# Patient Record
Sex: FEMALE | Race: WHITE | NOT HISPANIC OR LATINO | Employment: OTHER | URBAN - METROPOLITAN AREA
[De-identification: names, ages, dates, MRNs, and addresses within clinical notes are randomized per-mention and may not be internally consistent; named-entity substitution may affect disease eponyms.]

---

## 2021-06-24 ENCOUNTER — OFFICE VISIT (OUTPATIENT)
Dept: URGENT CARE | Age: 76
End: 2021-06-24
Payer: COMMERCIAL

## 2021-06-24 ENCOUNTER — APPOINTMENT (OUTPATIENT)
Dept: RADIOLOGY | Age: 76
End: 2021-06-24
Payer: COMMERCIAL

## 2021-06-24 VITALS
WEIGHT: 259 LBS | HEIGHT: 59 IN | DIASTOLIC BLOOD PRESSURE: 74 MMHG | RESPIRATION RATE: 20 BRPM | BODY MASS INDEX: 52.21 KG/M2 | HEART RATE: 80 BPM | TEMPERATURE: 98 F | SYSTOLIC BLOOD PRESSURE: 163 MMHG | OXYGEN SATURATION: 100 %

## 2021-06-24 DIAGNOSIS — M19.019 ARTHRITIS OF SHOULDER: ICD-10-CM

## 2021-06-24 DIAGNOSIS — M19.031 ARTHRITIS OF RIGHT WRIST: ICD-10-CM

## 2021-06-24 DIAGNOSIS — S80.02XA CONTUSION OF LEFT KNEE, INITIAL ENCOUNTER: Primary | ICD-10-CM
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
## 2021-06-24 DIAGNOSIS — M19.032 ARTHRITIS OF LEFT WRIST: ICD-10-CM

## 2021-06-24 PROCEDURE — 73110 X-RAY EXAM OF WRIST: CPT

## 2021-06-24 PROCEDURE — 73564 X-RAY EXAM KNEE 4 OR MORE: CPT

## 2021-06-24 PROCEDURE — 99213 OFFICE O/P EST LOW 20 MIN: CPT | Performed by: PHYSICIAN ASSISTANT

## 2021-06-24 PROCEDURE — 73030 X-RAY EXAM OF SHOULDER: CPT

## 2021-06-24 NOTE — PROGRESS NOTES
[facility]        NAME: Olive Pabon is a 77 y o  female  : 1945    MRN: [id number]  DATE: 2021  TIME: 12:00 PM    Assessment and Plan   Contusion of left knee, initial encounter [S80 02XA]  1  Contusion of left knee, initial encounter  XR knee 4+ vw left injury   2  Arthritis of right wrist  XR wrist 3+ vw right   3  Arthritis of left wrist  XR wrist 3+ vw left   4  Arthritis of shoulder  XR shoulder 2+ vw right     XR provider read  No acute findings identified  Physical exam largely unremarkable  Pt has significant arthritis and she states her pain feels like an exacerbation of these sx  Educated pt on signs and sx to follow up or go to ER  Pt states she understands and agrees to plan  Patient Instructions       Rest, Ice, and Elevate limb  Continue to monitor symptoms  If symptoms do not improve in one week, follow up with orthopedics  Call [facility]t [phone] to schedule and appointment  If new or worsening symptoms occur, go immediately to the ER  Chief Complaint     Chief Complaint   Patient presents with    Fall     pt fell yesterday on her bilateral knees, hit her shoulders and hit her head states she does remember falling  pt states she could not brace her fall  pt states she "hurts all over" , even my thumbs hurt from this fall  History of Present Illness       Fall  Incident onset: Yesterday pt was in house and tripped over box  Jerilee Punches forward on her knees, her b/l hands, and fell on to R shoulder  She was unable to get up so called EMS who stood her up  She sent them home after  She landed on hard floor  There was no blood loss  Pain location: Pain is worst to L knee, b/l hands, R shoulder  The pain is moderate  Pertinent negatives include no abdominal pain, fever, loss of consciousness, nausea, numbness or vomiting  She has tried nothing for the symptoms  Pt was ambulatory after the injury    She normally is ambulatory with walker at baseline  Pt has significant PMH diffuse arthritis  Pt states that she had b/l TKR  Pt was told she needs a R shoulder replacement and has constant pain in this shoulder  Pt also complains of chronic pain to b/l wrists and hands worse in the thumbs  Pt states that the pain feels similar in character to her usual but just worsened  Review of Systems   Review of Systems   Constitutional: Negative  Negative for chills, fatigue and fever  HENT: Negative  Eyes: Negative  Respiratory: Negative  Negative for chest tightness, shortness of breath and wheezing  Cardiovascular: Negative  Negative for chest pain and palpitations  Gastrointestinal: Negative for abdominal pain, constipation, diarrhea, nausea and vomiting  Endocrine: Negative  Genitourinary: Negative for dysuria, flank pain, frequency, pelvic pain, vaginal discharge and vaginal pain  Musculoskeletal: Negative for back pain, neck pain and neck stiffness  Skin: Negative  Negative for pallor and rash  Allergic/Immunologic: Negative  Neurological: Negative  Negative for dizziness, tremors, loss of consciousness, syncope, weakness, light-headedness and numbness  Hematological: Negative  Psychiatric/Behavioral: Negative  Current Medications     No current outpatient medications on file  Current Allergies     Allergies as of 06/24/2021    (Not on File)            The following portions of the patient's history were reviewed and updated as appropriate: allergies, current medications, past family history, past medical history, past social history, past surgical history and problem list      History reviewed  No pertinent past medical history  History reviewed  No pertinent surgical history  History reviewed  No pertinent family history  Medications have been verified          Objective   /74   Pulse 80   Temp 98 °F (36 7 °C)   Resp 20   Ht 4' 11" (1 499 m)   Wt 117 kg (259 lb)   SpO2 100%   BMI 52 31 kg/m²        Physical Exam     Physical Exam  Vitals and nursing note reviewed  Constitutional:       General: She is not in acute distress  Appearance: Normal appearance  She is well-developed  She is not ill-appearing or diaphoretic  HENT:      Head: Normocephalic and atraumatic  Cardiovascular:      Rate and Rhythm: Normal rate and regular rhythm  Heart sounds: Normal heart sounds  Pulmonary:      Effort: Pulmonary effort is normal  No respiratory distress  Breath sounds: Normal breath sounds  No wheezing, rhonchi or rales  Musculoskeletal:      Right shoulder: Tenderness (Diffuse to trapezius and lateral shoulder) present  No swelling, deformity, laceration or bony tenderness  Decreased range of motion (Pt unable to lift greater than 90 degrees  States that this is her baseline and her shoulder needs to be replaced)  Normal strength  Right wrist: No swelling, deformity, effusion, lacerations, tenderness or crepitus  Normal range of motion  Normal pulse  Left wrist: No swelling, deformity, effusion, lacerations, tenderness or crepitus  Normal range of motion  Normal pulse  Right hand: Tenderness (Diffuse TTP to palm of hand radial aspect  No specific snuff box tendernes  Pt states that this pain is chronic and not new ) present  No swelling, deformity, lacerations or bony tenderness  Normal range of motion  Normal strength  Normal sensation  Normal capillary refill  Normal pulse  Left hand: Tenderness (Diffuse TTP to palm of hand radial aspect  No specific snuff box tendernes  Pt states that this pain is chronic and not new) present  No swelling, deformity, lacerations or bony tenderness  Normal range of motion  Normal strength  Normal sensation  Normal capillary refill  Normal pulse  Cervical back: Normal range of motion and neck supple  Right knee: No swelling, deformity, effusion, erythema, ecchymosis, lacerations or bony tenderness  Normal range of motion  No tenderness  Normal alignment  Left knee: Ecchymosis (Silver dollar sized bruise to L knee below patella) present  No swelling, deformity, effusion, erythema, lacerations or crepitus  Normal range of motion (FROM  Unable to tolerate special tests)  Tenderness present  Normal alignment  Lymphadenopathy:      Cervical: No cervical adenopathy  Skin:     General: Skin is warm  Capillary Refill: Capillary refill takes less than 2 seconds  Findings: No rash  Neurological:      Mental Status: She is alert

## 2021-06-24 NOTE — PATIENT INSTRUCTIONS
Rest, Ice, and Elevate limb  Continue to monitor symptoms  If symptoms do not improve in one week, follow up with orthopedics  Call [facility] [phone] to schedule and appointment  If new or worsening symptoms occur, go immediately to the ER  Fall Prevention for Older Adults   WHAT YOU NEED TO KNOW:   As you age, your muscles weaken and your risk for falls increases  Your risk also increases if you take medicines that make you sleepy or dizzy  You may also be at risk if you have vision or joint problems, have low blood pressure, or are not active  DISCHARGE INSTRUCTIONS:   Call 911 or have someone else call if:   · You have fallen and are unconscious  · You have fallen and cannot move part of your body  Contact your healthcare provider if:   · You have fallen and have pain or a headache  · You have questions or concerns about your condition or care  Fall prevention tips:   · Stay active  Exercise can help strengthen your muscles and improve your balance  Your healthcare provider may recommend water aerobics, walking, or [place]  He or she may also recommend physical therapy to improve your coordination  Never start an exercise program without asking your healthcare provider first             · Wear shoes that fit well and have soles that   Wear shoes both inside and outside  Use slippers with good   Avoid shoes with high heels  · Use assistive devices as directed  Your healthcare provider may suggest that you use a cane or walker to help you keep your balance  You may need to have grab bars put in your bathroom near the toilet or in the shower  · Stand or sit up slowly  This may help you keep your balance and prevent falls  · Wear a personal alarm  This is a device that allows you to call 911 if you need help  Ask for more information on personal alarms  · Manage your medical conditions  Keep all appointments with your healthcare providers   Visit your eye doctor as directed  Home safety tips:       · Add items to prevent falls in the bathroom  Put nonslip strips on your bath or shower floor to prevent you from slipping  Use a bath mat if you do not have carpet in the bathroom  This will prevent you from falling when you step out of the bath or shower  Use a shower seat so you do not need to stand while you shower  Sit on the toilet or a chair in your bathroom to dry yourself and put on clothing  This will prevent you from losing your balance from drying or dressing yourself while you are standing  · Keep paths clear  Remove books, shoes, and other objects from walkways and stairs  Place cords for telephones and lamps out of the way so that you do not need to walk over them  Tape them down if you cannot move them  Remove small rugs  If you cannot remove a rug, secure it with double-sided tape  This will prevent you from tripping  · Install bright lights in your home  Use night lights to help light paths to the bathroom or kitchen  Always turn on the light before you start walking  · Keep items you use often on shelves within reach  Do not use a step stool to help you reach an item  · Paint or place reflective tape on the edges of your stairs  This will help you see the stairs better  Follow up with your healthcare provider as directed:  Write down your questions so you remember to ask them during your visits  © Copyright [address] Information is for End User's use only and may not be sold, redistributed or otherwise used for commercial purposes  All illustrations and images included in CareNotes® are the copyrighted property of A D A M , Inc  or [facility] Jennifer Martinez   The above information is an  only  It is not intended as medical advice for individual conditions or treatments  Talk to your doctor, nurse or pharmacist before following any medical regimen to see if it is safe and effective for you

## 2021-11-07 ENCOUNTER — OFFICE VISIT (OUTPATIENT)
Dept: URGENT CARE | Age: 76
End: 2021-11-07
Payer: COMMERCIAL

## 2021-11-07 VITALS
TEMPERATURE: 98 F | HEART RATE: 74 BPM | HEIGHT: 59 IN | OXYGEN SATURATION: 96 % | BODY MASS INDEX: 52.21 KG/M2 | WEIGHT: 259 LBS | SYSTOLIC BLOOD PRESSURE: 115 MMHG | RESPIRATION RATE: 18 BRPM | DIASTOLIC BLOOD PRESSURE: 67 MMHG

## 2021-11-07 DIAGNOSIS — N39.0 URINARY TRACT INFECTION WITH HEMATURIA, SITE UNSPECIFIED: Primary | ICD-10-CM

## 2021-11-07 DIAGNOSIS — R31.9 URINARY TRACT INFECTION WITH HEMATURIA, SITE UNSPECIFIED: Primary | ICD-10-CM

## 2021-11-07 LAB
SL AMB  POCT GLUCOSE, UA: NEGATIVE
SL AMB LEUKOCYTE ESTERASE,UA: ABNORMAL
SL AMB POCT BILIRUBIN,UA: ABNORMAL
SL AMB POCT BLOOD,UA: ABNORMAL
SL AMB POCT CLARITY,UA: ABNORMAL
SL AMB POCT COLOR,UA: ABNORMAL
SL AMB POCT KETONES,UA: 5
SL AMB POCT NITRITE,UA: POSITIVE
SL AMB POCT PH,UA: 5
SL AMB POCT SPECIFIC GRAVITY,UA: 1.03
SL AMB POCT URINE PROTEIN: 30
SL AMB POCT UROBILINOGEN: 0.2

## 2021-11-07 PROCEDURE — 81002 URINALYSIS NONAUTO W/O SCOPE: CPT | Performed by: PHYSICIAN ASSISTANT

## 2021-11-07 PROCEDURE — 87186 SC STD MICRODIL/AGAR DIL: CPT | Performed by: PHYSICIAN ASSISTANT

## 2021-11-07 PROCEDURE — S9083 URGENT CARE CENTER GLOBAL: HCPCS | Performed by: PHYSICIAN ASSISTANT

## 2021-11-07 PROCEDURE — 99213 OFFICE O/P EST LOW 20 MIN: CPT | Performed by: PHYSICIAN ASSISTANT

## 2021-11-07 PROCEDURE — 87086 URINE CULTURE/COLONY COUNT: CPT | Performed by: PHYSICIAN ASSISTANT

## 2021-11-07 RX ORDER — LORAZEPAM 1 MG/1
1 TABLET ORAL 3 TIMES DAILY PRN
COMMUNITY
Start: 2021-10-09

## 2021-11-07 RX ORDER — VENLAFAXINE HYDROCHLORIDE 150 MG/1
150 CAPSULE, EXTENDED RELEASE ORAL DAILY
COMMUNITY
Start: 2021-09-21

## 2021-11-07 RX ORDER — CIPROFLOXACIN 500 MG/1
500 TABLET, FILM COATED ORAL EVERY 12 HOURS SCHEDULED
Qty: 14 TABLET | Refills: 0 | Status: SHIPPED | OUTPATIENT
Start: 2021-11-07 | End: 2021-11-14

## 2021-11-07 RX ORDER — BUSPIRONE HYDROCHLORIDE 10 MG/1
10 TABLET ORAL 3 TIMES DAILY
COMMUNITY
Start: 2021-09-21

## 2021-11-07 RX ORDER — ATENOLOL 50 MG/1
50 TABLET ORAL DAILY
COMMUNITY
Start: 2021-08-18

## 2021-11-07 RX ORDER — LISINOPRIL 10 MG/1
TABLET ORAL
COMMUNITY

## 2021-11-07 RX ORDER — VENLAFAXINE 75 MG/1
75 TABLET ORAL
COMMUNITY

## 2021-11-10 ENCOUNTER — TELEPHONE (OUTPATIENT)
Dept: URGENT CARE | Facility: CLINIC | Age: 76
End: 2021-11-10

## 2021-11-10 LAB — BACTERIA UR CULT: ABNORMAL

## 2023-05-28 ENCOUNTER — HOSPITAL ENCOUNTER (EMERGENCY)
Facility: HOSPITAL | Age: 78
Discharge: HOME | End: 2023-05-28
Attending: EMERGENCY MEDICINE

## 2023-05-28 ENCOUNTER — APPOINTMENT (EMERGENCY)
Dept: CT IMAGING | Facility: HOSPITAL | Age: 78
End: 2023-05-28

## 2023-05-28 VITALS
BODY MASS INDEX: 50.49 KG/M2 | HEART RATE: 66 BPM | WEIGHT: 250 LBS | SYSTOLIC BLOOD PRESSURE: 149 MMHG | RESPIRATION RATE: 20 BRPM | TEMPERATURE: 98.5 F | OXYGEN SATURATION: 98 % | DIASTOLIC BLOOD PRESSURE: 70 MMHG

## 2023-05-28 DIAGNOSIS — R11.0 NAUSEA: ICD-10-CM

## 2023-05-28 DIAGNOSIS — K62.5 BRBPR (BRIGHT RED BLOOD PER RECTUM): Primary | ICD-10-CM

## 2023-05-28 DIAGNOSIS — R10.9 ABDOMINAL CRAMPING: ICD-10-CM

## 2023-05-28 DIAGNOSIS — R19.7 DIARRHEA: ICD-10-CM

## 2023-05-28 LAB
ALBUMIN SERPL BCP-MCNC: 3.9 G/DL (ref 3.5–5)
ALP SERPL-CCNC: 111 U/L (ref 34–104)
ALT SERPL W P-5'-P-CCNC: 10 U/L (ref 7–52)
ANION GAP SERPL CALCULATED.3IONS-SCNC: 5 MMOL/L (ref 4–13)
AST SERPL W P-5'-P-CCNC: 15 U/L (ref 13–39)
BASOPHILS # BLD AUTO: 0.03 THOUSANDS/ÂΜL (ref 0–0.1)
BASOPHILS NFR BLD AUTO: 0 % (ref 0–1)
BILIRUB SERPL-MCNC: 0.42 MG/DL (ref 0.2–1)
BUN SERPL-MCNC: 32 MG/DL (ref 5–25)
CALCIUM SERPL-MCNC: 9.2 MG/DL (ref 8.4–10.2)
CHLORIDE SERPL-SCNC: 108 MMOL/L (ref 96–108)
CO2 SERPL-SCNC: 21 MMOL/L (ref 21–32)
CREAT SERPL-MCNC: 1.36 MG/DL (ref 0.6–1.3)
EOSINOPHIL # BLD AUTO: 0.04 THOUSAND/ÂΜL (ref 0–0.61)
EOSINOPHIL NFR BLD AUTO: 0 % (ref 0–6)
ERYTHROCYTE [DISTWIDTH] IN BLOOD BY AUTOMATED COUNT: 13.2 % (ref 11.6–15.1)
GFR SERPL CREATININE-BSD FRML MDRD: 37 ML/MIN/1.73SQ M
GLUCOSE SERPL-MCNC: 101 MG/DL (ref 65–140)
HCT VFR BLD AUTO: 32.2 % (ref 34.8–46.1)
HGB BLD-MCNC: 10.1 G/DL (ref 11.5–15.4)
IMM GRANULOCYTES # BLD AUTO: 0.03 THOUSAND/UL (ref 0–0.2)
IMM GRANULOCYTES NFR BLD AUTO: 0 % (ref 0–2)
LIPASE SERPL-CCNC: 21 U/L (ref 11–82)
LYMPHOCYTES # BLD AUTO: 1.21 THOUSANDS/ÂΜL (ref 0.6–4.47)
LYMPHOCYTES NFR BLD AUTO: 14 % (ref 14–44)
MCH RBC QN AUTO: 31 PG (ref 26.8–34.3)
MCHC RBC AUTO-ENTMCNC: 31.4 G/DL (ref 31.4–37.4)
MCV RBC AUTO: 99 FL (ref 82–98)
MONOCYTES # BLD AUTO: 0.56 THOUSAND/ÂΜL (ref 0.17–1.22)
MONOCYTES NFR BLD AUTO: 6 % (ref 4–12)
NEUTROPHILS # BLD AUTO: 7.07 THOUSANDS/ÂΜL (ref 1.85–7.62)
NEUTS SEG NFR BLD AUTO: 80 % (ref 43–75)
NRBC BLD AUTO-RTO: 0 /100 WBCS
PLATELET # BLD AUTO: 206 THOUSANDS/UL (ref 149–390)
PMV BLD AUTO: 9.6 FL (ref 8.9–12.7)
POTASSIUM SERPL-SCNC: 5 MMOL/L (ref 3.5–5.3)
PROT SERPL-MCNC: 6.4 G/DL (ref 6.4–8.4)
RBC # BLD AUTO: 3.26 MILLION/UL (ref 3.81–5.12)
SODIUM SERPL-SCNC: 134 MMOL/L (ref 135–147)
WBC # BLD AUTO: 8.94 THOUSAND/UL (ref 4.31–10.16)

## 2023-05-28 RX ORDER — ONDANSETRON 4 MG/1
4 TABLET, FILM COATED ORAL EVERY 6 HOURS PRN
Qty: 12 TABLET | Refills: 0 | Status: SHIPPED | OUTPATIENT
Start: 2023-05-28

## 2023-05-28 NOTE — ED PROVIDER NOTES
History  Chief Complaint   Patient presents with   • Rectal Bleeding     Reports with upset stomach and hard bm last night rectal bleed and diarrhea today      81-year-old female history of hypertension and chronic low back pain presenting with rectal bleeding  Patient reports episode of nausea without vomiting yesterday as well as diffuse abdominal cramping and felt like she had to have diarrhea  Patient had a bowel movement and first passed a piece of large hard stool prior to diarrhea  Patient noted bright red blood in the toilet and on the toilet paper  Denies any rectal pain  Denies any melena  Reports intermittent nausea and diarrhea since that time yesterday  Denies any blood thinning medication  Patient concerned given that she had a previous history of colon cancer with a tumor removed from her colon reportedly about 20 years ago  Denies any chest pain shortness of breath  Here today due to persistence of bright red blood though improving  Denies any other complaints  Chart reviewed  No past medical history on file  Family History: non-contributory  Social History            Prior to Admission Medications   Prescriptions Last Dose Informant Patient Reported? Taking?    Cholecalciferol 125 MCG (5000 UT) TABS   Yes No   Sig: Take 5,000 Units by mouth   LORazepam (ATIVAN) 1 mg tablet   Yes No   Sig: Take 1 mg by mouth 3 (three) times a day as needed   atenolol (TENORMIN) 50 mg tablet   Yes No   Sig: Take 50 mg by mouth daily   busPIRone (BUSPAR) 10 mg tablet   Yes No   Sig: Take 10 mg by mouth 3 (three) times a day   lisinopril (ZESTRIL) 10 mg tablet   Yes No   Sig: Take by mouth   venlafaxine (EFFEXOR) 75 mg tablet   Yes No   Sig: Take 75 mg by mouth   venlafaxine (EFFEXOR-XR) 150 mg 24 hr capsule   Yes No   Sig: Take 150 mg by mouth daily      Facility-Administered Medications: None       Past Medical History:   Diagnosis Date   • Anxiety    • Arthritis    • Depression    • History of transfusion    • Hypertension        Past Surgical History:   Procedure Laterality Date   • APPENDECTOMY     •  SECTION     • CHOLECYSTECTOMY     • COLON SURGERY     • DILATION AND CURETTAGE OF UTERUS     • EYE SURGERY     • GASTRIC BYPASS     • HERNIA REPAIR     • HYSTERECTOMY     • JOINT REPLACEMENT         History reviewed  No pertinent family history  I have reviewed and agree with the history as documented  E-Cigarette/Vaping   • E-Cigarette Use Never User      E-Cigarette/Vaping Substances     Social History     Tobacco Use   • Smoking status: Never   • Smokeless tobacco: Never   Vaping Use   • Vaping Use: Never used   Substance Use Topics   • Alcohol use: Never   • Drug use: Never       Review of Systems   Constitutional: Negative for appetite change, chills, diaphoresis, fever and unexpected weight change  HENT: Negative for congestion and rhinorrhea  Eyes: Negative for photophobia and visual disturbance  Respiratory: Negative for cough, chest tightness and shortness of breath  Cardiovascular: Negative for chest pain, palpitations and leg swelling  Gastrointestinal: Positive for abdominal pain, blood in stool and diarrhea  Negative for abdominal distention, constipation, nausea and vomiting  Genitourinary: Negative for dysuria and hematuria  Musculoskeletal: Negative for back pain, joint swelling, neck pain and neck stiffness  Skin: Negative for color change, pallor, rash and wound  Neurological: Negative for dizziness, syncope, weakness, light-headedness and headaches  Psychiatric/Behavioral: Negative for agitation  All other systems reviewed and are negative  Physical Exam  Physical Exam  Vitals and nursing note reviewed  Constitutional:       General: She is not in acute distress  Appearance: Normal appearance  She is well-developed  She is not ill-appearing, toxic-appearing or diaphoretic  HENT:      Head: Normocephalic and atraumatic        Nose: Nose normal  No congestion or rhinorrhea  Mouth/Throat:      Mouth: Mucous membranes are moist       Pharynx: Oropharynx is clear  No oropharyngeal exudate or posterior oropharyngeal erythema  Eyes:      General: No scleral icterus  Right eye: No discharge  Left eye: No discharge  Extraocular Movements: Extraocular movements intact  Conjunctiva/sclera: Conjunctivae normal       Pupils: Pupils are equal, round, and reactive to light  Neck:      Vascular: No JVD  Trachea: No tracheal deviation  Comments: Supple  Normal range of motion  Cardiovascular:      Rate and Rhythm: Normal rate and regular rhythm  Heart sounds: Normal heart sounds  No murmur heard  No friction rub  No gallop  Comments: Normal rate and regular rhythm  Pulmonary:      Effort: Pulmonary effort is normal  No respiratory distress  Breath sounds: Normal breath sounds  No stridor  No wheezing or rales  Comments: Clear to auscultation bilaterally  Chest:      Chest wall: No tenderness  Abdominal:      General: Bowel sounds are normal  There is no distension  Palpations: Abdomen is soft  Tenderness: There is no abdominal tenderness  There is no right CVA tenderness, left CVA tenderness, guarding or rebound  Comments: Soft, nontender, nondistended  Normal bowel sounds throughout   Musculoskeletal:         General: No swelling, tenderness, deformity or signs of injury  Normal range of motion  Cervical back: Normal range of motion and neck supple  No rigidity  No muscular tenderness  Right lower leg: No edema  Left lower leg: No edema  Lymphadenopathy:      Cervical: No cervical adenopathy  Skin:     General: Skin is warm and dry  Coloration: Skin is not pale  Findings: No erythema or rash  Neurological:      General: No focal deficit present  Mental Status: She is alert  Mental status is at baseline  Sensory: No sensory deficit  Motor: No weakness or abnormal muscle tone  Coordination: Coordination normal       Gait: Gait normal       Comments: Alert  Strength and sensation grossly intact  Ambulatory without difficulty at baseline  Psychiatric:         Behavior: Behavior normal          Thought Content:  Thought content normal          Vital Signs  ED Triage Vitals [05/28/23 1158]   Temperature Pulse Respirations Blood Pressure SpO2   98 5 °F (36 9 °C) 68 20 149/68 97 %      Temp Source Heart Rate Source Patient Position - Orthostatic VS BP Location FiO2 (%)   Oral Monitor Sitting Right arm --      Pain Score       No Pain           Vitals:    05/28/23 1158   BP: 149/68   Pulse: 68   Patient Position - Orthostatic VS: Sitting         Visual Acuity      ED Medications  Medications - No data to display    Diagnostic Studies  Results Reviewed     Procedure Component Value Units Date/Time    Comprehensive metabolic panel [[id number]]  (Abnormal) Collected: 05/28/23 1248    Lab Status: Final result Specimen: Blood from Arm, Right Updated: 05/28/23 1314     Sodium 134 mmol/L      Potassium 5 0 mmol/L      Chloride 108 mmol/L      CO2 21 mmol/L      ANION GAP 5 mmol/L      BUN 32 mg/dL      Creatinine 1 36 mg/dL      Glucose 101 mg/dL      Calcium 9 2 mg/dL      AST 15 U/L      ALT 10 U/L      Alkaline Phosphatase 111 U/L      Total Protein 6 4 g/dL      Albumin 3 9 g/dL      Total Bilirubin 0 42 mg/dL      eGFR 37 ml/min/1 73sq m     Narrative:      [place] guidelines for Chronic Kidney Disease (CKD):   •  Stage 1 with normal or high GFR (GFR > 90 mL/min/1 73 square meters)  •  Stage 2 Mild CKD (GFR = 60-89 mL/min/1 73 square meters)  •  Stage 3A Moderate CKD (GFR = 45-59 mL/min/1 73 square meters)  •  Stage 3B Moderate CKD (GFR = 30-44 mL/min/1 73 square meters)  •  Stage 4 Severe CKD (GFR = 15-29 mL/min/1 73 square meters)  •  Stage 5 End Stage CKD (GFR <15 mL/min/1 73 square meters)  Note: GFR calculation is accurate only with a steady state creatinine    Lipase [[id number]]  (Normal) Collected: 05/28/23 1248    Lab Status: Final result Specimen: Blood from Arm, Right Updated: 05/28/23 1314     Lipase 21 u/L     CBC and differential [[id number]]  (Abnormal) Collected: 05/28/23 1248    Lab Status: Final result Specimen: Blood from Arm, Right Updated: 05/28/23 1258     WBC 8 94 Thousand/uL      RBC 3 26 Million/uL      Hemoglobin 10 1 g/dL      Hematocrit 32 2 %      MCV 99 fL      MCH 31 0 pg      MCHC 31 4 g/dL      RDW 13 2 %      MPV 9 6 fL      Platelets 352 Thousands/uL      nRBC 0 /100 WBCs      Neutrophils Relative 80 %      Immat GRANS % 0 %      Lymphocytes Relative 14 %      Monocytes Relative 6 %      Eosinophils Relative 0 %      Basophils Relative 0 %      Neutrophils Absolute 7 07 Thousands/µL      Immature Grans Absolute 0 03 Thousand/uL      Lymphocytes Absolute 1 21 Thousands/µL      Monocytes Absolute 0 56 Thousand/µL      Eosinophils Absolute 0 04 Thousand/µL      Basophils Absolute 0 03 Thousands/µL                  CT abdomen pelvis wo contrast   Final Result by Ovidio Claudio MD (05/28 1314)      Nonobstructive bowel pattern suggestive of nonspecific segmental sigmoid colitis  Decreased intracardiac blood pool density attributed to anemia  Additional chronic findings and negatives as above  Workstation performed: [id number]                    Procedures  Procedures         ED Course                                             Medical Decision Making  42-year-old female history of hypertension and chronic low back pain presenting with rectal bleeding  Benign abdominal exam and painless rectal bleeding  Given recent passage of large hard stool suspect likely trauma related  Patient concerned about previous history of cancer  Last colonoscopy reportedly 6 years ago  Patient with reported contrast allergy    Discussed with patient and will defer contrast at this time  Plan for Noncon CT abdomen pelvis  Basic labs including abdominal labs  Reassess  Labs interpreted by me notable for hemoglobin 10 1 which per chart review is around baseline  Labs otherwise no significant acute process  Imaging no acute process  Patient remains at baseline  Suspect likely hemorrhoid and irritation related  Prescription sent to pharmacy  Discussed results and recommendations  Advised follow up PCP and GI  Medication recommendations  Given instructions and return precautions  Patient/family at bedside acknowledged understanding of all written and verbal instructions and return precautions  Discharged  Amount and/or Complexity of Data Reviewed  Labs: ordered  Radiology: ordered  Risk  Prescription drug management  Disposition  Final diagnoses:   BRBPR (bright red blood per rectum)   Abdominal cramping   Nausea   Diarrhea     Time reflects when diagnosis was documented in both MDM as applicable and the Disposition within this note     Time User Action Codes Description Comment    5/28/2023 12:31 PM Sheria Goody Add [K62 5] BRBPR (bright red blood per rectum)     5/28/2023 12:31 PM Sheria Goody Add [R10 9] Abdominal cramping     5/28/2023 12:31 PM Sheria Goody Add [R11 0] Nausea     5/28/2023 12:31 PM Sheria Goody Add [R19 7] Diarrhea       ED Disposition     ED Disposition   Discharge    Condition   Stable    Date/Time   Sun May 28, 2023  1:16 PM    Comment   Patricia Mariscal discharge to home/self care                 Follow-up Information     Follow up With Specialties Details Why Contact Info Additional [facility], DO Internal Medicine Schedule an appointment as soon as possible for a visit in 1 week  [address] [place] [address] 66 [phone]  [address] [facility] [place] [facility] Schedule an appointment as soon as possible for a visit in 1 week  [facility] [address] [facility]  [address] [facility] [place], [address] [facility], [address] [facility], [place], [place], [address] [place] [address]           Patient's Medications   Discharge Prescriptions    ONDANSETRON (ZOFRAN) 4 MG TABLET    Take 1 tablet (4 mg total) by mouth every 6 (six) hours as needed for nausea or vomiting       Start Date: 5/28/2023 End Date: --       Order Dose: 4 mg       Quantity: 12 tablet    Refills: 0       No discharge procedures on file      PDMP Review     None          ED Provider  Electronically Signed by           García Quinn MD  05/28/23 0998

## 2023-06-09 ENCOUNTER — CONSULT (OUTPATIENT)
Dept: PAIN MEDICINE | Facility: CLINIC | Age: 78
End: 2023-06-09
Payer: MEDICARE

## 2023-06-09 VITALS
BODY MASS INDEX: 47.78 KG/M2 | WEIGHT: 237 LBS | SYSTOLIC BLOOD PRESSURE: 136 MMHG | DIASTOLIC BLOOD PRESSURE: 78 MMHG | HEIGHT: 59 IN | HEART RATE: 64 BPM

## 2023-06-09 DIAGNOSIS — M48.061 SPINAL STENOSIS OF LUMBAR REGION, UNSPECIFIED WHETHER NEUROGENIC CLAUDICATION PRESENT: Primary | ICD-10-CM

## 2023-06-09 DIAGNOSIS — M19.011 PRIMARY OSTEOARTHRITIS OF RIGHT SHOULDER: ICD-10-CM

## 2023-06-09 DIAGNOSIS — M54.16 LUMBAR RADICULOPATHY: ICD-10-CM

## 2023-06-09 DIAGNOSIS — M47.816 LUMBAR SPONDYLOSIS: ICD-10-CM

## 2023-06-09 PROCEDURE — 99204 OFFICE O/P NEW MOD 45 MIN: CPT | Performed by: ANESTHESIOLOGY

## 2023-06-09 RX ORDER — FLUTICASONE PROPIONATE 50 MCG
SPRAY, SUSPENSION (ML) NASAL
COMMUNITY

## 2023-06-09 RX ORDER — DORZOLAMIDE HCL 20 MG/ML
1 SOLUTION/ DROPS OPHTHALMIC
COMMUNITY

## 2023-06-09 RX ORDER — SULFAMETHOXAZOLE AND TRIMETHOPRIM 800; 160 MG/1; MG/1
TABLET ORAL
COMMUNITY

## 2023-06-09 RX ORDER — ACETAMINOPHEN 500 MG
500 TABLET ORAL
COMMUNITY

## 2023-06-09 RX ORDER — TRAMADOL HYDROCHLORIDE 50 MG/1
50 TABLET ORAL EVERY 6 HOURS PRN
COMMUNITY

## 2023-06-09 RX ORDER — HYDROCHLOROTHIAZIDE 12.5 MG/1
12.5 TABLET ORAL DAILY
COMMUNITY
Start: 2023-03-16

## 2023-06-09 RX ORDER — LATANOPROST 50 UG/ML
1 SOLUTION/ DROPS OPHTHALMIC
COMMUNITY

## 2023-06-09 RX ORDER — METHOCARBAMOL 500 MG/1
500 TABLET, FILM COATED ORAL 2 TIMES DAILY PRN
COMMUNITY
Start: 2023-03-16

## 2023-06-09 RX ORDER — DIPHENHYDRAMINE HCL 25 MG
TABLET ORAL
COMMUNITY

## 2023-06-09 RX ORDER — MULTIVIT-MIN/FOLIC/VIT K/LYCOP 400-300MCG
TABLET ORAL
COMMUNITY

## 2023-06-09 NOTE — PROGRESS NOTES
Assessment  1  Spinal stenosis of lumbar region, unspecified whether neurogenic claudication present    2  Lumbar spondylosis    3  Lumbar radiculopathy    4  Primary osteoarthritis of right shoulder        Plan  72-year-old female with a history of fibromyalgia and gastric bypass, referred by Dr Quiana Latham, presenting for initial consultation regarding localized right shoulder pain and lumbosacral back pain that radiates into the anterolateral aspect of the right lower extremity with associated numbness  She has had the symptoms for many years and denies any trauma or inciting event  MRI of the lumbar spine from March 2023 demonstrates multilevel spondylosis with severe central stenosis at L2-3 and L3-4  Severe bilateral foraminal stenosis at L3-4  Moderate to severe bilateral foraminal stenosis at L4-5  Moderate to severe foraminal stenosis at L5-S1  X-ray of the right shoulder demonstrates severe OA of the glenohumeral joint  Demonstrates again OA of the glenohumeral and AC joint  Suggestive tear of glenoid labrum  Patient did receive bilateral L4 TFESI by pain specialist at [address] [place] [address] [address] March 21, 2023 which essentially resolved her left lower extremity symptoms, however right lower extremity symptoms persist   Physical therapy has not been helpful in the past for her shoulder or lumbar complaints  The methocarbamol and Tylenol provide minimal relief  She has numerous allergies  Right shoulder pain secondary to OA  Right lower extremity symptoms radicular in nature stemming from multilevel stenosis  1  I will schedule the patient for right glenohumeral joint injection followed by right L3 and L4 TFESI 2 weeks later  2  Patient will continue with Tylenol and methocarbamol as needed  3  Patient will continue with her home exercise program  4    I will follow-up with the patient in 6 weeks      Complete risks and benefits including bleeding, infection, tissue reaction, nerve injury and allergic reaction were discussed  The approach was demonstrated using models and literature was provided  Verbal and written consent was obtained  My impressions and treatment recommendations were discussed in detail with the patient who verbalized understanding and had no further questions  Discharge instructions were provided  I personally saw and examined the patient and I agree with the above discussed plan of care  No orders of the defined types were placed in this encounter  No orders of the defined types were placed in this encounter  History of Present Illness    Alvin Silverman is a 69 y o  female with a history of fibromyalgia and gastric bypass, referred by Dr Kim Cruz, presenting for initial consultation regarding localized right shoulder pain and lumbosacral back pain that radiates into the anterolateral aspect of the right lower extremity with associated numbness  She has had the symptoms for many years and denies any trauma or inciting event  Denies any bladder or bowel incontinence or saddle anesthesia  MRI of the lumbar spine from March 2023 demonstrates multilevel spondylosis with severe central stenosis at L2-3 and L3-4  Severe bilateral foraminal stenosis at L3-4  Moderate to severe bilateral foraminal stenosis at L4-5  Moderate to severe foraminal stenosis at L5-S1  X-ray of the right shoulder demonstrates severe OA of the glenohumeral joint  Demonstrates again OA of the glenohumeral and AC joint  Suggestive tear of glenoid labrum  Patient did receive bilateral L4 TFESI by pain specialist at [facility] March 21, 2023 which essentially resolved her left lower extremity symptoms, however right lower extremity symptoms persist   Physical therapy has not been helpful in the past for her shoulder or lumbar complaints  The methocarbamol and Tylenol provide minimal relief  She has numerous allergies  The patient rates her pain an 8 out of 10 and the pain is constant    The pain does not follow any particular pattern throughout the day  The pain is described as shooting, numbness, throbbing, dull, aching  The pain is decreased with lying down, sitting, and relaxation  The pain is increased with lying down, standing, bending, walking, exercise, coughing, sneezing, and bowel movements  Other than as stated above, the patient denies any interval changes in medications, medical condition, mental condition, symptoms, or allergies since the last office visit  I have personally reviewed and/or updated the patient's past medical history, past surgical history, family history, social history, current medications, allergies, and vital signs today  Review of Systems   Constitutional: Negative for fever and unexpected weight change  HENT: Negative for trouble swallowing  Eyes: Negative for visual disturbance  Respiratory: Negative for shortness of breath and wheezing  Cardiovascular: Negative for chest pain and palpitations  Gastrointestinal: Positive for nausea  Negative for constipation, diarrhea and vomiting  Endocrine: Negative for cold intolerance, heat intolerance and polydipsia  Genitourinary: Negative for difficulty urinating and frequency  Musculoskeletal: Positive for arthralgias, joint swelling and myalgias  Negative for gait problem  Skin: Positive for rash  Neurological: Positive for numbness and headaches  Negative for dizziness, seizures, syncope and weakness  Hematological: Does not bruise/bleed easily  Psychiatric/Behavioral: Negative for dysphoric mood  Anxiety, depression   All other systems reviewed and are negative  There is no problem list on file for this patient        Past Medical History:   Diagnosis Date   • Anxiety    • Arthritis    • Depression    • History of transfusion    • Hypertension        Past Surgical History:   Procedure Laterality Date   • APPENDECTOMY     •  SECTION     • CHOLECYSTECTOMY     • COLON SURGERY     • DILATION AND CURETTAGE OF UTERUS     • EYE SURGERY     • GASTRIC BYPASS     • HERNIA REPAIR     • HYSTERECTOMY     • JOINT REPLACEMENT         No family history on file  Social History     Occupational History   • Not on file   Tobacco Use   • Smoking status: Never   • Smokeless tobacco: Never   Vaping Use   • Vaping Use: Never used   Substance and Sexual Activity   • Alcohol use: Never   • Drug use: Never   • Sexual activity: Not Currently       Current Outpatient Medications on File Prior to Visit   Medication Sig   • atenolol (TENORMIN) 50 mg tablet Take 50 mg by mouth daily   • busPIRone (BUSPAR) 10 mg tablet Take 10 mg by mouth 3 (three) times a day   • Cholecalciferol 125 MCG (5000 UT) TABS Take 5,000 Units by mouth   • lisinopril (ZESTRIL) 10 mg tablet Take by mouth   • LORazepam (ATIVAN) 1 mg tablet Take 1 mg by mouth 3 (three) times a day as needed   • ondansetron (ZOFRAN) 4 mg tablet Take 1 tablet (4 mg total) by mouth every 6 (six) hours as needed for nausea or vomiting   • venlafaxine (EFFEXOR) 75 mg tablet Take 75 mg by mouth   • venlafaxine (EFFEXOR-XR) 150 mg 24 hr capsule Take 150 mg by mouth daily     No current facility-administered medications on file prior to visit         Allergies   Allergen Reactions   • Alprazolam Other (See Comments) and Hives     ineffective  ineffective     • Azithromycin Hives   • Cephalexin Hives   • Codeine Hyperactivity, Other (See Comments) and Hives     Hyperactivity and agitation  Hyperactivity and agitation     • Diclofenac GI Intolerance   • Diclofenac Sodium Itching   • Duloxetine Other (See Comments) and Hives     Dizziness and confusion  Dizziness and confusion     • Duloxetine Hcl Dizziness and Other (See Comments)   • Erythromycin Hives   • Fentanyl Itching   • Hydrochlorothiazide Other (See Comments)     Other reaction(s): hypotensive   • Hydromorphone Hives   • Ibuprofen Other (See Comments) and GI Intolerance     Stomach pains  Stomach pains     • Iodinated Contrast Media Hives   • Iodine - Food Allergy Itching     CT dye   • Latex Itching   • Medical Tape Itching and Other (See Comments)      Paper tape - skin tears off     • Meloxicam GI Intolerance   • Methadone Other (See Comments) and Hives     agitation  agitation     • Methylprednisolone Other (See Comments)     Raises BP  Raises blood pressure     • Milnacipran Itching   • Mirtazapine Other (See Comments) and Hives     hallucinations  hallucinations     • Molds & Smuts Sneezing   • Morphine Other (See Comments)     Agitation  Agitation if on for more than a few days  Agitation if on for more than a few days     • Naloxone Other (See Comments)   • Naproxen Other (See Comments) and GI Bleeding     Stomach pains  Stomach pain     • Nefazodone Hives   • Nitrofurantoin Hives   • Oxycodone-Acetaminophen Itching and Hives     Agitation  Itchy and agitation     • Pentazocine Other (See Comments)     Other reaction(s): did not help with pain   • Pollen Extract Sneezing   • Prednisone Other (See Comments)     hypertension  Tolerates steroidal inhalers  Tolerates steroidal inhalers     • Pregabalin Other (See Comments)     Pt stated she is unsure why she can't take, just knows she cant take the med  agitation     • Prochlorperazine Other (See Comments)     Extreme agitation  Extreme agitation     • Rofecoxib Other (See Comments)     Other reaction(s): ineffective   • Sertraline Other (See Comments)     agitation  agitation     • Tapentadol GI Intolerance   • Amiloride Rash     Other reaction(s): hypotensive   • Celecoxib Rash     Pt stated she is unsure why she can't take, just knows she cant take the med  • Clonazepam Rash   • Penicillins Hives and Rash       Physical Exam    There were no vitals taken for this visit  Constitutional: normal, well developed, well nourished, alert, in no distress and non-toxic and no overt pain behavior    Eyes: anicteric  HEENT: grossly intact  Neck: supple, symmetric, trachea midline and no masses   Pulmonary:even and unlabored  Cardiovascular:No edema or pitting edema present  Skin:Normal without rashes or lesions and well hydrated  Psychiatric:Mood and affect appropriate  Neurologic:Cranial Nerves II-XII grossly intact  Musculoskeletal:antalgic gait  Anterior aspect of the right shoulder tender to palpation  Limited range of motion of right shoulder with flexion and abduction to about 70 degrees  Upper extremity strength 5 out of 5 in all muscle groups  Sensation intact to light touch in C5-T1 dermatomes on the right  Bilateral lumbar paraspinals tender to palpation worse on the right than left from L3-S1  Bilateral patellar and Achilles reflexes were 1 out of 4 and symmetrical   No clonus is noted bilaterally  Bilateral lower extremity strength 5 out of 5 in all muscular's  Sensation intact to light touch in L3-S1 dermatomes bilaterally  Positive seated straight leg raise on the right  Imaging           MRI lumbar spine wo contrast  Order: [id number]  Impression    Impression:     1   When compared to prior MRI of the lumbar spine on 11/29/2022, no adverse   interval change  2   Advanced multilevel degenerative changes with severe multifocal spinal canal   stenosis seen at the L2/L3 and L3/L4  3   Severe neuroforaminal narrowing bilaterally at L3/L4, moderate/severe   neuroforaminal narrowing on the right at L1/L2, bilateral at L2/L3, on the left   at L4/L5 and L5/S1  4   Increased signal seen within the disc spaces at T10/T11, T12/L1, L1/L2 and   L3/L4, likely degenerative in nature  5   Advanced spondylosis with facet arthropathy seen at multiple levels in the   lumbar spine as described above  6   Moderate/severe levoscoliosis of the lumbar spine  7   No acute fracture  Workstation:[employer] [facility] [employer]  Narrative    History: Fecal incontinence status post spinal injection       Procedure: MRI of the lumbar spine was obtained with the following sequences:   Sagittal T1, sagittal T2, sagittal STIR and axial T2 weighted images  Comparison: Prior MRI of the lumbar spine 11/29/2022  Findings: For the purposes of this dictation, the lumbar vertebrae are labeled   from a caudal to cranial direction, the first vertebra with lumbar morphology is   labeled as L5  Conus and lower thoracic cord: The conus is normal in position and signal   intensity and terminates at the L2 level  Marrow: There is no abnormal suspicious focal marrow replacement on the STIR   images  Elevated STIR signal seen along the endplates of X37/U15 likely   represent type I Modic changes  Hemangioma seen within the L2 vertebral body  Alignment: On coronal planar imaging there is moderate/severe levoscoliosis with   bony apex at the L1/L2 level  There is stepwise retrolisthesis of T12 on L1 L1   on L2 L2 on L3  Grade 1 anterolisthesis of L4 on L5  These findings are   unchanged and likely degenerative  Disc: There is increased signal seen within the disc spaces at T10/T11, T12/L1,   L1/L2 and L3/L4, these findings are nonspecific likely degenerative  T9/T10: Only visualized on sagittal imaging demonstrates posterior disc bulge,   facet arthropathy contributing to mild spinal canal mild right and no left   neuroforaminal narrowing  T10/T11: Bilobed disc bulge, facet arthropathy prominent epidural fat   contributing to mild spinal canal stenosis and moderate left and mild/moderate   right neuroforaminal narrowing  T11/T12: Bilobed disc bulge, facet arthropathy, prominent epidural fat   contributing to moderate spinal canal stenosis and minimal bilateral   neuroforaminal narrowing       T12/L1: Retrolisthesis with uncovering of the circumferential disc, facet   arthropathy ligamentum flavum infolding and prominent epidural fat contributing   to moderate spinal canal stenosis moderate right no significant left   neuroforaminal narrowing     L1-L2: Retrolisthesis with uncovering of the broad-based disc, facet arthropathy   ligamentum flavum infolding and prominent epidural fat contributing to moderate   spinal canal stenosis with redundancy of the cord seen at this level and   moderate/severe right and mild left neuroforaminal narrowing     L2-L3 : Retrolisthesis with uncovering of the broad-based disc, facet   arthropathy, ligamentum flavum infolding and prominent epidural fat contributing   to severe spinal canal stenosis and moderate/severe bilateral neuroforaminal   narrowing     L3-L4: Circumferential disc bulge with superimposed central disc protrusion,   facet arthropathy with ligamentum flavum infolding and prominent epidural fat   contributing to severe bilateral neuroforaminal narrowing  L4-L5: Anterolisthesis with uncovering of the broad-based disc, facet   arthropathy with ligamentum flavum infolding contributing to mild/moderate   spinal canal stenosis and moderate/severe left and moderate right neuroforaminal   narrowing  L5-S1: Circumferential disc bulge, which narrows both lateral recesses, facet   arthropathy with ligamentum flavum infolding contributing to mild spinal canal   mild right and moderate/severe left neuroforaminal narrowing  Exam End: 03/24/23  4:49 PM    Specimen Collected: 03/24/23  4:55 PM Last Resulted: 03/24/23  5:14 PM   Received From: [address] [place] [address]  Result Received: 05/28/23 11:56 AM         XR HIP 2 TO 3 VIEWS WITH PELVIS RIGHT  Order: [id number]  Impression    IMPRESSION: No acute fracture or dislocation  Workstation:[id number]  Narrative    History: Right hip pain  No known trauma  AP view of the pelvis with AP and lateral views of the right hip are performed  There is no evidence of acute fracture or dislocation  There is mild diffuse   enthesopathy    Exam End: 04/04/22 12:50 PM    Specimen Collected: 04/04/22  1:14 PM Last Resulted: 04/04/22  1:15 PM   Received From: [facility]  Result Received: 05/28/23 11:56 AM                XR LUMBAR SPINE COMPLETE 4+ VW  Order: [id number]  Impression    Impression: Severe degenerative changes of the lumbar spine  [id number]  Narrative    This result has an attachment that is not available  History: Years of back pain with a fall one week ago  5 projections of the lumbar spine were obtained  There is minimal scoliotic   curvature with apex to the left centered at L2  This distorts appearance of the   lateral view of the spine  There is no evidence of fracture  There is severe   interspace narrowing at L1/L2 and L2/L3 with anterior osteophytes  There is   moderate interspace narrowing at L5-S1  There is grade 1 anterolisthesis of L4   on L5  There is minimal posterior displacement of L2 on L3  There is severe   degenerative change of the apophyseal joints at L2/L3 and L3/L4 bilaterally  There is no spondylolysis  There is no change from 3/20/17  Exam End: 05/30/18  3:15 PM    Specimen Collected: 06/01/18 10:19 AM Last Resulted: 06/01/18 10:22 AM   Received From: [address] [place] [address]  Result Received: 05/28/23 11:56 AM          XR LUMBAR SPINE COMPLETE 4+ VW  Order: [id number]  Impression    Impression: Severe degenerative changes of the lumbar spine  [id number]  Narrative    This result has an attachment that is not available  History: Years of back pain with a fall one week ago  5 projections of the lumbar spine were obtained  There is minimal scoliotic   curvature with apex to the left centered at L2  This distorts appearance of the   lateral view of the spine  There is no evidence of fracture  There is severe   interspace narrowing at L1/L2 and L2/L3 with anterior osteophytes  There is   moderate interspace narrowing at L5-S1  There is grade 1 anterolisthesis of L4   on L5  There is minimal posterior displacement of L2 on L3   There is severe   degenerative change of the apophyseal joints at L2/L3 and L3/L4 bilaterally  There is no spondylolysis  There is no change from 3/20/17  Exam End: 05/30/18  3:15 PM    Specimen Collected: 06/01/18 10:19 AM Last Resulted: 06/01/18 10:22 AM   Received From: [facility]  Result Received: 05/28/23 11:56 AM          DXA BONE DENSITY SCAN STANDARD 2 SITES  Order: [id number]  Impression    Impression: No evidence of osteopenia or osteoporosis  Workstation:[id number]  Narrative    Clinical history postmenopausal  Hologic DEXA bone densitometry of the lumbar   spine was performed  Average bone density from L1 to L4 measured 1 383 g/ cm2  This corresponds to 132 percent of peak bone mass  Bone densitometry of the left hip was performed  Total bone density of the left   hip measured 1 223 g/ cm2  This corresponds to 130 percent of peak bone  mass  In the left femoral neck,    bone density measures 1 073  g/cm2, corresponding   to 126    percent of peak bone mass   Bone densitometry of the left forearm was performed  Total bone density of the   distal third of the radius and ulna measured 0 769 g per square centimeter  This corresponds 111% of peak bone mass    Exam End: 03/20/17  3:52 PM    Specimen Collected: 03/23/17  3:06 PM Last Resulted: 03/23/17  3:07 PM   Received From: [facility]  Result Received: 05/28/23 11:56 AM     View Encounter

## 2023-07-03 ENCOUNTER — HOSPITAL ENCOUNTER (OUTPATIENT)
Dept: RADIOLOGY | Facility: CLINIC | Age: 78
Discharge: HOME | End: 2023-07-03
Payer: MEDICARE

## 2023-07-03 VITALS
HEART RATE: 62 BPM | OXYGEN SATURATION: 100 % | SYSTOLIC BLOOD PRESSURE: 136 MMHG | TEMPERATURE: 98.2 F | RESPIRATION RATE: 18 BRPM | DIASTOLIC BLOOD PRESSURE: 80 MMHG

## 2023-07-03 DIAGNOSIS — M19.011 PRIMARY OSTEOARTHRITIS OF RIGHT SHOULDER: ICD-10-CM

## 2023-07-03 PROCEDURE — 77002 NEEDLE LOCALIZATION BY XRAY: CPT

## 2023-07-03 PROCEDURE — 20610 DRAIN/INJ JOINT/BURSA W/O US: CPT | Performed by: ANESTHESIOLOGY

## 2023-07-03 PROCEDURE — 77002 NEEDLE LOCALIZATION BY XRAY: CPT | Performed by: ANESTHESIOLOGY

## 2023-07-03 PROCEDURE — A9585 GADOBUTROL INJECTION: HCPCS | Performed by: ANESTHESIOLOGY

## 2023-07-03 RX ORDER — METHYLPREDNISOLONE ACETATE 40 MG/ML
40 INJECTION, SUSPENSION INTRA-ARTICULAR; INTRALESIONAL; INTRAMUSCULAR; PARENTERAL; SOFT TISSUE ONCE
Status: COMPLETED | OUTPATIENT
Start: 2023-07-03 | End: 2023-07-03

## 2023-07-03 RX ORDER — LIDOCAINE HYDROCHLORIDE 10 MG/ML
5 INJECTION, SOLUTION EPIDURAL; INFILTRATION; INTRACAUDAL; PERINEURAL ONCE
Status: COMPLETED | OUTPATIENT
Start: 2023-07-03 | End: 2023-07-03

## 2023-07-03 RX ORDER — BUPIVACAINE HCL/PF 2.5 MG/ML
30 VIAL (ML) INJECTION ONCE
Status: COMPLETED | OUTPATIENT
Start: 2023-07-03 | End: 2023-07-03

## 2023-07-03 RX ADMIN — LIDOCAINE HYDROCHLORIDE 3 ML: 10 INJECTION, SOLUTION EPIDURAL; INFILTRATION; INTRACAUDAL; PERINEURAL at 14:10

## 2023-07-03 RX ADMIN — BUPIVACAINE HYDROCHLORIDE 2 ML: 2.5 INJECTION, SOLUTION EPIDURAL; INFILTRATION; INTRACAUDAL at 14:10

## 2023-07-03 RX ADMIN — METHYLPREDNISOLONE ACETATE 40 MG: 40 INJECTION, SUSPENSION INTRA-ARTICULAR; INTRALESIONAL; INTRAMUSCULAR; SOFT TISSUE at 14:10

## 2023-07-03 RX ADMIN — GADOBUTROL 1 ML: 604.72 INJECTION INTRAVENOUS at 14:10

## 2023-07-03 NOTE — H&P
History of Present Illness: The patient is a 69 y.o. female who presents with complaints of right shoulder pain.     Past Medical History:   Diagnosis Date   • Anxiety    • Arthritis    • Depression    • History of transfusion    • Hypertension        Past Surgical History:   Procedure Laterality Date   • APPENDECTOMY     •  SECTION     • CHOLECYSTECTOMY     • COLON SURGERY     • DILATION AND CURETTAGE OF UTERUS     • EYE SURGERY     • GASTRIC BYPASS     • HERNIA REPAIR     • HYSTERECTOMY     • JOINT REPLACEMENT           Current Outpatient Medications:   •  acetaminophen (TYLENOL) 500 mg tablet, Take 500 mg by mouth, Disp: , Rfl:   •  atenolol (TENORMIN) 50 mg tablet, Take 50 mg by mouth daily, Disp: , Rfl:   •  busPIRone (BUSPAR) 10 mg tablet, Take 10 mg by mouth 3 (three) times a day (Patient not taking: Reported on 2023), Disp: , Rfl:   •  Cholecalciferol 125 MCG (5000 UT) capsule, Take 10,000 Units by mouth, Disp: , Rfl:   •  Cholecalciferol 125 MCG (5000 UT) TABS, Take 5,000 Units by mouth, Disp: , Rfl:   •  Cranberry, Vacc oxycoccus, (Cranberry Extract) 200 MG CAPS, Take by mouth, Disp: , Rfl:   •  diphenhydrAMINE (Benadryl Allergy) 25 mg tablet, Take by mouth, Disp: , Rfl:   •  dorzolamide (TRUSOPT) 2 % ophthalmic solution, 1 drop, Disp: , Rfl:   •  fluticasone (Flonase Allergy Relief) 50 mcg/act nasal spray, into each nostril, Disp: , Rfl:   •  hydrochlorothiazide (HYDRODIURIL) 12.5 mg tablet, Take 12.5 mg by mouth daily, Disp: , Rfl:   •  Hypromellose 0.3 % SOLN, 1 drop 4 (four) times a day as needed, Disp: , Rfl:   •  latanoprost (XALATAN) 0.005 % ophthalmic solution, Apply 1 drop to eye, Disp: , Rfl:   •  lisinopril (ZESTRIL) 10 mg tablet, Take by mouth, Disp: , Rfl:   •  LORazepam (ATIVAN) 1 mg tablet, Take 1 mg by mouth 3 (three) times a day as needed, Disp: , Rfl:   •  methocarbamol (ROBAXIN) 500 mg tablet, Take 500 mg by mouth 2 (two) times a day as needed, Disp: , Rfl:   •  ondansetron (ZOFRAN) 4 mg tablet, Take 1 tablet (4 mg total) by mouth every 6 (six) hours as needed for nausea or vomiting, Disp: 12 tablet, Rfl: 0  •  sulfamethoxazole-trimethoprim (BACTRIM DS) 800-160 mg per tablet, , Disp: , Rfl:   •  traMADol (ULTRAM) 50 mg tablet, Take 50 mg by mouth every 6 (six) hours as needed, Disp: , Rfl:   •  venlafaxine (EFFEXOR) 75 mg tablet, Take 75 mg by mouth, Disp: , Rfl:   •  venlafaxine (EFFEXOR-XR) 150 mg 24 hr capsule, Take 150 mg by mouth daily, Disp: , Rfl:     Allergies   Allergen Reactions   • Alprazolam Other (See Comments) and Hives     ineffective  ineffective     • Azithromycin Hives   • Cephalexin Hives   • Codeine Hyperactivity, Other (See Comments) and Hives     Hyperactivity and agitation  Hyperactivity and agitation     • Diclofenac GI Intolerance   • Diclofenac Sodium Itching   • Duloxetine Other (See Comments) and Hives     Dizziness and confusion  Dizziness and confusion     • Duloxetine Hcl Dizziness and Other (See Comments)   • Erythromycin Hives   • Fentanyl Itching   • Hydrochlorothiazide Other (See Comments)     Other reaction(s): hypotensive   • Hydromorphone Hives   • Ibuprofen Other (See Comments) and GI Intolerance     Stomach pains  Stomach pains     • Iodinated Contrast Media Hives   • Iodine - Food Allergy Itching     CT dye   • Latex Itching   • Medical Tape Itching and Other (See Comments)      Paper tape - skin tears off     • Meloxicam GI Intolerance   • Methadone Other (See Comments) and Hives     agitation  agitation     • Methylprednisolone Other (See Comments)     Raises BP  Raises blood pressure     • Milnacipran Itching   • Mirtazapine Other (See Comments) and Hives     hallucinations  hallucinations     • Molds & Smuts Sneezing   • Morphine Other (See Comments)     Agitation  Agitation if on for more than a few days  Agitation if on for more than a few days     • Naloxone Other (See Comments)   • Naproxen Other (See Comments) and GI Bleeding Stomach pains  Stomach pain     • Nefazodone Hives   • Nitrofurantoin Hives   • Oxycodone-Acetaminophen Itching and Hives     Agitation  Itchy and agitation     • Pentazocine Other (See Comments)     Other reaction(s): did not help with pain   • Pollen Extract Sneezing   • Prednisone Other (See Comments)     hypertension  Tolerates steroidal inhalers  Tolerates steroidal inhalers     • Pregabalin Other (See Comments)     Pt stated she is unsure why she can't take, just knows she cant take the med. agitation     • Prochlorperazine Other (See Comments)     Extreme agitation  Extreme agitation     • Rofecoxib Other (See Comments)     Other reaction(s): ineffective   • Sertraline Other (See Comments)     agitation  agitation     • Tapentadol GI Intolerance   • Amiloride Rash     Other reaction(s): hypotensive   • Celecoxib Rash     Pt stated she is unsure why she can't take, just knows she cant take the med. • Clonazepam Rash   • Penicillins Hives and Rash       Physical Exam:   Vitals:    07/03/23 1419   BP: 136/80   Pulse: 62   Resp: 18   Temp:    SpO2: 100%     General: Awake, Alert, Oriented x 3, Mood and affect appropriate  Respiratory: Respirations even and unlabored  Cardiovascular: Peripheral pulses intact; no edema  Musculoskeletal Exam: Painful range of motion of right shoulder    ASA Score: 2         Assessment:   1.  Primary osteoarthritis of right shoulder        Plan: Rght glenohumeral joint injection

## 2023-07-03 NOTE — DISCHARGE INSTRUCTIONS

## 2023-07-10 ENCOUNTER — TELEPHONE (OUTPATIENT)
Dept: PAIN MEDICINE | Facility: CLINIC | Age: 78
End: 2023-07-10

## 2023-07-17 ENCOUNTER — HOSPITAL ENCOUNTER (OUTPATIENT)
Dept: RADIOLOGY | Facility: CLINIC | Age: 78
Discharge: HOME | End: 2023-07-17
Payer: MEDICARE

## 2023-07-17 VITALS
SYSTOLIC BLOOD PRESSURE: 137 MMHG | DIASTOLIC BLOOD PRESSURE: 61 MMHG | TEMPERATURE: 97.9 F | OXYGEN SATURATION: 98 % | RESPIRATION RATE: 20 BRPM | HEART RATE: 73 BPM

## 2023-07-17 DIAGNOSIS — M54.16 LUMBAR RADICULOPATHY: ICD-10-CM

## 2023-07-17 PROCEDURE — 64484 NJX AA&/STRD TFRM EPI L/S EA: CPT | Performed by: ANESTHESIOLOGY

## 2023-07-17 PROCEDURE — A9585 GADOBUTROL INJECTION: HCPCS | Performed by: ANESTHESIOLOGY

## 2023-07-17 PROCEDURE — 64483 NJX AA&/STRD TFRM EPI L/S 1: CPT | Performed by: ANESTHESIOLOGY

## 2023-07-17 RX ORDER — PAPAVERINE HCL 150 MG
15 CAPSULE, EXTENDED RELEASE ORAL ONCE
Status: COMPLETED | OUTPATIENT
Start: 2023-07-17 | End: 2023-07-17

## 2023-07-17 RX ADMIN — LIDOCAINE HYDROCHLORIDE 2 ML: 20 INJECTION, SOLUTION EPIDURAL; INFILTRATION; INTRACAUDAL; PERINEURAL at 14:19

## 2023-07-17 RX ADMIN — DEXAMETHASONE SODIUM PHOSPHATE 15 MG: 10 INJECTION, SOLUTION INTRAMUSCULAR; INTRAVENOUS at 14:08

## 2023-07-17 RX ADMIN — GADOBUTROL 2 ML: 604.72 INJECTION INTRAVENOUS at 14:19

## 2023-07-17 NOTE — DISCHARGE INSTRUCTIONS
Epidural Steroid Injection   WHAT YOU NEED TO KNOW:   An epidural steroid injection (ALONSO) is a procedure to inject steroid medicine into the epidural space. The epidural space is between your spinal cord and vertebrae. Steroids reduce inflammation and fluid buildup in your spine that may be causing pain. You may be given pain medicine along with the steroids. ACTIVITY  Do not drive or operate machinery today. No strenuous activity today - bending, lifting, etc.  You may resume normal activites starting tomorrow - start slowly and as tolerated. You may shower today, but no tub baths or hot tubs. You may have numbness for several hours from the local anesthetic. Please use caution and common sense, especially with weight-bearing activities. CARE OF THE INJECTION SITE  If you have soreness or pain, apply ice to the area today (20 minutes on/20 minutes off). Starting tomorrow, you may use warm, moist heat or ice if needed. You may have an increase or change in your discomfort for 36-48 hours after your treatment. Apply ice and continue with any pain medication you have been prescribed. Notify [facility] if you have any of the following: redness, drainage, swelling, headache, stiff neck or fever above 100°F.    SPECIAL INSTRUCTIONS  Our office will contact you in approximately 7 days for a progress report. MEDICATIONS  Continue to take all routine medications. Our office may have instructed you to hold some medications. As no general anesthesia was used in today's procedure, you should not experience any side effects related to anesthesia. If you are diabetic, the steroids used in today's injection may temporarily increase your blood sugar levels after the first few days after your injection. Please keep a close eye on your sugars and alert the doctor who manages your diabetes if your sugars are significantly high from your baseline or you are symptomatic.      If you have a problem specifically related to your procedure, please call our office at [phone]. Problems not related to your procedure should be directed to your primary care physician.

## 2023-07-17 NOTE — H&P
History of Present Illness: The patient is a 69 y.o. female who presents with complaints of back and leg pain.     Past Medical History:   Diagnosis Date   • Anxiety    • Arthritis    • Depression    • History of transfusion    • Hypertension        Past Surgical History:   Procedure Laterality Date   • APPENDECTOMY     •  SECTION     • CHOLECYSTECTOMY     • COLON SURGERY     • DILATION AND CURETTAGE OF UTERUS     • EYE SURGERY     • GASTRIC BYPASS     • HERNIA REPAIR     • HYSTERECTOMY     • JOINT REPLACEMENT           Current Outpatient Medications:   •  acetaminophen (TYLENOL) 500 mg tablet, Take 500 mg by mouth, Disp: , Rfl:   •  atenolol (TENORMIN) 50 mg tablet, Take 50 mg by mouth daily, Disp: , Rfl:   •  busPIRone (BUSPAR) 10 mg tablet, Take 10 mg by mouth 3 (three) times a day (Patient not taking: Reported on 2023), Disp: , Rfl:   •  Cholecalciferol 125 MCG (5000 UT) capsule, Take 10,000 Units by mouth, Disp: , Rfl:   •  Cholecalciferol 125 MCG (5000 UT) TABS, Take 5,000 Units by mouth, Disp: , Rfl:   •  Cranberry, Vacc oxycoccus, (Cranberry Extract) 200 MG CAPS, Take by mouth, Disp: , Rfl:   •  diphenhydrAMINE (Benadryl Allergy) 25 mg tablet, Take by mouth, Disp: , Rfl:   •  dorzolamide (TRUSOPT) 2 % ophthalmic solution, 1 drop, Disp: , Rfl:   •  fluticasone (Flonase Allergy Relief) 50 mcg/act nasal spray, into each nostril, Disp: , Rfl:   •  hydrochlorothiazide (HYDRODIURIL) 12.5 mg tablet, Take 12.5 mg by mouth daily, Disp: , Rfl:   •  Hypromellose 0.3 % SOLN, 1 drop 4 (four) times a day as needed, Disp: , Rfl:   •  latanoprost (XALATAN) 0.005 % ophthalmic solution, Apply 1 drop to eye, Disp: , Rfl:   •  lisinopril (ZESTRIL) 10 mg tablet, Take by mouth, Disp: , Rfl:   •  LORazepam (ATIVAN) 1 mg tablet, Take 1 mg by mouth 3 (three) times a day as needed, Disp: , Rfl:   •  methocarbamol (ROBAXIN) 500 mg tablet, Take 500 mg by mouth 2 (two) times a day as needed, Disp: , Rfl:   •  ondansetron (ZOFRAN) 4 mg tablet, Take 1 tablet (4 mg total) by mouth every 6 (six) hours as needed for nausea or vomiting, Disp: 12 tablet, Rfl: 0  •  sulfamethoxazole-trimethoprim (BACTRIM DS) 800-160 mg per tablet, , Disp: , Rfl:   •  traMADol (ULTRAM) 50 mg tablet, Take 50 mg by mouth every 6 (six) hours as needed, Disp: , Rfl:   •  venlafaxine (EFFEXOR) 75 mg tablet, Take 75 mg by mouth, Disp: , Rfl:   •  venlafaxine (EFFEXOR-XR) 150 mg 24 hr capsule, Take 150 mg by mouth daily, Disp: , Rfl:     Allergies   Allergen Reactions   • Alprazolam Other (See Comments) and Hives     ineffective  ineffective     • Azithromycin Hives   • Cephalexin Hives   • Codeine Hyperactivity, Other (See Comments) and Hives     Hyperactivity and agitation  Hyperactivity and agitation     • Diclofenac GI Intolerance   • Diclofenac Sodium Itching   • Duloxetine Other (See Comments) and Hives     Dizziness and confusion  Dizziness and confusion     • Duloxetine Hcl Dizziness and Other (See Comments)   • Erythromycin Hives   • Fentanyl Itching   • Hydrochlorothiazide Other (See Comments)     Other reaction(s): hypotensive   • Hydromorphone Hives   • Ibuprofen Other (See Comments) and GI Intolerance     Stomach pains  Stomach pains     • Iodinated Contrast Media Hives   • Iodine - Food Allergy Itching     CT dye   • Latex Itching   • Medical Tape Itching and Other (See Comments)      Paper tape - skin tears off     • Meloxicam GI Intolerance   • Methadone Other (See Comments) and Hives     agitation  agitation     • Methylprednisolone Other (See Comments)     Raises BP  Raises blood pressure     • Milnacipran Itching   • Mirtazapine Other (See Comments) and Hives     hallucinations  hallucinations     • Molds & Smuts Sneezing   • Morphine Other (See Comments)     Agitation  Agitation if on for more than a few days  Agitation if on for more than a few days     • Naloxone Other (See Comments)   • Naproxen Other (See Comments) and GI Bleeding Stomach pains  Stomach pain     • Nefazodone Hives   • Nitrofurantoin Hives   • Oxycodone-Acetaminophen Itching and Hives     Agitation  Itchy and agitation     • Pentazocine Other (See Comments)     Other reaction(s): did not help with pain   • Pollen Extract Sneezing   • Prednisone Other (See Comments)     hypertension  Tolerates steroidal inhalers  Tolerates steroidal inhalers     • Pregabalin Other (See Comments)     Pt stated she is unsure why she can't take, just knows she cant take the med. agitation     • Prochlorperazine Other (See Comments)     Extreme agitation  Extreme agitation     • Rofecoxib Other (See Comments)     Other reaction(s): ineffective   • Sertraline Other (See Comments)     agitation  agitation     • Tapentadol GI Intolerance   • Amiloride Rash     Other reaction(s): hypotensive   • Celecoxib Rash     Pt stated she is unsure why she can't take, just knows she cant take the med. • Clonazepam Rash   • Penicillins Hives and Rash       Physical Exam:   Vitals:    07/17/23 1329   BP: 132/78   Pulse: 93   Resp: 20   Temp: 97.9 °F (36.6 °C)   SpO2: 96%     General: Awake, Alert, Oriented x 3, Mood and affect appropriate  Respiratory: Respirations even and unlabored  Cardiovascular: Peripheral pulses intact; no edema  Musculoskeletal Exam: Left lumbar paraspinals tender to palpation    ASA Score: 2         Assessment:   1.  Lumbar radiculopathy        Plan: Right L3 and L4 TFESI

## 2023-07-18 NOTE — TELEPHONE ENCOUNTER
Pt is a little bit better. Pt spoke with the Dr about this yesterday. When she was in  The office getting another procedure done.

## 2023-07-24 ENCOUNTER — TELEPHONE (OUTPATIENT)
Dept: PAIN MEDICINE | Facility: CLINIC | Age: 78
End: 2023-07-24

## 2023-08-07 ENCOUNTER — LAB REQUISITION (OUTPATIENT)
Dept: LAB | Facility: HOSPITAL | Age: 78
End: 2023-08-07
Payer: MEDICARE

## 2023-08-07 DIAGNOSIS — Z00.00 ENCOUNTER FOR GENERAL ADULT MEDICAL EXAMINATION WITHOUT ABNORMAL FINDINGS: ICD-10-CM

## 2023-08-07 LAB
BASOPHILS # BLD AUTO: 0.04 THOUSANDS/ÂΜL (ref 0–0.1)
BASOPHILS NFR BLD AUTO: 1 % (ref 0–1)
EOSINOPHIL # BLD AUTO: 0.08 THOUSAND/ÂΜL (ref 0–0.61)
EOSINOPHIL NFR BLD AUTO: 1 % (ref 0–6)
ERYTHROCYTE [DISTWIDTH] IN BLOOD BY AUTOMATED COUNT: 13.5 % (ref 11.6–15.1)
HCT VFR BLD AUTO: 33.6 % (ref 34.8–46.1)
HGB BLD-MCNC: 10.6 G/DL (ref 11.5–15.4)
IMM GRANULOCYTES # BLD AUTO: 0.01 THOUSAND/UL (ref 0–0.2)
IMM GRANULOCYTES NFR BLD AUTO: 0 % (ref 0–2)
LYMPHOCYTES # BLD AUTO: 1.47 THOUSANDS/ÂΜL (ref 0.6–4.47)
LYMPHOCYTES NFR BLD AUTO: 24 % (ref 14–44)
MCH RBC QN AUTO: 31.6 PG (ref 26.8–34.3)
MCHC RBC AUTO-ENTMCNC: 31.5 G/DL (ref 31.4–37.4)
MCV RBC AUTO: 100 FL (ref 82–98)
MONOCYTES # BLD AUTO: 0.59 THOUSAND/ÂΜL (ref 0.17–1.22)
MONOCYTES NFR BLD AUTO: 9 % (ref 4–12)
NEUTROPHILS # BLD AUTO: 4.06 THOUSANDS/ÂΜL (ref 1.85–7.62)
NEUTS SEG NFR BLD AUTO: 65 % (ref 43–75)
NRBC BLD AUTO-RTO: 0 /100 WBCS
PLATELET # BLD AUTO: 245 THOUSANDS/UL (ref 149–390)
PMV BLD AUTO: 10.7 FL (ref 8.9–12.7)
RBC # BLD AUTO: 3.35 MILLION/UL (ref 3.81–5.12)
WBC # BLD AUTO: 6.25 THOUSAND/UL (ref 4.31–10.16)

## 2023-08-07 PROCEDURE — 80053 COMPREHEN METABOLIC PANEL: CPT | Performed by: FAMILY MEDICINE

## 2023-08-07 PROCEDURE — 85025 COMPLETE CBC W/AUTO DIFF WBC: CPT | Performed by: FAMILY MEDICINE

## 2023-08-07 PROCEDURE — 83690 ASSAY OF LIPASE: CPT | Performed by: FAMILY MEDICINE

## 2023-08-07 PROCEDURE — 82150 ASSAY OF AMYLASE: CPT | Performed by: FAMILY MEDICINE

## 2023-08-08 LAB
ALBUMIN SERPL BCP-MCNC: 3.9 G/DL (ref 3.5–5)
ALP SERPL-CCNC: 111 U/L (ref 46–116)
ALT SERPL W P-5'-P-CCNC: 24 U/L (ref 12–78)
AMYLASE SERPL-CCNC: 44 IU/L (ref 25–115)
ANION GAP SERPL CALCULATED.3IONS-SCNC: 5 MMOL/L
AST SERPL W P-5'-P-CCNC: 16 U/L (ref 5–45)
BILIRUB SERPL-MCNC: 0.2 MG/DL (ref 0.2–1)
BUN SERPL-MCNC: 42 MG/DL (ref 5–25)
CALCIUM SERPL-MCNC: 9.5 MG/DL (ref 8.3–10.1)
CHLORIDE SERPL-SCNC: 110 MMOL/L (ref 96–108)
CO2 SERPL-SCNC: 20 MMOL/L (ref 21–32)
CREAT SERPL-MCNC: 1.54 MG/DL (ref 0.6–1.3)
GFR SERPL CREATININE-BSD FRML MDRD: 32 ML/MIN/1.73SQ M
GLUCOSE SERPL-MCNC: 87 MG/DL (ref 65–140)
LIPASE SERPL-CCNC: 232 U/L (ref 73–393)
POTASSIUM SERPL-SCNC: 5.4 MMOL/L (ref 3.5–5.3)
PROT SERPL-MCNC: 7 G/DL (ref 6.4–8.4)
SODIUM SERPL-SCNC: 135 MMOL/L (ref 135–147)

## 2023-08-10 ENCOUNTER — LAB REQUISITION (OUTPATIENT)
Dept: LAB | Facility: HOSPITAL | Age: 78
End: 2023-08-10
Payer: MEDICARE

## 2023-08-10 DIAGNOSIS — Z00.00 ENCOUNTER FOR GENERAL ADULT MEDICAL EXAMINATION WITHOUT ABNORMAL FINDINGS: ICD-10-CM

## 2023-08-10 LAB
T4 FREE SERPL-MCNC: 0.66 NG/DL (ref 0.61–1.12)
TSH SERPL DL<=0.05 MIU/L-ACNC: 5.04 UIU/ML (ref 0.45–4.5)

## 2023-08-10 PROCEDURE — 84439 ASSAY OF FREE THYROXINE: CPT | Performed by: FAMILY MEDICINE

## 2023-08-10 PROCEDURE — 84443 ASSAY THYROID STIM HORMONE: CPT | Performed by: FAMILY MEDICINE

## 2023-09-22 ENCOUNTER — TELEPHONE (OUTPATIENT)
Age: 78
End: 2023-09-22

## 2023-09-22 NOTE — TELEPHONE ENCOUNTER
S/w pt who is having glenohumeral and lower back pain, s/p procedures 7/3 and 7/17. Pt requested tramadol(presently  prescribed by other provider) [facility] scheduled to review. Pt appreciative of call.

## 2023-09-22 NOTE — TELEPHONE ENCOUNTER
Caller: Patricia         Doctor: Kristie Suarez         Reason for call: Pt would like to know if prescription for tramadol can change where she takes it twice a day or if something better can be given for pain         Call back#: [phone]

## 2023-09-22 NOTE — TELEPHONE ENCOUNTER
Caller: Patricia     Doctor: Basil Ivan     Reason for call: patient returning nurses phone call     Best contact [phone]

## 2023-09-27 ENCOUNTER — APPOINTMENT (OUTPATIENT)
Dept: LAB | Facility: HOSPITAL | Age: 78
End: 2023-09-27
Payer: MEDICARE

## 2023-09-27 DIAGNOSIS — I10 ESSENTIAL HYPERTENSION, MALIGNANT: ICD-10-CM

## 2023-09-27 LAB
25(OH)D3 SERPL-MCNC: 28.9 NG/ML (ref 30–100)
ALBUMIN SERPL BCP-MCNC: 4.2 G/DL (ref 3.5–5)
ALP SERPL-CCNC: 92 U/L (ref 34–104)
ALT SERPL W P-5'-P-CCNC: 17 U/L (ref 7–52)
ANION GAP SERPL CALCULATED.3IONS-SCNC: 10 MMOL/L
AST SERPL W P-5'-P-CCNC: 22 U/L (ref 13–39)
BILIRUB SERPL-MCNC: 0.36 MG/DL (ref 0.2–1)
BUN SERPL-MCNC: 29 MG/DL (ref 5–25)
CALCIUM SERPL-MCNC: 9.6 MG/DL (ref 8.4–10.2)
CHLORIDE SERPL-SCNC: 107 MMOL/L (ref 96–108)
CHOLEST SERPL-MCNC: 183 MG/DL
CO2 SERPL-SCNC: 21 MMOL/L (ref 21–32)
CREAT SERPL-MCNC: 1.38 MG/DL (ref 0.6–1.3)
ERYTHROCYTE [DISTWIDTH] IN BLOOD BY AUTOMATED COUNT: 13.3 % (ref 11.6–15.1)
EST. AVERAGE GLUCOSE BLD GHB EST-MCNC: 100 MG/DL
GFR SERPL CREATININE-BSD FRML MDRD: 36 ML/MIN/1.73SQ M
GLUCOSE SERPL-MCNC: 97 MG/DL (ref 65–140)
HBA1C MFR BLD: 5.1 %
HCT VFR BLD AUTO: 33.5 % (ref 34.8–46.1)
HDLC SERPL-MCNC: 52 MG/DL
HGB BLD-MCNC: 10.5 G/DL (ref 11.5–15.4)
LDLC SERPL CALC-MCNC: 106 MG/DL (ref 0–100)
MCH RBC QN AUTO: 32.5 PG (ref 26.8–34.3)
MCHC RBC AUTO-ENTMCNC: 31.3 G/DL (ref 31.4–37.4)
MCV RBC AUTO: 104 FL (ref 82–98)
NONHDLC SERPL-MCNC: 131 MG/DL
PLATELET # BLD AUTO: 260 THOUSANDS/UL (ref 149–390)
PMV BLD AUTO: 10 FL (ref 8.9–12.7)
POTASSIUM SERPL-SCNC: 5 MMOL/L (ref 3.5–5.3)
PROT SERPL-MCNC: 6.9 G/DL (ref 6.4–8.4)
RBC # BLD AUTO: 3.23 MILLION/UL (ref 3.81–5.12)
SODIUM SERPL-SCNC: 138 MMOL/L (ref 135–147)
TRIGL SERPL-MCNC: 125 MG/DL
TSH 30M P TRH SERPL-ACNC: 3.37 UIU/ML
TSH SERPL DL<=0.05 MIU/L-ACNC: 3.37 UIU/ML (ref 0.45–4.5)
WBC # BLD AUTO: 5.32 THOUSAND/UL (ref 4.31–10.16)

## 2023-09-27 PROCEDURE — 84443 ASSAY THYROID STIM HORMONE: CPT

## 2023-09-27 PROCEDURE — 82306 VITAMIN D 25 HYDROXY: CPT

## 2023-09-27 PROCEDURE — 80061 LIPID PANEL: CPT

## 2023-09-27 PROCEDURE — 85027 COMPLETE CBC AUTOMATED: CPT

## 2023-09-27 PROCEDURE — 80053 COMPREHEN METABOLIC PANEL: CPT

## 2023-09-27 PROCEDURE — 83036 HEMOGLOBIN GLYCOSYLATED A1C: CPT

## 2023-09-27 PROCEDURE — 36415 COLL VENOUS BLD VENIPUNCTURE: CPT

## 2023-09-27 NOTE — PROGRESS NOTES
Assessment:  1. Spinal stenosis of lumbar region, unspecified whether neurogenic claudication present    2. Primary osteoarthritis of right shoulder    3. Lumbar spondylosis    4. Lumbar radiculopathy        Plan:  1. Patient will be scheduled for repeat right intra-articular shoulder injection followed by right L3 and L4 TFESI 2 weeks later    Complete risks and benefits including bleeding, infection, tissue reaction, nerve injury and allergic reaction were discussed. The patient was agreeable and verbalized an understanding    2. Patient may continue tramadol and methocarbamol as prescribed  3. Continue with home exercise program  4. Follow-up after procedures or sooner if needed    History of Present Illness: The patient is a 69 y.o. female with a history of fibromyalgia and gastric bypass last seen on 06/09/2023 who presents for a follow up office visit in regards to chronic right shoulder pain and low back pain that radiates into the anterolateral aspect of the right lower extremity she denies bowel or bladder incontinence or saddle anesthesia. Patient is status post right intra-articular shoulder injection on July 3, 2023 and right L3 and L4 TFESI on July 17, 2023, both of which provided 75% relief for approximately 4 weeks. She does use tramadol 50 mg as needed and methocarbamol as needed with some relief    The patient rates her pain a 6-8 out of 10 on the numeric pain rating scale. She intermittently has pain in the morning and in the evening which is described as dull aching, sharp, cramping, shooting, numbness and pins-and-needles    I have personally reviewed and/or updated the patient's past medical history, past surgical history, family history, social history, current medications, allergies, and vital signs today.        Review of Systems:    Review of Systems      Past Medical History:   Diagnosis Date   • Anxiety    • Arthritis    • Depression    • History of transfusion    • Hypertension Past Surgical History:   Procedure Laterality Date   • APPENDECTOMY     •  SECTION     • CHOLECYSTECTOMY     • COLON SURGERY     • DILATION AND CURETTAGE OF UTERUS     • EYE SURGERY     • GASTRIC BYPASS     • HERNIA REPAIR     • HYSTERECTOMY     • JOINT REPLACEMENT         History reviewed. No pertinent family history.     Social History     Occupational History   • Not on file   Tobacco Use   • Smoking status: Never   • Smokeless tobacco: Never   Vaping Use   • Vaping Use: Never used   Substance and Sexual Activity   • Alcohol use: Never   • Drug use: Never   • Sexual activity: Not Currently         Current Outpatient Medications:   •  acetaminophen (TYLENOL) 500 mg tablet, Take 500 mg by mouth, Disp: , Rfl:   •  atenolol (TENORMIN) 50 mg tablet, Take 50 mg by mouth daily, Disp: , Rfl:   •  Cholecalciferol 125 MCG (5000 UT) capsule, Take 10,000 Units by mouth, Disp: , Rfl:   •  Cranberry, Vacc oxycoccus, (Cranberry Extract) 200 MG CAPS, Take by mouth, Disp: , Rfl:   •  hydrochlorothiazide (HYDRODIURIL) 12.5 mg tablet, Take 12.5 mg by mouth daily, Disp: , Rfl:   •  latanoprost (XALATAN) 0.005 % ophthalmic solution, Apply 1 drop to eye, Disp: , Rfl:   •  lisinopril (ZESTRIL) 10 mg tablet, Take by mouth, Disp: , Rfl:   •  LORazepam (ATIVAN) 1 mg tablet, Take 1 mg by mouth 3 (three) times a day as needed, Disp: , Rfl:   •  methocarbamol (ROBAXIN) 500 mg tablet, Take 500 mg by mouth 2 (two) times a day as needed, Disp: , Rfl:   •  ondansetron (ZOFRAN) 4 mg tablet, Take 1 tablet (4 mg total) by mouth every 6 (six) hours as needed for nausea or vomiting, Disp: 12 tablet, Rfl: 0  •  sulfamethoxazole-trimethoprim (BACTRIM DS) 800-160 mg per tablet, , Disp: , Rfl:   •  traMADol (ULTRAM) 50 mg tablet, Take 50 mg by mouth every 6 (six) hours as needed, Disp: , Rfl:   •  venlafaxine (EFFEXOR) 75 mg tablet, Take 75 mg by mouth, Disp: , Rfl:   •  venlafaxine (EFFEXOR-XR) 150 mg 24 hr capsule, Take 150 mg by mouth daily, Disp: , Rfl:   •  busPIRone (BUSPAR) 10 mg tablet, Take 10 mg by mouth 3 (three) times a day (Patient not taking: Reported on 6/9/2023), Disp: , Rfl:   •  Cholecalciferol 125 MCG (5000 UT) TABS, Take 5,000 Units by mouth, Disp: , Rfl:   •  diphenhydrAMINE (Benadryl Allergy) 25 mg tablet, Take by mouth, Disp: , Rfl:   •  dorzolamide (TRUSOPT) 2 % ophthalmic solution, 1 drop, Disp: , Rfl:   •  fluticasone (Flonase Allergy Relief) 50 mcg/act nasal spray, into each nostril, Disp: , Rfl:   •  Hypromellose 0.3 % SOLN, 1 drop 4 (four) times a day as needed, Disp: , Rfl:     Allergies   Allergen Reactions   • Alprazolam Other (See Comments) and Hives     ineffective  ineffective     • Azithromycin Hives   • Cephalexin Hives   • Codeine Hyperactivity, Other (See Comments) and Hives     Hyperactivity and agitation  Hyperactivity and agitation     • Diclofenac GI Intolerance   • Diclofenac Sodium Itching   • Duloxetine Other (See Comments) and Hives     Dizziness and confusion  Dizziness and confusion     • Duloxetine Hcl Dizziness and Other (See Comments)   • Erythromycin Hives   • Fentanyl Itching   • Hydrochlorothiazide Other (See Comments)     Other reaction(s): hypotensive   • Hydromorphone Hives   • Ibuprofen Other (See Comments) and GI Intolerance     Stomach pains  Stomach pains     • Iodinated Contrast Media Hives   • Iodine - Food Allergy Itching     CT dye   • Latex Itching   • Medical Tape Itching and Other (See Comments)      Paper tape - skin tears off     • Meloxicam GI Intolerance   • Methadone Other (See Comments) and Hives     agitation  agitation     • Methylprednisolone Other (See Comments)     Raises BP  Raises blood pressure     • Milnacipran Itching   • Mirtazapine Other (See Comments) and Hives     hallucinations  hallucinations     • Molds & Smuts Sneezing   • Morphine Other (See Comments)     Agitation  Agitation if on for more than a few days  Agitation if on for more than a few days     • Naloxone Other (See Comments)   • Naproxen Other (See Comments) and GI Bleeding     Stomach pains  Stomach pain     • Nefazodone Hives   • Nitrofurantoin Hives   • Oxycodone-Acetaminophen Itching and Hives     Agitation  Itchy and agitation     • Pentazocine Other (See Comments)     Other reaction(s): did not help with pain   • Pollen Extract Sneezing   • Prednisone Other (See Comments)     hypertension  Tolerates steroidal inhalers  Tolerates steroidal inhalers     • Pregabalin Other (See Comments)     Pt stated she is unsure why she can't take, just knows she cant take the med. agitation     • Prochlorperazine Other (See Comments)     Extreme agitation  Extreme agitation     • Rofecoxib Other (See Comments)     Other reaction(s): ineffective   • Sertraline Other (See Comments)     agitation  agitation     • Tapentadol GI Intolerance   • Amiloride Rash     Other reaction(s): hypotensive   • Celecoxib Rash     Pt stated she is unsure why she can't take, just knows she cant take the med. • Clonazepam Rash   • Penicillins Hives and Rash       Physical Exam:    /82   Pulse 81   Ht 4' 11" (1.499 m)   Wt 108 kg (237 lb)   BMI 47.87 kg/m²     Constitutional:normal, well developed, well nourished, alert, in no distress and non-toxic and no overt pain behavior. Eyes:anicteric  HEENT:grossly intact  Neck:supple, symmetric, trachea midline and no masses   Pulmonary:even and unlabored  Cardiovascular:No edema or pitting edema present  Skin:Normal without rashes or lesions and well hydrated  Psychiatric:Mood and affect appropriate  Neurologic:Cranial Nerves II-XII grossly intact  Musculoskeletal:antalgic gait, ambulates with a rolling walker.       Imaging  FL spine and pain procedure    (Results Pending)   FL spine and pain procedure    (Results Pending)         Orders Placed This Encounter   Procedures   • [place] spine and pain procedure   • [place] spine and pain procedure

## 2023-09-28 ENCOUNTER — OFFICE VISIT (OUTPATIENT)
Dept: PAIN MEDICINE | Facility: CLINIC | Age: 78
End: 2023-09-28
Payer: MEDICARE

## 2023-09-28 VITALS
HEIGHT: 59 IN | WEIGHT: 237 LBS | DIASTOLIC BLOOD PRESSURE: 82 MMHG | HEART RATE: 81 BPM | SYSTOLIC BLOOD PRESSURE: 130 MMHG | BODY MASS INDEX: 47.78 KG/M2

## 2023-09-28 DIAGNOSIS — M48.061 SPINAL STENOSIS OF LUMBAR REGION, UNSPECIFIED WHETHER NEUROGENIC CLAUDICATION PRESENT: Primary | ICD-10-CM

## 2023-09-28 DIAGNOSIS — M19.011 PRIMARY OSTEOARTHRITIS OF RIGHT SHOULDER: ICD-10-CM

## 2023-09-28 DIAGNOSIS — M47.816 LUMBAR SPONDYLOSIS: ICD-10-CM

## 2023-09-28 DIAGNOSIS — M54.16 LUMBAR RADICULOPATHY: ICD-10-CM

## 2023-09-28 PROCEDURE — 99214 OFFICE O/P EST MOD 30 MIN: CPT | Performed by: NURSE PRACTITIONER

## 2023-10-17 ENCOUNTER — TELEPHONE (OUTPATIENT)
Age: 78
End: 2023-10-17

## 2023-10-17 NOTE — TELEPHONE ENCOUNTER
Caller: nadira Gomez    Doctor: Sarah Braswell    Reason for: the pt needs to r/s her procedure. Her transportation from [employer] never picked her up.   Please call to r/s    Call back#: [phone]

## 2023-10-24 ENCOUNTER — APPOINTMENT (OUTPATIENT)
Dept: LAB | Facility: HOSPITAL | Age: 78
End: 2023-10-24
Payer: MEDICARE

## 2023-10-24 DIAGNOSIS — D50.8 OTHER IRON DEFICIENCY ANEMIA: ICD-10-CM

## 2023-10-24 LAB
FERRITIN SERPL-MCNC: 69 NG/ML (ref 11–307)
VIT B12 SERPL-MCNC: 182 PG/ML (ref 180–914)

## 2023-10-24 PROCEDURE — 36415 COLL VENOUS BLD VENIPUNCTURE: CPT

## 2023-10-24 PROCEDURE — 83550 IRON BINDING TEST: CPT

## 2023-10-24 PROCEDURE — 82607 VITAMIN B-12: CPT

## 2023-10-24 PROCEDURE — 82728 ASSAY OF FERRITIN: CPT

## 2023-10-24 PROCEDURE — 83540 ASSAY OF IRON: CPT

## 2023-10-25 LAB
IRON SATN MFR SERPL: 34 % (ref 15–50)
IRON SERPL-MCNC: 88 UG/DL (ref 50–212)
TIBC SERPL-MCNC: 258 UG/DL (ref 250–450)
UIBC SERPL-MCNC: 170 UG/DL (ref 155–355)

## 2023-11-14 ENCOUNTER — HOSPITAL ENCOUNTER (OUTPATIENT)
Dept: RADIOLOGY | Facility: CLINIC | Age: 78
Discharge: HOME | End: 2023-11-14
Payer: MEDICARE

## 2023-11-14 ENCOUNTER — TELEPHONE (OUTPATIENT)
Dept: PAIN MEDICINE | Facility: CLINIC | Age: 78
End: 2023-11-14

## 2023-11-14 VITALS
TEMPERATURE: 98.3 F | HEART RATE: 72 BPM | SYSTOLIC BLOOD PRESSURE: 133 MMHG | OXYGEN SATURATION: 99 % | RESPIRATION RATE: 18 BRPM | DIASTOLIC BLOOD PRESSURE: 60 MMHG

## 2023-11-14 DIAGNOSIS — M25.572 CHRONIC PAIN OF LEFT ANKLE: Primary | ICD-10-CM

## 2023-11-14 DIAGNOSIS — M19.011 PRIMARY OSTEOARTHRITIS OF RIGHT SHOULDER: ICD-10-CM

## 2023-11-14 DIAGNOSIS — G89.29 CHRONIC PAIN OF LEFT ANKLE: Primary | ICD-10-CM

## 2023-11-14 PROCEDURE — 77002 NEEDLE LOCALIZATION BY XRAY: CPT | Performed by: ANESTHESIOLOGY

## 2023-11-14 PROCEDURE — 20610 DRAIN/INJ JOINT/BURSA W/O US: CPT | Performed by: ANESTHESIOLOGY

## 2023-11-14 PROCEDURE — A9585 GADOBUTROL INJECTION: HCPCS | Performed by: ANESTHESIOLOGY

## 2023-11-14 PROCEDURE — 77002 NEEDLE LOCALIZATION BY XRAY: CPT

## 2023-11-14 RX ORDER — LIDOCAINE HYDROCHLORIDE 10 MG/ML
5 INJECTION, SOLUTION EPIDURAL; INFILTRATION; INTRACAUDAL; PERINEURAL ONCE
Status: COMPLETED | OUTPATIENT
Start: 2023-11-14 | End: 2023-11-14

## 2023-11-14 RX ORDER — METHYLPREDNISOLONE ACETATE 40 MG/ML
40 INJECTION, SUSPENSION INTRA-ARTICULAR; INTRALESIONAL; INTRAMUSCULAR; PARENTERAL; SOFT TISSUE ONCE
Status: COMPLETED | OUTPATIENT
Start: 2023-11-14 | End: 2023-11-14

## 2023-11-14 RX ORDER — GADOBUTROL 604.72 MG/ML
1 INJECTION INTRAVENOUS ONCE
Status: COMPLETED | OUTPATIENT
Start: 2023-11-14 | End: 2023-11-14

## 2023-11-14 RX ORDER — BUPIVACAINE HCL/PF 2.5 MG/ML
30 VIAL (ML) INJECTION ONCE
Status: COMPLETED | OUTPATIENT
Start: 2023-11-14 | End: 2023-11-14

## 2023-11-14 RX ADMIN — LIDOCAINE HYDROCHLORIDE 5 ML: 10 INJECTION, SOLUTION EPIDURAL; INFILTRATION; INTRACAUDAL; PERINEURAL at 13:55

## 2023-11-14 RX ADMIN — METHYLPREDNISOLONE ACETATE 40 MG: 40 INJECTION, SUSPENSION INTRA-ARTICULAR; INTRALESIONAL; INTRAMUSCULAR; SOFT TISSUE at 13:55

## 2023-11-14 RX ADMIN — BUPIVACAINE HYDROCHLORIDE 30 ML: 2.5 INJECTION, SOLUTION EPIDURAL; INFILTRATION; INTRACAUDAL at 13:55

## 2023-11-14 RX ADMIN — GADOBUTROL 1 ML: 604.72 INJECTION INTRAVENOUS at 13:55

## 2023-11-14 NOTE — DISCHARGE INSTRUCTIONS

## 2023-11-14 NOTE — TELEPHONE ENCOUNTER
If she saw an orthopedic surgeon for her ankle she can just call to f/u with that surgeon since she's already established

## 2023-11-14 NOTE — H&P
History of Present Illness: The patient is a 69 y.o. female who presents with complaints of right shoulder pain.     Past Medical History:   Diagnosis Date    Anxiety     Arthritis     Depression     History of transfusion     Hypertension        Past Surgical History:   Procedure Laterality Date    APPENDECTOMY       SECTION      CHOLECYSTECTOMY      COLON SURGERY      DILATION AND CURETTAGE OF UTERUS      EYE SURGERY      GASTRIC BYPASS      HERNIA REPAIR      HYSTERECTOMY      JOINT REPLACEMENT           Current Outpatient Medications:     acetaminophen (TYLENOL) 500 mg tablet, Take 500 mg by mouth, Disp: , Rfl:     atenolol (TENORMIN) 50 mg tablet, Take 50 mg by mouth daily, Disp: , Rfl:     busPIRone (BUSPAR) 10 mg tablet, Take 10 mg by mouth 3 (three) times a day (Patient not taking: Reported on 2023), Disp: , Rfl:     Cholecalciferol 125 MCG (5000 UT) capsule, Take 10,000 Units by mouth, Disp: , Rfl:     Cholecalciferol 125 MCG (5000 UT) TABS, Take 5,000 Units by mouth, Disp: , Rfl:     Cranberry, Vacc oxycoccus, (Cranberry Extract) 200 MG CAPS, Take by mouth, Disp: , Rfl:     diphenhydrAMINE (Benadryl Allergy) 25 mg tablet, Take by mouth, Disp: , Rfl:     dorzolamide (TRUSOPT) 2 % ophthalmic solution, 1 drop, Disp: , Rfl:     fluticasone (Flonase Allergy Relief) 50 mcg/act nasal spray, into each nostril, Disp: , Rfl:     hydrochlorothiazide (HYDRODIURIL) 12.5 mg tablet, Take 12.5 mg by mouth daily, Disp: , Rfl:     Hypromellose 0.3 % SOLN, 1 drop 4 (four) times a day as needed, Disp: , Rfl:     latanoprost (XALATAN) 0.005 % ophthalmic solution, Apply 1 drop to eye, Disp: , Rfl:     lisinopril (ZESTRIL) 10 mg tablet, Take by mouth, Disp: , Rfl:     LORazepam (ATIVAN) 1 mg tablet, Take 1 mg by mouth 3 (three) times a day as needed, Disp: , Rfl:     methocarbamol (ROBAXIN) 500 mg tablet, Take 500 mg by mouth 2 (two) times a day as needed, Disp: , Rfl:     ondansetron (ZOFRAN) 4 mg tablet, Take 1 tablet (4 mg total) by mouth every 6 (six) hours as needed for nausea or vomiting, Disp: 12 tablet, Rfl: 0    sulfamethoxazole-trimethoprim (BACTRIM DS) 800-160 mg per tablet, , Disp: , Rfl:     traMADol (ULTRAM) 50 mg tablet, Take 50 mg by mouth every 6 (six) hours as needed, Disp: , Rfl:     venlafaxine (EFFEXOR) 75 mg tablet, Take 75 mg by mouth, Disp: , Rfl:     venlafaxine (EFFEXOR-XR) 150 mg 24 hr capsule, Take 150 mg by mouth daily, Disp: , Rfl:     Allergies   Allergen Reactions    Alprazolam Other (See Comments) and Hives     ineffective  ineffective      Azithromycin Hives    Cephalexin Hives    Codeine Hyperactivity, Other (See Comments) and Hives     Hyperactivity and agitation  Hyperactivity and agitation      Diclofenac GI Intolerance    Diclofenac Sodium Itching    Duloxetine Other (See Comments) and Hives     Dizziness and confusion  Dizziness and confusion      Duloxetine Hcl Dizziness and Other (See Comments)    Erythromycin Hives    Fentanyl Itching    Hydrochlorothiazide Other (See Comments)     Other reaction(s): hypotensive    Hydromorphone Hives    Ibuprofen Other (See Comments) and GI Intolerance     Stomach pains  Stomach pains      Iodinated Contrast Media Hives    Iodine - Food Allergy Itching     CT dye    Latex Itching    Medical Tape Itching and Other (See Comments)      Paper tape - skin tears off      Meloxicam GI Intolerance    Methadone Other (See Comments) and Hives     agitation  agitation      Methylprednisolone Other (See Comments)     Raises BP  Raises blood pressure      Milnacipran Itching    Mirtazapine Other (See Comments) and Hives     hallucinations  hallucinations      Molds & Smuts Sneezing    Morphine Other (See Comments)     Agitation  Agitation if on for more than a few days  Agitation if on for more than a few days      Naloxone Other (See Comments)    Naproxen Other (See Comments) and GI Bleeding     Stomach pains  Stomach pain      Nefazodone Hives Nitrofurantoin Hives    Oxycodone-Acetaminophen Itching and Hives     Agitation  Itchy and agitation      Pentazocine Other (See Comments)     Other reaction(s): did not help with pain    Pollen Extract Sneezing    Prednisone Other (See Comments)     hypertension  Tolerates steroidal inhalers  Tolerates steroidal inhalers      Pregabalin Other (See Comments)     Pt stated she is unsure why she can't take, just knows she cant take the med. agitation      Prochlorperazine Other (See Comments)     Extreme agitation  Extreme agitation      Rofecoxib Other (See Comments)     Other reaction(s): ineffective    Sertraline Other (See Comments)     agitation  agitation      Tapentadol GI Intolerance    Amiloride Rash     Other reaction(s): hypotensive    Celecoxib Rash     Pt stated she is unsure why she can't take, just knows she cant take the med. Clonazepam Rash    Penicillins Hives and Rash       Physical Exam:   Vitals:    11/14/23 1333   Resp: 20   Temp: 98.3 °F (36.8 °C)     General: Awake, Alert, Oriented x 3, Mood and affect appropriate  Respiratory: Respirations even and unlabored  Cardiovascular: Peripheral pulses intact; no edema  Musculoskeletal Exam: Painful range of motion of right shoulder    ASA Score: 3         Assessment:   1.  Primary osteoarthritis of right shoulder        Plan: Right intra-articular shoulder injection

## 2023-11-14 NOTE — TELEPHONE ENCOUNTER
Patient is inquiring to see if you would recommend an orthopedic physician because she broke her ankle a year ago and she is having difficulty ambulating. What do you think? She also stated that [employer] is requesting records from us.  Thanks

## 2023-11-15 ENCOUNTER — TELEPHONE (OUTPATIENT)
Age: 78
End: 2023-11-15

## 2023-11-15 NOTE — TELEPHONE ENCOUNTER
Patient is being referred to a orthopedics. Please schedule accordingly.     [address] [facility]   [phone]

## 2023-11-21 ENCOUNTER — TELEPHONE (OUTPATIENT)
Dept: PAIN MEDICINE | Facility: CLINIC | Age: 78
End: 2023-11-21

## 2023-11-21 NOTE — TELEPHONE ENCOUNTER
Caller: Patricia Mariscal  Doctor/office: Dr Sary Anguiano   #: [phone]    % of improvement: 20-50%  Pain Scale (1-10): 7/10

## 2023-11-28 ENCOUNTER — TELEPHONE (OUTPATIENT)
Age: 78
End: 2023-11-28

## 2023-11-28 NOTE — TELEPHONE ENCOUNTER
Pt called stating that she has a headache and would like to reschedule her procedure     Pt can be reached at [phone]

## 2023-11-30 ENCOUNTER — TELEPHONE (OUTPATIENT)
Dept: RADIOLOGY | Facility: CLINIC | Age: 78
End: 2023-11-30

## 2023-11-30 NOTE — TELEPHONE ENCOUNTER
Caller: Patricia    Doctor: dr Adi Avelar    Reason for call: pt returning nurses call to reschedule    Call back#: [phone]

## 2023-12-05 ENCOUNTER — OFFICE VISIT (OUTPATIENT)
Dept: OBGYN CLINIC | Facility: CLINIC | Age: 78
End: 2023-12-05
Payer: MEDICARE

## 2023-12-05 ENCOUNTER — APPOINTMENT (OUTPATIENT)
Dept: RADIOLOGY | Facility: AMBULARY SURGERY CENTER | Age: 78
End: 2023-12-05
Attending: ORTHOPAEDIC SURGERY
Payer: MEDICARE

## 2023-12-05 VITALS — BODY MASS INDEX: 47.78 KG/M2 | WEIGHT: 237 LBS | HEIGHT: 59 IN

## 2023-12-05 DIAGNOSIS — M76.822 POSTERIOR TIBIAL TENDON DYSFUNCTION, LEFT: Primary | ICD-10-CM

## 2023-12-05 DIAGNOSIS — M46.1 INFLAMMATION OF SACROILIAC JOINT (HCC): ICD-10-CM

## 2023-12-05 DIAGNOSIS — Z01.89 ENCOUNTER FOR LOWER EXTREMITY COMPARISON IMAGING STUDY: ICD-10-CM

## 2023-12-05 DIAGNOSIS — G89.29 CHRONIC PAIN OF LEFT ANKLE: ICD-10-CM

## 2023-12-05 DIAGNOSIS — E66.01 CLASS 3 SEVERE OBESITY IN ADULT, UNSPECIFIED BMI, UNSPECIFIED OBESITY TYPE, UNSPECIFIED WHETHER SERIOUS COMORBIDITY PRESENT (HCC): ICD-10-CM

## 2023-12-05 DIAGNOSIS — M25.572 CHRONIC PAIN OF LEFT ANKLE: ICD-10-CM

## 2023-12-05 DIAGNOSIS — Q66.6 CONGENITAL HINDFOOT VALGUS: ICD-10-CM

## 2023-12-05 DIAGNOSIS — M19.172 POST-TRAUMATIC ARTHRITIS OF LEFT ANKLE: ICD-10-CM

## 2023-12-05 PROBLEM — E66.813: Status: ACTIVE | Noted: 2023-12-05

## 2023-12-05 PROCEDURE — 73610 X-RAY EXAM OF ANKLE: CPT

## 2023-12-05 PROCEDURE — 73600 X-RAY EXAM OF ANKLE: CPT

## 2023-12-05 PROCEDURE — 99204 OFFICE O/P NEW MOD 45 MIN: CPT | Performed by: ORTHOPAEDIC SURGERY

## 2023-12-05 NOTE — PROGRESS NOTES
Janneth Victor M.D. Attending, Orthopaedic Surgery  Foot and [facility]      ORTHOPAEDIC FOOT AND ANKLE CLINIC VISIT     Assessment:     Encounter Diagnoses   Name Primary? Chronic pain of left ankle     Encounter for lower extremity comparison imaging study     Posterior tibial tendon dysfunction, left Yes    Congenital hindfoot valgus     Post-traumatic arthritis of left ankle     Inflammation of sacroiliac joint (HCC)     Class 3 severe obesity in adult, unspecified BMI, unspecified obesity type, unspecified whether serious comorbidity present [facility])             Plan:   The patient verbalized understanding of exam findings and treatment plan. We engaged in the shared decision-making process and treatment options were discussed at length with the patient. Surgical and conservative management discussed today along with risks and benefits. Post-traumatic left ankle arthritis with PTTD and hindfoot valgus  [facility] In [facility] [employer] of the left ankle with progression of 1 hr per day until she is wearing it most of the day  Return in about 3 months (around 3/5/2024) for Recheck. History of Present Illness:   Chief Complaint:   Chief Complaint   Patient presents with    Left Ankle - Pain     Patient is having left ankle pain. Johana Sutherland is a 69 y.o. female who is being seen for left chronic ankle pain. She had a trimalleolar ankle fracture that was treated non-operatively in a cast 1/21/22. Pain is localized over the left ankle with minimal radiating and described as sharp and severe. Patient denies numbness, tingling or radicular pain. Has history of neuropathy. Patient does not smoke, does have diabetes and does  take blood thinners. Patient denies family history of anesthesia complications and has not had any complications with anesthesia.      Pain/symptom timing:  Worse during the day when active  Pain/symptom context:  Worse with activites and work  Pain/symptom modifying factors:  Rest makes better, activities make worse  Pain/symptom associated signs/symptoms: none    Prior treatment   NSAIDsYes   Injections Yes   Bracing/Orthotics No    Physical Therapy No     Orthopedic Surgical History:   See Below    Past Medical, Surgical and Social History:  Past Medical History:  has a past medical history of Anxiety, Arthritis, Depression, History of transfusion, and Hypertension. Problem List: does not have any pertinent problems on file. Past Surgical History:  has a past surgical history that includes Dilation and curettage of uterus; Gastric bypass; Appendectomy; Colon surgery; Eye surgery; Hernia repair; Hysterectomy; Joint replacement; Cholecystectomy; and  section. Family History: family history is not on file. Social History:  reports that she has never smoked. She has never used smokeless tobacco. She reports that she does not drink alcohol and does not use drugs. Current Medications: has a current medication list which includes the following prescription(s): acetaminophen, atenolol, cholecalciferol, cholecalciferol, cranberry extract, diphenhydramine, dorzolamide, fluticasone, hydrochlorothiazide, hypromellose, latanoprost, lisinopril, lorazepam, methocarbamol, ondansetron, sulfamethoxazole-trimethoprim, tramadol, venlafaxine, buspirone, and venlafaxine.   Allergies: is allergic to alprazolam, azithromycin, cephalexin, codeine, diclofenac, diclofenac sodium, duloxetine, duloxetine hcl, erythromycin, fentanyl, hydrochlorothiazide, hydromorphone, ibuprofen, iodinated contrast media, iodine - food allergy, latex, medical tape, meloxicam, methadone, methylprednisolone, milnacipran, mirtazapine, molds & smuts, morphine, naloxone, naproxen, nefazodone, nitrofurantoin, oxycodone-acetaminophen, pentazocine, pollen extract, prednisone, pregabalin, prochlorperazine, rofecoxib, sertraline, tapentadol, amiloride, celecoxib, clonazepam, and penicillins. Review of Systems:  General- denies fever/chills  HEENT- denies hearing loss or sore throat  Eyes- denies eye pain or visual disturbances, denies red eyes  Respiratory- denies cough or SOB  Cardio- denies chest pain or palpitations  GI- denies abdominal pain  Endocrine- denies urinary frequency  Urinary- denies pain with urination  Musculoskeletal- Negative except noted above  Skin- denies rashes or wounds  Neurological- denies dizziness or headache  Psychiatric- denies anxiety or difficulty concentrating    Physical Exam:   Ht 4' 11" (1.499 m)   Wt 108 kg (237 lb)   BMI 47.87 kg/m²   General/Constitutional: No apparent distress: well-nourished and well developed. Eyes: normal ocular motion  Cardio: RRR, Normal S1S2, No m/r/g  Lymphatic: No appreciable lymphadenopathy  Respiratory: Non-labored breathing, CTA b/l no w/c/r  Vascular: No edema, swelling or tenderness, except as noted in detailed exam.  Integumentary: No impressive skin lesions present, except as noted in detailed exam.  Neuro: No ataxia or tremors noted  Psych: Normal mood and affect, oriented to person, place and time. Appropriate affect. Musculoskeletal: Normal, except as noted in detailed exam and in HPI. Examination    Left    Gait Antalgic   Musculoskeletal Tender to palpation at the tibiotalar joint    Skin Normal.      Nails Normal    Range of Motion  10 degrees dorsiflexion, 20 degrees plantarflexion  Subtalar motion: 10 degrees inversion and eversion    Stability Stable    Muscle Strength 5/5 tibialis anterior  5/5 gastrocnemius-soleus  5/5 posterior tibialis  5/5 peroneal/eversion strength  5/5 EHL  5/5 FHL    Neurologic Normal    Sensation Intact to light touch throughout sural, saphenous, superficial peroneal, deep peroneal and medial/lateral plantar nerve distributions. Readlyn-Cassandra 5.07 filament (10g) testing  deferred.     Cardiovascular Brisk capillary refill < 2 seconds,intact DP and PT pulses    Special Tests None      Imaging Studies:   3 views of the left ankle were taken, reviewed and interpreted independently that demonstrate severe tibiotalar post-traumatic arthritis and a distal fibular non-union. Reviewed by me personally. Charliene Rife. Lachman, MD  Foot & Ankle Surgery   Department of [address] [place] [address]      I personally performed the service. Charliene Rife. Lachman, MD

## 2023-12-05 NOTE — PATIENT INSTRUCTIONS
Today, We recommended a brace/prosthesis for you. [facility] certified pedorthotists make orthotics but not braces or prosthesis. Below are the companies in the area that make these, Please call ahead for an appointment and to ensure the company accepts your insurance. Make sure to bring the prescription given to you in the office today to the company for the brace/prosthesis. [address]  [address] [place]  [address] [facility],6Th Floor. [place], 125 [facility] Phone: [phone]  [place] Fax: [phone]    [address] [facility],6Th Floor  [address] [facility] [address]  [address]  (By Appointment Only)  Directions  [address]  [address] [facility]  [place], 821 [facility]  Directions  [place] Phone: [phone]  [place] Fax: 2100 [facility] Location  [address] [facility] 28. 1214 [facility] [phone] (fax)    [facility]  [address] [place], [address]  [facility]  [phone] (fax)    [facility]  [address] [facility], [address] [place] [address] Drive  [phone] (fax)    [facility] Location  [address] [place] [address], [address] [place] [address]  [phone] (fax)    [facility]  [address], [address], [address] [place] [address] (fax)  Ang[employer]a [employer] are good brands but I recommend going to a dedicate shoe store (not [place] or [employer].) At these types of stores, they have experts that can fit you for shoes appropriate for your foot problem. Shoe choice is essential to solving/improving most types of foot pain. Even after a surgery, good shoes are necessary to keep the foot as comfortable as possible.     [employer]  [address] [place] [id number]  [address] [place]  [phone]    [place] [address] [address] [place], [place] 111 [facility] 100 [facility], [facility]0 [place]    [employer]  [address] [place], [address] [facility] [address]  [phone]    [facility]  [address], [place], 301 [facility]   [address] [facility], [address]  [phone]    [employer]  [address] [facility], [address]  898 [facility]  1912 [facility], [address]   [phone]

## 2023-12-27 ENCOUNTER — HOSPITAL ENCOUNTER (OUTPATIENT)
Dept: RADIOLOGY | Facility: CLINIC | Age: 78
Discharge: HOME | End: 2023-12-27
Admitting: ANESTHESIOLOGY
Payer: MEDICARE

## 2023-12-27 VITALS
OXYGEN SATURATION: 100 % | SYSTOLIC BLOOD PRESSURE: 158 MMHG | DIASTOLIC BLOOD PRESSURE: 61 MMHG | RESPIRATION RATE: 20 BRPM | HEART RATE: 65 BPM | TEMPERATURE: 97.7 F

## 2023-12-27 DIAGNOSIS — M54.16 LUMBAR RADICULOPATHY: ICD-10-CM

## 2023-12-27 PROCEDURE — 64484 NJX AA&/STRD TFRM EPI L/S EA: CPT | Performed by: ANESTHESIOLOGY

## 2023-12-27 PROCEDURE — 64483 NJX AA&/STRD TFRM EPI L/S 1: CPT | Performed by: ANESTHESIOLOGY

## 2023-12-27 PROCEDURE — A9585 GADOBUTROL INJECTION: HCPCS | Performed by: ANESTHESIOLOGY

## 2023-12-27 RX ORDER — PAPAVERINE HCL 150 MG
15 CAPSULE, EXTENDED RELEASE ORAL ONCE
Status: COMPLETED | OUTPATIENT
Start: 2023-12-27 | End: 2023-12-27

## 2023-12-27 RX ORDER — GADOBUTROL 604.72 MG/ML
2 INJECTION INTRAVENOUS ONCE
Status: COMPLETED | OUTPATIENT
Start: 2023-12-27 | End: 2023-12-27

## 2023-12-27 RX ADMIN — DEXAMETHASONE SODIUM PHOSPHATE 15 MG: 10 INJECTION, SOLUTION INTRAMUSCULAR; INTRAVENOUS at 10:57

## 2023-12-27 RX ADMIN — GADOBUTROL 2 ML: 604.72 INJECTION INTRAVENOUS at 10:57

## 2023-12-27 RX ADMIN — LIDOCAINE HYDROCHLORIDE 2 ML: 20 INJECTION, SOLUTION EPIDURAL; INFILTRATION; INTRACAUDAL; PERINEURAL at 10:54

## 2023-12-27 NOTE — H&P
History of Present Illness: The patient is a 78 y.o. female who presents with complaints of low back and leg pain.    Past Medical History:   Diagnosis Date    Anxiety     Arthritis     Depression     History of transfusion     Hypertension        Past Surgical History:   Procedure Laterality Date    APPENDECTOMY       SECTION      CHOLECYSTECTOMY      COLON SURGERY      DILATION AND CURETTAGE OF UTERUS      EYE SURGERY      GASTRIC BYPASS      HERNIA REPAIR      HYSTERECTOMY      JOINT REPLACEMENT           Current Outpatient Medications:     acetaminophen (TYLENOL) 500 mg tablet, Take 500 mg by mouth, Disp: , Rfl:     atenolol (TENORMIN) 50 mg tablet, Take 50 mg by mouth daily, Disp: , Rfl:     busPIRone (BUSPAR) 10 mg tablet, Take 10 mg by mouth 3 (three) times a day (Patient not taking: Reported on 2023), Disp: , Rfl:     Cholecalciferol 125 MCG (5000 UT) capsule, Take 10,000 Units by mouth, Disp: , Rfl:     Cholecalciferol 125 MCG (5000 UT) TABS, Take 5,000 Units by mouth, Disp: , Rfl:     Cranberry, Vacc oxycoccus, (Cranberry Extract) 200 MG CAPS, Take by mouth, Disp: , Rfl:     diphenhydrAMINE (Benadryl Allergy) 25 mg tablet, Take by mouth, Disp: , Rfl:     dorzolamide (TRUSOPT) 2 % ophthalmic solution, 1 drop, Disp: , Rfl:     fluticasone (Flonase Allergy Relief) 50 mcg/act nasal spray, into each nostril, Disp: , Rfl:     hydrochlorothiazide (HYDRODIURIL) 12.5 mg tablet, Take 12.5 mg by mouth daily, Disp: , Rfl:     Hypromellose 0.3 % SOLN, 1 drop 4 (four) times a day as needed, Disp: , Rfl:     latanoprost (XALATAN) 0.005 % ophthalmic solution, Apply 1 drop to eye, Disp: , Rfl:     lisinopril (ZESTRIL) 10 mg tablet, Take by mouth, Disp: , Rfl:     LORazepam (ATIVAN) 1 mg tablet, Take 1 mg by mouth 3 (three) times a day as needed, Disp: , Rfl:     methocarbamol (ROBAXIN) 500 mg tablet, Take 500 mg by mouth 2 (two) times a day as needed, Disp: , Rfl:     ondansetron (ZOFRAN) 4 mg tablet, Take 1  tablet (4 mg total) by mouth every 6 (six) hours as needed for nausea or vomiting, Disp: 12 tablet, Rfl: 0    sulfamethoxazole-trimethoprim (BACTRIM DS) 800-160 mg per tablet, , Disp: , Rfl:     traMADol (ULTRAM) 50 mg tablet, Take 50 mg by mouth every 6 (six) hours as needed, Disp: , Rfl:     venlafaxine (EFFEXOR) 75 mg tablet, Take 75 mg by mouth, Disp: , Rfl:     venlafaxine (EFFEXOR-XR) 150 mg 24 hr capsule, Take 150 mg by mouth daily, Disp: , Rfl:     Allergies   Allergen Reactions    Alprazolam Other (See Comments) and Hives     ineffective  ineffective      Azithromycin Hives    Cephalexin Hives    Codeine Hyperactivity, Other (See Comments) and Hives     Hyperactivity and agitation  Hyperactivity and agitation      Diclofenac GI Intolerance    Diclofenac Sodium Itching    Duloxetine Other (See Comments) and Hives     Dizziness and confusion  Dizziness and confusion      Duloxetine Hcl Dizziness and Other (See Comments)    Erythromycin Hives    Fentanyl Itching    Hydrochlorothiazide Other (See Comments)     Other reaction(s): hypotensive    Hydromorphone Hives    Ibuprofen Other (See Comments) and GI Intolerance     Stomach pains  Stomach pains      Iodinated Contrast Media Hives    Iodine - Food Allergy Itching     CT dye    Latex Itching    Medical Tape Itching and Other (See Comments)      Paper tape - skin tears off      Meloxicam GI Intolerance    Methadone Other (See Comments) and Hives     agitation  agitation      Methylprednisolone Other (See Comments)     Raises BP  Raises blood pressure      Milnacipran Itching    Mirtazapine Other (See Comments) and Hives     hallucinations  hallucinations      Molds & Smuts Sneezing    Morphine Other (See Comments)     Agitation  Agitation if on for more than a few days  Agitation if on for more than a few days      Naloxone Other (See Comments)    Naproxen Other (See Comments) and GI Bleeding     Stomach pains  Stomach pain      Nefazodone Hives     Nitrofurantoin Hives    Oxycodone-Acetaminophen Itching and Hives     Agitation  Itchy and agitation      Pentazocine Other (See Comments)     Other reaction(s): did not help with pain    Pollen Extract Sneezing    Prednisone Other (See Comments)     hypertension  Tolerates steroidal inhalers  Tolerates steroidal inhalers      Pregabalin Other (See Comments)     Pt stated she is unsure why she can't take, just knows she cant take the med.  agitation      Prochlorperazine Other (See Comments)     Extreme agitation  Extreme agitation      Rofecoxib Other (See Comments)     Other reaction(s): ineffective    Sertraline Other (See Comments)     agitation  agitation      Tapentadol GI Intolerance    Amiloride Rash     Other reaction(s): hypotensive    Celecoxib Rash     Pt stated she is unsure why she can't take, just knows she cant take the med.      Clonazepam Rash    Penicillins Hives and Rash       Physical Exam:   Vitals:    12/27/23 1031   BP: 141/74   Pulse: 65   Resp: 20   Temp: 97.7 °F (36.5 °C)   SpO2: 100%     General: Awake, Alert, Oriented x 3, Mood and affect appropriate  Respiratory: Respirations even and unlabored  Cardiovascular: Peripheral pulses intact; no edema  Musculoskeletal Exam: Right lumbar paraspinals tender to palpation    ASA Score: 3    Patient/Chart Verification  Patient ID Verified: Verbal  ID Band Applied: No  Consents Confirmed: Procedural, To be obtained in the Pre-Procedure area  Interval H&P(within 24 hr) Complete (required for Outpatients and Surgery Admit only): To be obtained in the Pre-Procedure area  Allergies Reviewed: Yes  Anticoag/NSAID held?: NA  Currently on antibiotics?: Yes    Assessment:   1. Lumbar radiculopathy        Plan: Right L3 and L4 TFESI

## 2023-12-27 NOTE — DISCHARGE INSTR - LAB
Epidural Steroid Injection   WHAT YOU NEED TO KNOW:   An epidural steroid injection (ALONSO) is a procedure to inject steroid medicine into the epidural space. The epidural space is between your spinal cord and vertebrae. Steroids reduce inflammation and fluid buildup in your spine that may be causing pain. You may be given pain medicine along with the steroids.          ACTIVITY  Do not drive or operate machinery today.  No strenuous activity today - bending, lifting, etc.  You may resume normal activites starting tomorrow - start slowly and as tolerated.  You may shower today, but no tub baths or hot tubs.  You may have numbness for several hours from the local anesthetic. Please use caution and common sense, especially with weight-bearing activities.    CARE OF THE INJECTION SITE  If you have soreness or pain, apply ice to the area today (20 minutes on/20 minutes off).  Starting tomorrow, you may use warm, moist heat or ice if needed.  You may have an increase or change in your discomfort for 36-48 hours after your treatment.  Apply ice and continue with any pain medication you have been prescribed.  Notify the [facility] if you have any of the following: redness, drainage, swelling, headache, stiff neck or fever above 100°F.    SPECIAL INSTRUCTIONS  Our office will contact you in approximately 7 days for a progress report.    MEDICATIONS  Continue to take all routine medications.  Our office may have instructed you to hold some medications.    As no general anesthesia was used in today's procedure, you should not experience any side effects related to anesthesia.     If you are diabetic, the steroids used in today's injection may temporarily increase your blood sugar levels after the first few days after your injection. Please keep a close eye on your sugars and alert the doctor who manages your diabetes if your sugars are significantly high from your baseline or you are symptomatic.     If you have a  problem specifically related to your procedure, please call our office at [phone].  Problems not related to your procedure should be directed to your primary care physician.

## 2024-01-03 ENCOUNTER — TELEPHONE (OUTPATIENT)
Dept: PAIN MEDICINE | Facility: CLINIC | Age: 79
End: 2024-01-03

## 2024-01-10 NOTE — TELEPHONE ENCOUNTER
Caller: Patricia  Doctor/office: dr beal  CB#: [phone]    % of improvement: 75%  Pain Scale (1-10): 6-7/10      Still gets cramping in leg and gets cramping on underside of backside as well but noted there has been a little more improvement. Pain does happen with activity so movement is limited. Pt advised pain has come back to the hip area which is something she would like to discuss. Pt advised that she does feel better with tramadol for her hip but not for her leg

## 2024-01-31 ENCOUNTER — OFFICE VISIT (OUTPATIENT)
Dept: PAIN MEDICINE | Facility: CLINIC | Age: 79
End: 2024-01-31
Payer: MEDICARE

## 2024-01-31 VITALS
HEIGHT: 59 IN | BODY MASS INDEX: 47.78 KG/M2 | HEART RATE: 67 BPM | WEIGHT: 237 LBS | SYSTOLIC BLOOD PRESSURE: 118 MMHG | DIASTOLIC BLOOD PRESSURE: 54 MMHG

## 2024-01-31 DIAGNOSIS — M54.12 CERVICAL RADICULOPATHY: ICD-10-CM

## 2024-01-31 DIAGNOSIS — M54.2 NECK PAIN: Primary | ICD-10-CM

## 2024-01-31 DIAGNOSIS — M70.61 TROCHANTERIC BURSITIS OF RIGHT HIP: ICD-10-CM

## 2024-01-31 PROCEDURE — 99214 OFFICE O/P EST MOD 30 MIN: CPT | Performed by: NURSE PRACTITIONER

## 2024-02-08 ENCOUNTER — HOSPITAL ENCOUNTER (OUTPATIENT)
Dept: RADIOLOGY | Facility: IMAGING CENTER | Age: 79
End: 2024-02-08
Payer: MEDICARE

## 2024-02-08 DIAGNOSIS — M54.12 CERVICAL RADICULOPATHY: ICD-10-CM

## 2024-02-08 PROCEDURE — 72141 MRI NECK SPINE W/O DYE: CPT

## 2024-02-14 ENCOUNTER — TELEPHONE (OUTPATIENT)
Dept: PAIN MEDICINE | Facility: CLINIC | Age: 79
End: 2024-02-14

## 2024-02-14 DIAGNOSIS — M54.12 CERVICAL RADICULOPATHY: Primary | ICD-10-CM

## 2024-02-14 NOTE — TELEPHONE ENCOUNTER
S/w pt and advised of same. Pt would like to move forward with CLAY. Pt denies anticoags and diabetes.      Pt is scheduled for GTB on 2/20/24. If possible, pt would like to have CLAY done before GTB as her neck pain is worse.     Pt advised she would be contacted to schedule procedure.

## 2024-02-14 NOTE — TELEPHONE ENCOUNTER
Called patient scheduled procedure. Reviewed all instructions by phone upon scheduling  Mailed copy to home      FYJUS patient wanted me to make you aware that she does take Bactrim regularly.   Is this ok?

## 2024-02-14 NOTE — TELEPHONE ENCOUNTER
----- Message from JUNE Stokes sent at 2/14/2024  1:00 PM EST -----  MRI of the cervical spine reveals multilevel arthritis with various degrees of central and foraminal stenosis from C3-4 through C6-7, worst at C5-7.  I can offer the patient a C6-7 cervical epidural steroid injection

## 2024-02-20 ENCOUNTER — HOSPITAL ENCOUNTER (OUTPATIENT)
Dept: RADIOLOGY | Facility: CLINIC | Age: 79
Discharge: HOME | End: 2024-02-20
Admitting: ANESTHESIOLOGY
Payer: MEDICARE

## 2024-02-20 VITALS
OXYGEN SATURATION: 98 % | DIASTOLIC BLOOD PRESSURE: 61 MMHG | SYSTOLIC BLOOD PRESSURE: 178 MMHG | TEMPERATURE: 97.5 F | HEART RATE: 76 BPM | RESPIRATION RATE: 20 BRPM

## 2024-02-20 DIAGNOSIS — M70.61 TROCHANTERIC BURSITIS OF RIGHT HIP: ICD-10-CM

## 2024-02-20 PROCEDURE — A9585 GADOBUTROL INJECTION: HCPCS | Performed by: ANESTHESIOLOGY

## 2024-02-20 PROCEDURE — 77002 NEEDLE LOCALIZATION BY XRAY: CPT

## 2024-02-20 PROCEDURE — 20610 DRAIN/INJ JOINT/BURSA W/O US: CPT | Performed by: ANESTHESIOLOGY

## 2024-02-20 PROCEDURE — 77002 NEEDLE LOCALIZATION BY XRAY: CPT | Performed by: ANESTHESIOLOGY

## 2024-02-20 RX ORDER — METHYLPREDNISOLONE ACETATE 40 MG/ML
40 INJECTION, SUSPENSION INTRA-ARTICULAR; INTRALESIONAL; INTRAMUSCULAR; PARENTERAL; SOFT TISSUE ONCE
Status: COMPLETED | OUTPATIENT
Start: 2024-02-20 | End: 2024-02-20

## 2024-02-20 RX ORDER — BUPIVACAINE HCL/PF 2.5 MG/ML
3 VIAL (ML) INJECTION ONCE
Status: COMPLETED | OUTPATIENT
Start: 2024-02-20 | End: 2024-02-20

## 2024-02-20 RX ORDER — GADOBUTROL 604.72 MG/ML
1 INJECTION INTRAVENOUS ONCE
Status: DISCONTINUED | OUTPATIENT
Start: 2024-02-20 | End: 2024-02-20

## 2024-02-20 RX ORDER — LIDOCAINE HYDROCHLORIDE 10 MG/ML
4 INJECTION, SOLUTION EPIDURAL; INFILTRATION; INTRACAUDAL; PERINEURAL ONCE
Status: COMPLETED | OUTPATIENT
Start: 2024-02-20 | End: 2024-02-20

## 2024-02-20 RX ORDER — GADOBUTROL 604.72 MG/ML
1 INJECTION INTRAVENOUS ONCE
Status: COMPLETED | OUTPATIENT
Start: 2024-02-20 | End: 2024-02-20

## 2024-02-20 RX ADMIN — METHYLPREDNISOLONE ACETATE 40 MG: 40 INJECTION, SUSPENSION INTRA-ARTICULAR; INTRALESIONAL; INTRAMUSCULAR; SOFT TISSUE at 14:13

## 2024-02-20 RX ADMIN — LIDOCAINE HYDROCHLORIDE 4 ML: 10 INJECTION, SOLUTION EPIDURAL; INFILTRATION; INTRACAUDAL; PERINEURAL at 14:03

## 2024-02-20 RX ADMIN — BUPIVACAINE HYDROCHLORIDE 3 ML: 2.5 INJECTION, SOLUTION EPIDURAL; INFILTRATION; INTRACAUDAL at 14:13

## 2024-02-20 RX ADMIN — GADOBUTROL 1 ML: 604.72 INJECTION INTRAVENOUS at 14:12

## 2024-02-20 NOTE — H&P
History of Present Illness: The patient is a 78 y.o. female who presents with complaints of right hip pain.    Past Medical History:   Diagnosis Date    Anxiety     Arthritis     Depression     History of transfusion     Hypertension        Past Surgical History:   Procedure Laterality Date    APPENDECTOMY       SECTION      CHOLECYSTECTOMY      COLON SURGERY      DILATION AND CURETTAGE OF UTERUS      EYE SURGERY      GASTRIC BYPASS      HERNIA REPAIR      HYSTERECTOMY      JOINT REPLACEMENT           Current Outpatient Medications:     acetaminophen (TYLENOL) 500 mg tablet, Take 500 mg by mouth, Disp: , Rfl:     atenolol (TENORMIN) 50 mg tablet, Take 50 mg by mouth daily, Disp: , Rfl:     busPIRone (BUSPAR) 10 mg tablet, Take 10 mg by mouth 3 (three) times a day (Patient not taking: Reported on 2023), Disp: , Rfl:     Cholecalciferol 125 MCG (5000 UT) capsule, Take 10,000 Units by mouth, Disp: , Rfl:     Cholecalciferol 125 MCG (5000 UT) TABS, Take 5,000 Units by mouth, Disp: , Rfl:     Cranberry, Vacc oxycoccus, (Cranberry Extract) 200 MG CAPS, Take by mouth, Disp: , Rfl:     diphenhydrAMINE (Benadryl Allergy) 25 mg tablet, Take by mouth, Disp: , Rfl:     dorzolamide (TRUSOPT) 2 % ophthalmic solution, 1 drop, Disp: , Rfl:     fluticasone (Flonase Allergy Relief) 50 mcg/act nasal spray, into each nostril, Disp: , Rfl:     hydrochlorothiazide (HYDRODIURIL) 12.5 mg tablet, Take 12.5 mg by mouth daily, Disp: , Rfl:     Hypromellose 0.3 % SOLN, 1 drop 4 (four) times a day as needed, Disp: , Rfl:     latanoprost (XALATAN) 0.005 % ophthalmic solution, Apply 1 drop to eye, Disp: , Rfl:     lisinopril (ZESTRIL) 10 mg tablet, Take by mouth, Disp: , Rfl:     LORazepam (ATIVAN) 1 mg tablet, Take 1 mg by mouth 3 (three) times a day as needed, Disp: , Rfl:     methocarbamol (ROBAXIN) 500 mg tablet, Take 500 mg by mouth 2 (two) times a day as needed, Disp: , Rfl:     ondansetron (ZOFRAN) 4 mg tablet, Take 1 tablet (4  mg total) by mouth every 6 (six) hours as needed for nausea or vomiting, Disp: 12 tablet, Rfl: 0    sulfamethoxazole-trimethoprim (BACTRIM DS) 800-160 mg per tablet, , Disp: , Rfl:     traMADol (ULTRAM) 50 mg tablet, Take 50 mg by mouth every 6 (six) hours as needed, Disp: , Rfl:     venlafaxine (EFFEXOR) 75 mg tablet, Take 75 mg by mouth, Disp: , Rfl:     venlafaxine (EFFEXOR-XR) 150 mg 24 hr capsule, Take 150 mg by mouth daily, Disp: , Rfl:     Allergies   Allergen Reactions    Alprazolam Other (See Comments) and Hives     ineffective  ineffective      Azithromycin Hives    Cephalexin Hives    Codeine Hyperactivity, Other (See Comments) and Hives     Hyperactivity and agitation  Hyperactivity and agitation      Diclofenac GI Intolerance    Diclofenac Sodium Itching    Duloxetine Other (See Comments) and Hives     Dizziness and confusion  Dizziness and confusion      Duloxetine Hcl Dizziness and Other (See Comments)    Erythromycin Hives    Fentanyl Itching    Hydrochlorothiazide Other (See Comments)     Other reaction(s): hypotensive    Hydromorphone Hives    Ibuprofen Other (See Comments) and GI Intolerance     Stomach pains  Stomach pains      Iodinated Contrast Media Hives    Iodine - Food Allergy Itching     CT dye    Latex Itching    Medical Tape Itching and Other (See Comments)      Paper tape - skin tears off      Meloxicam GI Intolerance    Methadone Other (See Comments) and Hives     agitation  agitation      Methylprednisolone Other (See Comments)     Raises BP  Raises blood pressure      Milnacipran Itching    Mirtazapine Other (See Comments) and Hives     hallucinations  hallucinations      Molds & Smuts Sneezing    Morphine Other (See Comments)     Agitation  Agitation if on for more than a few days  Agitation if on for more than a few days      Naloxone Other (See Comments)    Naproxen Other (See Comments) and GI Bleeding     Stomach pains  Stomach pain      Nefazodone Hives    Nitrofurantoin Hives     Oxycodone-Acetaminophen Itching and Hives     Agitation  Itchy and agitation      Pentazocine Other (See Comments)     Other reaction(s): did not help with pain    Pollen Extract Sneezing    Prednisone Other (See Comments)     hypertension  Tolerates steroidal inhalers  Tolerates steroidal inhalers      Pregabalin Other (See Comments)     Pt stated she is unsure why she can't take, just knows she cant take the med.  agitation      Prochlorperazine Other (See Comments)     Extreme agitation  Extreme agitation      Rofecoxib Other (See Comments)     Other reaction(s): ineffective    Sertraline Other (See Comments)     agitation  agitation      Tapentadol GI Intolerance    Amiloride Rash     Other reaction(s): hypotensive    Celecoxib Rash     Pt stated she is unsure why she can't take, just knows she cant take the med.      Clonazepam Rash    Penicillins Hives and Rash       Physical Exam:   Vitals:    02/20/24 1336   BP: 147/56   Pulse: 71   Resp: 18   Temp: 97.5 °F (36.4 °C)   SpO2: 97%     General: Awake, Alert, Oriented x 3, Mood and affect appropriate  Respiratory: Respirations even and unlabored  Cardiovascular: Peripheral pulses intact; no edema  Musculoskeletal Exam: TTP over right trochanteric flare    ASA Score: 3         Assessment:   1. Trochanteric bursitis of right hip        Plan: Right GTB injection

## 2024-02-20 NOTE — DISCHARGE INSTRUCTIONS

## 2024-02-27 ENCOUNTER — TELEPHONE (OUTPATIENT)
Dept: PAIN MEDICINE | Facility: CLINIC | Age: 79
End: 2024-02-27

## 2024-02-27 NOTE — TELEPHONE ENCOUNTER
Patient Reports    60     %     improvement post injection    Pain Level   5  /10  Had some problems with bladder control

## 2024-03-05 ENCOUNTER — OFFICE VISIT (OUTPATIENT)
Dept: OBGYN CLINIC | Facility: CLINIC | Age: 79
End: 2024-03-05
Payer: MEDICARE

## 2024-03-05 VITALS — WEIGHT: 231 LBS | BODY MASS INDEX: 48.49 KG/M2 | HEIGHT: 58 IN

## 2024-03-05 DIAGNOSIS — M19.172 POST-TRAUMATIC ARTHRITIS OF LEFT ANKLE: Primary | ICD-10-CM

## 2024-03-05 PROCEDURE — 99213 OFFICE O/P EST LOW 20 MIN: CPT | Performed by: ORTHOPAEDIC SURGERY

## 2024-03-05 NOTE — PROGRESS NOTES
James R Lachman, M.D.  Attending, Orthopaedic Surgery  Foot and Ankle  [facility]      ORTHOPAEDIC FOOT AND ANKLE CLINIC VISIT     Assessment:     Encounter Diagnosis   Name Primary?    Post-traumatic arthritis of left ankle Yes            Plan:   The patient verbalized understanding of exam findings and treatment plan. We engaged in the shared decision-making process and treatment options were discussed at length with the patient. Surgical and conservative management discussed today along with risks and benefits.  Weight bearing as tolerated in [place] brace  Patient has her brace as of 1 week ago but has not been able to wear her brace yet  She has not found a shoe that the brace fits in  She was referred to [employer] or [employer] and instructed to bring her brace with her to the fitting so that she can get the correct shoes.  Follow up after she has been able to wear the [place] brace for at least 3 weeks full time  Return in 6 weeks (on 4/16/2024) for Recheck after youve been able to wear your brace for 1 month.      History of Present Illness:   Chief Complaint:   Chief Complaint   Patient presents with    Follow-up     Follow up. She brought all her stuff with her. Just got the boot a week ago. Left foot.      Patricia Mariscal is a 79 y.o. female who is being seen for left post-traumatic ankle arthritis. Last we saw her we recommended [place] brace and shoe to fit.  Pain is localized at left ankle with minimal radiating and described as sharp and severe. Patient denies numbness, tingling or radicular pain.  Denies history of neuropathy.  She has not been able to wear the brace since she obtained it 2 weeks ago. She also was not able to get fitted for shoes that fit her brace yet.    Orthopedic Surgical History:   none    Past Medical, Surgical and Social History:  Past Medical History:  has a past medical history of Anxiety, Arthritis, Depression, History of transfusion,  and Hypertension.  Problem List: does not have any pertinent problems on file.  Past Surgical History:  has a past surgical history that includes Dilation and curettage of uterus; Gastric bypass; Appendectomy; Colon surgery; Eye surgery; Hernia repair; Hysterectomy; Joint replacement; Cholecystectomy; and  section.  Family History: family history is not on file.  Social History:  reports that she has never smoked. She has never used smokeless tobacco. She reports that she does not drink alcohol and does not use drugs.  Current Medications: has a current medication list which includes the following prescription(s): acetaminophen, atenolol, cholecalciferol, cholecalciferol, cranberry extract, diphenhydramine, dorzolamide, fluticasone, hydrochlorothiazide, hypromellose, latanoprost, lisinopril, lorazepam, methocarbamol, ondansetron, sulfamethoxazole-trimethoprim, tramadol, venlafaxine, venlafaxine, and buspirone.  Allergies: is allergic to alprazolam, azithromycin, cephalexin, codeine, diclofenac, diclofenac sodium, duloxetine, duloxetine hcl, erythromycin, fentanyl, hydrochlorothiazide, hydromorphone, ibuprofen, iodinated contrast media, iodine - food allergy, latex, medical tape, meloxicam, methadone, methylprednisolone, milnacipran, mirtazapine, molds & smuts, morphine, naloxone, naproxen, nefazodone, nitrofurantoin, oxycodone-acetaminophen, pentazocine, pollen extract, prednisone, pregabalin, prochlorperazine, rofecoxib, sertraline, tapentadol, amiloride, celecoxib, clonazepam, and penicillins.     Review of Systems:  General- denies fever/chills  HEENT- denies hearing loss or sore throat  Eyes- denies eye pain or visual disturbances, denies red eyes  Respiratory- denies cough or SOB  Cardio- denies chest pain or palpitations  GI- denies abdominal pain  Endocrine- denies urinary frequency  Urinary- denies pain with urination  Musculoskeletal- Negative except noted above  Skin- denies rashes or  "wounds  Neurological- denies dizziness or headache  Psychiatric- denies anxiety or difficulty concentrating    Physical Exam:   Ht 4' 10\" (1.473 m)   Wt 105 kg (231 lb)   BMI 48.28 kg/m²   General/Constitutional: No apparent distress: well-nourished and well developed.  Eyes: normal ocular motion  Cardio: RRR, Normal S1S2, No m/r/g  Lymphatic: No appreciable lymphadenopathy  Respiratory: Non-labored breathing, CTA b/l no w/c/r  Vascular: No edema, swelling or tenderness, except as noted in detailed exam.  Integumentary: No impressive skin lesions present, except as noted in detailed exam.  Neuro: No ataxia or tremors noted  Psych: Normal mood and affect, oriented to person, place and time. Appropriate affect.  Musculoskeletal: Normal, except as noted in detailed exam and in HPI.    Examination    Left    Gait Antalgic and Waddling   Musculoskeletal Tender to palpation at left ankle    Skin Normal.      Nails Normal    Range of Motion  10 degrees dorsiflexion, 30 degrees plantarflexion  Subtalar motion: limited  Valgus alignment to ankle    Stability Stable    Muscle Strength 5/5 tibialis anterior  5/5 gastrocnemius-soleus  5/5 posterior tibialis  5/5 peroneal/eversion strength  5/5 EHL  5/5 FHL    Neurologic Normal    Sensation Intact to light touch throughout sural, saphenous, superficial peroneal, deep peroneal and medial/lateral plantar nerve distributions.  [place] 5.07 filament (10g) testing  deferred.    Cardiovascular Brisk capillary refill < 2 seconds,intact DP and PT pulses    Special Tests None      Imaging Studies:   No new imaging        James R. Lachman, MD  Foot & Ankle Surgery   Department of Orthopaedic Surgery  [facility]      I personally performed the service.    James R. Lachman, MD    "

## 2024-03-05 NOTE — PATIENT INSTRUCTIONS
[employer], [employer], [employer] are good brands but I recommend going to a dedicate shoe store (not [employer] or [employer].) At these types of stores, they have experts that can fit you for shoes appropriate for your foot problem. Shoe choice is essential to solving/improving most types of foot pain.  Even after a surgery, good shoes are necessary to keep the foot as comfortable as possible.    Go to store with brace and they will help you fit your brace into an appropriate shoe    [employer]  [address] [facility] [address]  [phone]    [facility]  [address] [place] [address] [address], [place] [address]  [phone]    [employer]  [address] [place] [address] [phone]    [employer]   [address] [facility]  [phone]    [employer]  [address], [place], [place] [address] [phone]    [employer]  [address] [place] [address]  [phone]    [facility] [employer] [facility]   [address] [place] [address]  [phone]    [employer]  [address] [facility] [place]  [phone]    [facility] [address], [address] [phone]    [facility] [address], [address], [place] [address] [phone]

## 2024-03-19 ENCOUNTER — TELEPHONE (OUTPATIENT)
Age: 79
End: 2024-03-19

## 2024-03-19 NOTE — TELEPHONE ENCOUNTER
Caller: Patricia     Doctor: Tyson     Reason for call: Patient would like a call back from nurse patient is a little shaken up from fall please advise     Call back#: [phone]

## 2024-03-19 NOTE — TELEPHONE ENCOUNTER
Spoke with patient and assisted in r/s procedure.     Also spoke about her fall today. She stated she went to get up from her chair to use the bathroom and started to fall. Fortunately she landed on her ottoman.   She was able to make her way to the bathroom and back to her chair but is now afraid to get up again incase she falls.     She does have a call into her NP at [facility] for suggestions and to see if someone can come to her home to assist her today.     Her son and granddaughter live above her- the currently are at work and school but should be available later today as well.     Advised patient to f/u with [email or web address] if she does not hear back from them shortly.

## 2024-03-19 NOTE — TELEPHONE ENCOUNTER
Caller: Patricia     Doctor: Tyson     Reason for call: Patient called needs to reschedule procedure due to fall and is afraid of falling again please call back to reschedule     Call back#: [phone]

## 2024-04-12 NOTE — PRE-PROCEDURE INSTRUCTIONS
Pre-Surgery Instructions:   Medication Instructions    Cholecalciferol 125 MCG (5000 UT) capsule Hold this medication for 7 days before surgery      diphenhydrAMINE (Benadryl Allergy) 25 mg tablet Hold day of surgery      fluticasone (Flonase Allergy Relief) 50 mcg/act nasal spray May use day of surgery if needed      hydrochlorothiazide (HYDRODIURIL) 12.5 mg tablet Hold day of surgery      latanoprost (XALATAN) 0.005 % ophthalmic solution Take this medication day of surgery if normally taken in the morning.      lisinopril (ZESTRIL) 10 mg tablet Hold day of surgery      LORazepam (ATIVAN) 1 mg tablet May use day of surgery if needed      methocarbamol (ROBAXIN) 500 mg tablet Hold day of surgery      ondansetron (ZOFRAN) 4 mg tablet Hold day of surgery      sulfamethoxazole-trimethoprim (BACTRIM DS) 800-160 mg per tablet Hold day of surgery      traMADol (ULTRAM) 50 mg tablet Hold day of surgery      venlafaxine (EFFEXOR) 75 mg tablet Take this medication day of surgery if normally taken in the morning.      venlafaxine (EFFEXOR-XR) 150 mg 24 hr capsule Take this medication day of surgery if normally taken in the morning.      Medication instructions for day surgery reviewed. Please use only a sip of water to take your instructed medications. Avoid all over the counter vitamins, supplements and NSAIDS for one week prior to surgery per anesthesia guidelines. Tylenol is ok to take as needed.     You will receive a call one business day prior to surgery with an arrival time and hospital directions. If your surgery is scheduled on a Monday, the hospital will be calling you on the Friday prior to your surgery. If you have not heard from anyone by 8pm, please call the hospital supervisor through the hospital  at [phone]. ([place] [phone] or [place] [phone]).    Do not eat or drink anything after midnight the night before your surgery, including candy, mints, lifesavers, or chewing gum. Do not  drink alcohol 24hrs before your surgery. Try not to smoke at least 24hrs before your surgery.       Follow the pre surgery showering instructions as listed in the “My Surgical Experience Booklet” or otherwise provided by your surgeon's office. Do not use a blade to shave the surgical area 1 week before surgery. It is okay to use a clean electric clippers up to 24 hours before surgery. Do not apply any lotions, creams, including makeup, cologne, deodorant, or perfumes after showering on the day of your surgery. Do not use dry shampoo, hair spray, hair gel, or any type of hair products.     No contact lenses, eye make-up, or artificial eyelashes. Remove nail polish, including gel polish, and any artificial, gel, or acrylic nails if possible. Remove all jewelry including rings and body piercing jewelry.     Wear causal clothing that is easy to take on and off. Consider your type of surgery.    Keep any valuables, jewelry, piercings at home. Please bring any specially ordered equipment (sling, braces) if indicated.    Arrange for a responsible person to drive you to and from the hospital on the day of your surgery. Please confirm the visitor policy for the day of your procedure when you receive your phone call with an arrival time.     Call the surgeon's office with any new illnesses, exposures, or additional questions prior to surgery.    Please reference your “My Surgical Experience Booklet” for additional information to prepare for your upcoming surgery.

## 2024-04-21 ENCOUNTER — ANESTHESIA EVENT (OUTPATIENT)
Dept: PERIOP | Facility: HOSPITAL | Age: 79
End: 2024-04-21
Payer: MEDICARE

## 2024-04-22 ENCOUNTER — HOSPITAL ENCOUNTER (OUTPATIENT)
Facility: HOSPITAL | Age: 79
Setting detail: OUTPATIENT SURGERY
Discharge: HOME | End: 2024-04-22
Attending: DENTIST | Admitting: DENTIST
Payer: MEDICARE

## 2024-04-22 ENCOUNTER — ANESTHESIA (OUTPATIENT)
Dept: PERIOP | Facility: HOSPITAL | Age: 79
End: 2024-04-22
Payer: MEDICARE

## 2024-04-22 VITALS
OXYGEN SATURATION: 100 % | RESPIRATION RATE: 20 BRPM | SYSTOLIC BLOOD PRESSURE: 121 MMHG | DIASTOLIC BLOOD PRESSURE: 55 MMHG | WEIGHT: 231 LBS | TEMPERATURE: 97.3 F | BODY MASS INDEX: 46.57 KG/M2 | HEART RATE: 70 BPM | HEIGHT: 59 IN

## 2024-04-22 DIAGNOSIS — M19.011 PRIMARY OSTEOARTHRITIS OF RIGHT SHOULDER: Primary | ICD-10-CM

## 2024-04-22 PROBLEM — E66.01 MORBID OBESITY WITH BMI OF 45.0-49.9, ADULT (HCC): Status: RESOLVED | Noted: 2023-12-05 | Resolved: 2024-04-22

## 2024-04-22 RX ORDER — PROPOFOL 10 MG/ML
INJECTION, EMULSION INTRAVENOUS AS NEEDED
Status: DISCONTINUED | OUTPATIENT
Start: 2024-04-22 | End: 2024-04-22

## 2024-04-22 RX ORDER — TRAMADOL HYDROCHLORIDE 50 MG/1
50 TABLET ORAL EVERY 6 HOURS PRN
Qty: 30 TABLET | Refills: 0 | Status: SHIPPED | OUTPATIENT
Start: 2024-04-22 | End: 2024-05-02

## 2024-04-22 RX ORDER — ROCURONIUM BROMIDE 10 MG/ML
INJECTION, SOLUTION INTRAVENOUS AS NEEDED
Status: DISCONTINUED | OUTPATIENT
Start: 2024-04-22 | End: 2024-04-22

## 2024-04-22 RX ORDER — CHLORHEXIDINE GLUCONATE ORAL RINSE 1.2 MG/ML
SOLUTION DENTAL AS NEEDED
Status: DISCONTINUED | OUTPATIENT
Start: 2024-04-22 | End: 2024-04-22 | Stop reason: HOSPADM

## 2024-04-22 RX ORDER — LIDOCAINE HYDROCHLORIDE 10 MG/ML
INJECTION, SOLUTION EPIDURAL; INFILTRATION; INTRACAUDAL; PERINEURAL AS NEEDED
Status: DISCONTINUED | OUTPATIENT
Start: 2024-04-22 | End: 2024-04-22

## 2024-04-22 RX ORDER — ONDANSETRON 2 MG/ML
4 INJECTION INTRAMUSCULAR; INTRAVENOUS ONCE AS NEEDED
Status: DISCONTINUED | OUTPATIENT
Start: 2024-04-22 | End: 2024-04-22 | Stop reason: HOSPADM

## 2024-04-22 RX ORDER — SODIUM CHLORIDE, SODIUM LACTATE, POTASSIUM CHLORIDE, CALCIUM CHLORIDE 600; 310; 30; 20 MG/100ML; MG/100ML; MG/100ML; MG/100ML
125 INJECTION, SOLUTION INTRAVENOUS CONTINUOUS
Status: DISCONTINUED | OUTPATIENT
Start: 2024-04-22 | End: 2024-04-22 | Stop reason: HOSPADM

## 2024-04-22 RX ORDER — BUPIVACAINE HYDROCHLORIDE 5 MG/ML
INJECTION, SOLUTION EPIDURAL; INTRACAUDAL AS NEEDED
Status: DISCONTINUED | OUTPATIENT
Start: 2024-04-22 | End: 2024-04-22 | Stop reason: HOSPADM

## 2024-04-22 RX ORDER — EPHEDRINE SULFATE 50 MG/ML
INJECTION INTRAVENOUS AS NEEDED
Status: DISCONTINUED | OUTPATIENT
Start: 2024-04-22 | End: 2024-04-22

## 2024-04-22 RX ORDER — TRAMADOL HYDROCHLORIDE 50 MG/1
50 TABLET ORAL ONCE
Status: COMPLETED | OUTPATIENT
Start: 2024-04-22 | End: 2024-04-22

## 2024-04-22 RX ORDER — PHENYLEPHRINE HCL IN 0.9% NACL 1 MG/10 ML
SYRINGE (ML) INTRAVENOUS AS NEEDED
Status: DISCONTINUED | OUTPATIENT
Start: 2024-04-22 | End: 2024-04-22

## 2024-04-22 RX ORDER — FENTANYL CITRATE 50 UG/ML
INJECTION, SOLUTION INTRAMUSCULAR; INTRAVENOUS AS NEEDED
Status: DISCONTINUED | OUTPATIENT
Start: 2024-04-22 | End: 2024-04-22

## 2024-04-22 RX ORDER — ONDANSETRON 2 MG/ML
INJECTION INTRAMUSCULAR; INTRAVENOUS AS NEEDED
Status: DISCONTINUED | OUTPATIENT
Start: 2024-04-22 | End: 2024-04-22

## 2024-04-22 RX ORDER — LIDOCAINE HYDROCHLORIDE AND EPINEPHRINE 10; 10 MG/ML; UG/ML
INJECTION, SOLUTION INFILTRATION; PERINEURAL AS NEEDED
Status: DISCONTINUED | OUTPATIENT
Start: 2024-04-22 | End: 2024-04-22 | Stop reason: HOSPADM

## 2024-04-22 RX ADMIN — TRAMADOL HYDROCHLORIDE 50 MG: 50 TABLET, COATED ORAL at 15:19

## 2024-04-22 RX ADMIN — SODIUM CHLORIDE, SODIUM LACTATE, POTASSIUM CHLORIDE, AND CALCIUM CHLORIDE 125 ML/HR: .6; .31; .03; .02 INJECTION, SOLUTION INTRAVENOUS at 12:11

## 2024-04-22 RX ADMIN — EPHEDRINE SULFATE 10 MG: 50 INJECTION, SOLUTION INTRAVENOUS at 13:13

## 2024-04-22 RX ADMIN — Medication 100 MCG: at 13:16

## 2024-04-22 RX ADMIN — PROPOFOL 150 MG: 10 INJECTION, EMULSION INTRAVENOUS at 13:04

## 2024-04-22 RX ADMIN — FENTANYL CITRATE 50 MCG: 50 INJECTION INTRAMUSCULAR; INTRAVENOUS at 13:04

## 2024-04-22 RX ADMIN — ROCURONIUM BROMIDE 50 MG: 10 INJECTION, SOLUTION INTRAVENOUS at 13:05

## 2024-04-22 RX ADMIN — LIDOCAINE HYDROCHLORIDE 50 MG: 10 INJECTION, SOLUTION EPIDURAL; INFILTRATION; INTRACAUDAL; PERINEURAL at 13:04

## 2024-04-22 RX ADMIN — ONDANSETRON 4 MG: 2 INJECTION INTRAMUSCULAR; INTRAVENOUS at 13:11

## 2024-04-22 RX ADMIN — EPHEDRINE SULFATE 10 MG: 50 INJECTION, SOLUTION INTRAVENOUS at 13:16

## 2024-04-22 RX ADMIN — FENTANYL CITRATE 50 MCG: 50 INJECTION INTRAMUSCULAR; INTRAVENOUS at 13:17

## 2024-04-22 NOTE — OP NOTE
OPERATIVE REPORT  PATIENT NAME: Patricia Mariscal    :  1945  MRN: [id number]  Pt Location:  OR ROOM 07    SURGERY DATE: 2024    Surgeons and Role:     * Livan Trotter DMD - Primary    Preop Diagnosis:  Dental caries, unspecified [K02.9]    Post-Op Diagnosis Codes:     * Dental caries, unspecified [K02.9]    Procedure(s):  EXTRACTION TEETH MULTIPLE -2.3.7.10. 14.19.29.30    Specimen(s):  * No specimens in log *    Estimated Blood Loss:   Minimal    Drains:  * No LDAs found *    Anesthesia Type:   General    Operative Indications:  Dental caries, unspecified [K02.9]      Operative Findings:  Failing dentition secondary to caries    Complications:   None    Procedure and Technique:  The patient was greeted in the preoperative area. All the risks and benefits of the procedure were once again explained and the risks of sinus communication as well as lower chin and lip numbness were explained in detail all questions were answered. Consent had already been signed. Care was then handed back to the anesthesia team.    The patient was brought into the operating room by the anesthesia team and the patient was placed in a supine position where the patient remained for the rest of the case. Anesthesia was able to establish an orotracheal intubation without any complications. Care was then handed back to the OMFS team.    Patient was draped in sterile manner timeout was performed in which the patient was correctly identified by name medical record number as well as a site of the procedure be performed.       Once a timeout was completed oral cavity was thoroughly suctioned with the Yankauer suction the moist  packing was used it as a throat pack. Patient was given local anesthesia.  The dosages are in the OR record and can be reviewed should this be required.    A periosteal elevator was used to separate the gingiva from the teeth. Full thickness mucoperiosteal flaps elevated at all extraction sites.  Bone was  removed around the teeth.  The teeth were sectioned.  Teeth #2.3.7.10. [id number] were then extracted without complication.    Surgical sites were thoroughly curetted, bone filed, and irrigated with sterile saline. Closure with 3-0 chromic gut sutures. Next the oral cavity was thoroughly irrigated with sterile saline and suctioned with the [employer] suction. The moist throat packing was removed and the oropharynx was suctioned.     I was present for the entire procedure.    Patient Disposition:  PACU , hemodynamically stable, and extubated and stable        SIGNATURE: Livan Trotter DMD  DATE: April 22, 2024  TIME: 1:43 PM

## 2024-04-22 NOTE — INTERVAL H&P NOTE
H&P reviewed. After examining the patient I find no changes in the patients condition since the H&P had been written.    Vitals:    04/22/24 1115   BP: 159/67   Pulse: 70   Resp: 18   Temp: 98 °F (36.7 °C)   SpO2: 100%

## 2024-04-22 NOTE — ANESTHESIA PREPROCEDURE EVALUATION
Procedure:  EXTRACTION TEETH MULTIPLE -2,3,7,10, 14,19,29,30 (Mouth)    Relevant Problems   MUSCULOSKELETAL   (+) Inflammation of sacroiliac joint (HCC)   (+) Lumbar spondylosis   (+) Post-traumatic arthritis of left ankle   (+) Primary osteoarthritis of right shoulder      Other   (+) Morbid obesity with BMI of 45.0-49.9, adult (HCC)    Multiple allergies. Patient has had fentanyl only in the patch form in the past and she is willing to try it through the IV for pain control.    Physical Exam    Airway    Mallampati score: II  TM Distance: >3 FB  Neck ROM: full     Dental        Cardiovascular  Cardiovascular exam normal    Pulmonary  Pulmonary exam normal     Other Findings  post-pubertal.      Anesthesia Plan  ASA Score- 3     Anesthesia Type- general with ASA Monitors.         Additional Monitors:     Airway Plan:            Plan Factors-Exercise tolerance (METS): >4 METS.    Chart reviewed. EKG reviewed.  Existing labs reviewed. Patient summary reviewed.    Patient is not a current smoker.  Patient did not smoke on day of surgery.    Obstructive sleep apnea risk education given perioperatively.        Induction- intravenous.    Postoperative Plan- Plan for postoperative opioid use. Planned trial extubation    Informed Consent- Anesthetic plan and risks discussed with patient.  I personally reviewed this patient with the CRNA. Discussed and agreed on the Anesthesia Plan with the CRNA..

## 2024-04-22 NOTE — DISCHARGE INSTR - AVS FIRST PAGE
POST OPERATIVE INSTRUCTIONS FOLLOWING ORAL SURGERY    Swelling: To reduce swelling, place ice bag on your face up to 12 hours following surgery. This is an important factor in keeping swelling to a minimum. Swelling is common and need not cause alarm.    Rinsing: DO NOT RINSE for the first 12 hours after surgery. After 24 hours it is important to rinse, using warm salt water (not over the counter mouthwash) 3 to 4 times a day, particularly after eating. Brush areas of mouth not affected by the surgery starting tomorrow.    Spitting: DO NOT SPIT OUT frequent spitting will cause bleeding to continue.  Exercise Jaw: In some cases following oral surgery, it becomes difficult to open your mouth. Exercise your jaw frequently by attempting to open your mouth wide. You may experience discomfort at first, however, with continued exercise the discomfort is reduced.    Diet: After having oral surgery it is recommended the patient maintain a semi-liquid diet for 24 hours. A regular diet should be resumed as soon as possible, avoiding peanuts, pretzels, and foods with seeds.    Vomiting: Occasionally, patients will have nausea after surgery. Tea, ginger ale, and soup broth will help this complication.    Pain: A prescription for pain relieving drugs is given when surgery is extensive. For lesser surgical procedures, it is recommended the patient use Motrin or Advil. If you are in pain and the drug you are taking does not help, please contact us and we will try to remedy the situation.    Bleeding: Bite on gauze for 30 minutes. If the bleeding continues, bite on new gauze for an additional 30 minutes. If bleeding continues, place a damp tea bag over the socket and continue to bite down for an additional 30 minutes. Frequent gauze changes allow bleeding to continue.    Smoking: It is important you DO NOT SMOKE after surgery. Smoking caused dry socket, which is very painful.    Concerns: May call [facility] at [phone].    Emergency: Go to the EMERGENCY ROOM at [facility] and ask for the Oral Surgeon on call. DO NOT WAIT until your post-operative appointment to consult us. [facility] [phone].

## 2024-04-22 NOTE — ANESTHESIA POSTPROCEDURE EVALUATION
Post-Op Assessment Note    CV Status:  Stable  Pain Score: 0    Pain management: adequate       Mental Status:  Alert and awake   Hydration Status:  Euvolemic   PONV Controlled:  Controlled   Airway Patency:  Patent     Post Op Vitals Reviewed: Yes    No anethesia notable event occurred.    Staff: CRNA               BP   134/57   Temp   97.4   Pulse 70   Resp 16   SpO2 100

## 2024-04-24 ENCOUNTER — HOSPITAL ENCOUNTER (OUTPATIENT)
Dept: RADIOLOGY | Facility: CLINIC | Age: 79
Discharge: HOME | End: 2024-04-24
Payer: MEDICARE

## 2024-04-24 VITALS
HEART RATE: 69 BPM | SYSTOLIC BLOOD PRESSURE: 138 MMHG | DIASTOLIC BLOOD PRESSURE: 76 MMHG | RESPIRATION RATE: 18 BRPM | TEMPERATURE: 98.2 F | OXYGEN SATURATION: 100 %

## 2024-04-24 DIAGNOSIS — M54.12 CERVICAL RADICULOPATHY: ICD-10-CM

## 2024-04-24 PROCEDURE — 62321 NJX INTERLAMINAR CRV/THRC: CPT | Performed by: ANESTHESIOLOGY

## 2024-04-24 RX ORDER — PAPAVERINE HCL 150 MG
10 CAPSULE, EXTENDED RELEASE ORAL ONCE
Status: COMPLETED | OUTPATIENT
Start: 2024-04-24 | End: 2024-04-24

## 2024-04-24 RX ADMIN — DEXAMETHASONE SODIUM PHOSPHATE 10 MG: 10 INJECTION, SOLUTION INTRAMUSCULAR; INTRAVENOUS at 11:09

## 2024-04-24 NOTE — DISCHARGE INSTR - LAB
Epidural Steroid Injection   WHAT YOU NEED TO KNOW:   An epidural steroid injection (ALONSO) is a procedure to inject steroid medicine into the epidural space. The epidural space is between your spinal cord and vertebrae. Steroids reduce inflammation and fluid buildup in your spine that may be causing pain. You may be given pain medicine along with the steroids.          ACTIVITY  Do not drive or operate machinery today.  No strenuous activity today - bending, lifting, etc.  You may resume normal activites starting tomorrow - start slowly and as tolerated.  You may shower today, but no tub baths or hot tubs.  You may have numbness for several hours from the local anesthetic. Please use caution and common sense, especially with weight-bearing activities.    CARE OF THE INJECTION SITE  If you have soreness or pain, apply ice to the area today (20 minutes on/20 minutes off).  Starting tomorrow, you may use warm, moist heat or ice if needed.  You may have an increase or change in your discomfort for 36-48 hours after your treatment.  Apply ice and continue with any pain medication you have been prescribed.  Notify the [facility] if you have any of the following: redness, drainage, swelling, headache, stiff neck or fever above 100°F.    SPECIAL INSTRUCTIONS  Our office will contact you in approximately 7 days for a progress report.    MEDICATIONS  Continue to take all routine medications.  Our office may have instructed you to hold some medications.    As no general anesthesia was used in today's procedure, you should not experience any side effects related to anesthesia.     If you are diabetic, the steroids used in today's injection may temporarily increase your blood sugar levels after the first few days after your injection. Please keep a close eye on your sugars and alert the doctor who manages your diabetes if your sugars are significantly high from your baseline or you are symptomatic.     If you have a  problem specifically related to your procedure, please call our office at [phone].  Problems not related to your procedure should be directed to your primary care physician.

## 2024-04-24 NOTE — H&P
History of Present Illness: The patient is a 79 y.o. female who presents with complaints of neck and arm pain.    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    Depression     History of transfusion     Hypertension     MRSA (methicillin resistant Staphylococcus aureus)     Pneumonia        Past Surgical History:   Procedure Laterality Date    APPENDECTOMY       SECTION      CHOLECYSTECTOMY      COLON SURGERY      DILATION AND CURETTAGE OF UTERUS      EXTRACTION, ERUPTED TOOTH REQUIRING REMOVAL OF BONE AND/OR SECTIONING OF TOOTH, AND INCLUDING ELEVATION OF MUCOPERIOSTEAL FLAP IF INDICATED N/A 2024    Procedure: EXTRACTION TEETH MULTIPLE -2,3,7,10, 14,19,29,30;  Surgeon: Livan Trotter DMD;  Location: [facility];  Service: Maxillofacial    EYE SURGERY      GASTRIC BYPASS      HERNIA REPAIR      several    HYSTERECTOMY      JOINT REPLACEMENT           Current Outpatient Medications:     Cholecalciferol 125 MCG (5000 UT) capsule, Take 10,000 Units by mouth every 30 (thirty) days, Disp: , Rfl:     diphenhydrAMINE (Benadryl Allergy) 25 mg tablet, Take by mouth if needed, Disp: , Rfl:     fluticasone (Flonase Allergy Relief) 50 mcg/act nasal spray, into each nostril, Disp: , Rfl:     hydrochlorothiazide (HYDRODIURIL) 12.5 mg tablet, Take 12.5 mg by mouth daily, Disp: , Rfl:     latanoprost (XALATAN) 0.005 % ophthalmic solution, Apply 1 drop to eye, Disp: , Rfl:     lisinopril (ZESTRIL) 10 mg tablet, Take 10 mg by mouth daily, Disp: , Rfl:     LORazepam (ATIVAN) 1 mg tablet, Take 1 mg by mouth 3 (three) times a day as needed, Disp: , Rfl:     methocarbamol (ROBAXIN) 500 mg tablet, Take 500 mg by mouth 2 (two) times a day as needed, Disp: , Rfl:     ondansetron (ZOFRAN) 4 mg tablet, Take 1 tablet (4 mg total) by mouth every 6 (six) hours as needed for nausea or vomiting, Disp: 12  tablet, Rfl: 0    sulfamethoxazole-trimethoprim (BACTRIM DS) 800-160 mg per tablet, 1 tablet every 12 (twelve) hours, Disp: , Rfl:     traMADol (ULTRAM) 50 mg tablet, Take 50 mg by mouth every 6 (six) hours as needed, Disp: , Rfl:     traMADol (Ultram) 50 mg tablet, Take 1 tablet (50 mg total) by mouth every 6 (six) hours as needed for moderate pain for up to 10 days, Disp: 30 tablet, Rfl: 0    venlafaxine (EFFEXOR) 75 mg tablet, Take 75 mg by mouth, Disp: , Rfl:     venlafaxine (EFFEXOR-XR) 150 mg 24 hr capsule, Take 150 mg by mouth daily, Disp: , Rfl:     Allergies   Allergen Reactions    Alprazolam Other (See Comments) and Hives     ineffective  ineffective      Azithromycin Hives    Cephalexin Hives    Codeine Hyperactivity, Other (See Comments) and Hives     Hyperactivity and agitation  Hyperactivity and agitation      Diclofenac GI Intolerance    Diclofenac Sodium Itching    Duloxetine Other (See Comments) and Hives     Dizziness and confusion  Dizziness and confusion      Duloxetine Hcl Dizziness and Other (See Comments)    Erythromycin Hives    Fentanyl Itching    Hydrochlorothiazide Other (See Comments)     Other reaction(s): hypotensive    Hydromorphone Hives    Ibuprofen Other (See Comments) and GI Intolerance     Stomach pains  Stomach pains      Iodinated Contrast Media Hives    Iodine - Food Allergy Itching     CT dye    Latex Itching    Medical Tape Itching and Other (See Comments)      Paper tape - skin tears off      Meloxicam GI Intolerance    Methadone Other (See Comments) and Hives     agitation  agitation      Methylprednisolone Other (See Comments)     Raises BP  Raises blood pressure      Milnacipran Itching    Mirtazapine Other (See Comments) and Hives     hallucinations  hallucinations      Molds & Smuts Sneezing    Morphine Other (See Comments)     Agitation  Agitation if on for more than a few days  Agitation if on for more than a few days      Naloxone Other (See Comments)    Naproxen  Other (See Comments) and GI Bleeding     Stomach pains  Stomach pain      Nefazodone Hives    Nitrofurantoin Hives    Oxycodone-Acetaminophen Itching and Hives     Agitation  Itchy and agitation      Pentazocine Other (See Comments)     Other reaction(s): did not help with pain    Pollen Extract Sneezing    Prednisone Other (See Comments)     hypertension  Tolerates steroidal inhalers  Tolerates steroidal inhalers      Pregabalin Other (See Comments)     Pt stated she is unsure why she can't take, just knows she cant take the med.  agitation      Prochlorperazine Other (See Comments)     Extreme agitation  Extreme agitation      Rofecoxib Other (See Comments)     Other reaction(s): ineffective    Sertraline Other (See Comments)     agitation  agitation      Tapentadol GI Intolerance    Amiloride Rash     Other reaction(s): hypotensive    Celecoxib Rash     Pt stated she is unsure why she can't take, just knows she cant take the med.      Clonazepam Rash    Penicillins Hives and Rash       Physical Exam:   Vitals:    04/24/24 1044   BP: 140/76   Pulse: 60   Resp: 18   Temp: 98.2 °F (36.8 °C)   SpO2: 95%     General: Awake, Alert, Oriented x 3, Mood and affect appropriate  Respiratory: Respirations even and unlabored  Cardiovascular: Peripheral pulses intact; no edema  Musculoskeletal Exam: antalgic gait    ASA Score: 3    Patient/Chart Verification  Patient ID Verified: Verbal  ID Band Applied: No  Consents Confirmed: Procedural  H&P( within 30 days) Verified: To be obtained in the Pre-Procedure area  Allergies Reviewed: Yes  Anticoag/NSAID held?: No (denies nsaids)  Currently on antibiotics?: No    Assessment:   1. Cervical radiculopathy        Plan: C6-7 cervical epidural steroid

## 2024-05-01 ENCOUNTER — TELEPHONE (OUTPATIENT)
Dept: PAIN MEDICINE | Facility: CLINIC | Age: 79
End: 2024-05-01

## 2024-05-23 ENCOUNTER — TELEPHONE (OUTPATIENT)
Age: 79
End: 2024-05-23

## 2024-05-23 NOTE — TELEPHONE ENCOUNTER
Caller: Patient     Doctor: Lachman    Reason for call:     Patient is calling about special shoes for her feet ([place] boot), she has trouble getting the boots, taking too long, when she got the boots they fit appropriately.  She had to go back to have shoe fitted, but took long (5 weeks later) She went to [facility] for fitting and did not feet and rubbed her ankle raw, walking only a few feet and hurt her feet.  She was suppose to wear the boot for a month, but she did not want to accept the shoes / boots.  She is waiting to be refitted on Wednesday.  But she is quite upset about this whole process, not happy about the process and this is why she did not call back for an appointment.  (She purchased a pair of supportive shoes to help for a little.  She is asking for a call back relating this.    Call back#: [phone]

## 2024-05-24 ENCOUNTER — HOSPITAL ENCOUNTER (OUTPATIENT)
Facility: HOSPITAL | Age: 79
Setting detail: OBSERVATION
Discharge: HOME | End: 2024-05-26
Attending: EMERGENCY MEDICINE | Admitting: SURGERY
Payer: MEDICARE

## 2024-05-24 ENCOUNTER — APPOINTMENT (EMERGENCY)
Dept: RADIOLOGY | Facility: HOSPITAL | Age: 79
End: 2024-05-24
Payer: MEDICARE

## 2024-05-24 ENCOUNTER — TELEPHONE (OUTPATIENT)
Dept: OBGYN CLINIC | Facility: CLINIC | Age: 79
End: 2024-05-24

## 2024-05-24 DIAGNOSIS — K56.609 SMALL BOWEL OBSTRUCTION (HCC): Primary | ICD-10-CM

## 2024-05-24 LAB
ANION GAP SERPL CALCULATED.3IONS-SCNC: 8 MMOL/L (ref 4–13)
BASOPHILS # BLD AUTO: 0.03 THOUSANDS/ÂΜL (ref 0–0.1)
BASOPHILS NFR BLD AUTO: 0 % (ref 0–1)
BUN SERPL-MCNC: 30 MG/DL (ref 5–25)
CALCIUM SERPL-MCNC: 9.4 MG/DL (ref 8.4–10.2)
CARDIAC TROPONIN I PNL SERPL HS: 6 NG/L
CHLORIDE SERPL-SCNC: 106 MMOL/L (ref 96–108)
CO2 SERPL-SCNC: 21 MMOL/L (ref 21–32)
CREAT SERPL-MCNC: 1.07 MG/DL (ref 0.6–1.3)
EOSINOPHIL # BLD AUTO: 0.06 THOUSAND/ÂΜL (ref 0–0.61)
EOSINOPHIL NFR BLD AUTO: 1 % (ref 0–6)
ERYTHROCYTE [DISTWIDTH] IN BLOOD BY AUTOMATED COUNT: 13.1 % (ref 11.6–15.1)
GFR SERPL CREATININE-BSD FRML MDRD: 49 ML/MIN/1.73SQ M
GLUCOSE SERPL-MCNC: 109 MG/DL (ref 65–140)
HCT VFR BLD AUTO: 33.8 % (ref 34.8–46.1)
HGB BLD-MCNC: 10.9 G/DL (ref 11.5–15.4)
IMM GRANULOCYTES # BLD AUTO: 0.03 THOUSAND/UL (ref 0–0.2)
IMM GRANULOCYTES NFR BLD AUTO: 0 % (ref 0–2)
LIPASE SERPL-CCNC: 37 U/L (ref 11–82)
LYMPHOCYTES # BLD AUTO: 0.98 THOUSANDS/ÂΜL (ref 0.6–4.47)
LYMPHOCYTES NFR BLD AUTO: 12 % (ref 14–44)
MCH RBC QN AUTO: 30.8 PG (ref 26.8–34.3)
MCHC RBC AUTO-ENTMCNC: 32.2 G/DL (ref 31.4–37.4)
MCV RBC AUTO: 96 FL (ref 82–98)
MONOCYTES # BLD AUTO: 0.35 THOUSAND/ÂΜL (ref 0.17–1.22)
MONOCYTES NFR BLD AUTO: 4 % (ref 4–12)
NEUTROPHILS # BLD AUTO: 6.84 THOUSANDS/ÂΜL (ref 1.85–7.62)
NEUTS SEG NFR BLD AUTO: 83 % (ref 43–75)
NRBC BLD AUTO-RTO: 0 /100 WBCS
PLATELET # BLD AUTO: 253 THOUSANDS/UL (ref 149–390)
PMV BLD AUTO: 9.2 FL (ref 8.9–12.7)
POTASSIUM SERPL-SCNC: 5.1 MMOL/L (ref 3.5–5.3)
RBC # BLD AUTO: 3.54 MILLION/UL (ref 3.81–5.12)
SODIUM SERPL-SCNC: 135 MMOL/L (ref 135–147)
WBC # BLD AUTO: 8.29 THOUSAND/UL (ref 4.31–10.16)

## 2024-05-24 PROCEDURE — 84484 ASSAY OF TROPONIN QUANT: CPT

## 2024-05-24 PROCEDURE — 74176 CT ABD & PELVIS W/O CONTRAST: CPT

## 2024-05-24 PROCEDURE — 36415 COLL VENOUS BLD VENIPUNCTURE: CPT

## 2024-05-24 PROCEDURE — 99285 EMERGENCY DEPT VISIT HI MDM: CPT | Performed by: EMERGENCY MEDICINE

## 2024-05-24 PROCEDURE — 99284 EMERGENCY DEPT VISIT MOD MDM: CPT

## 2024-05-24 PROCEDURE — 80048 BASIC METABOLIC PNL TOTAL CA: CPT

## 2024-05-24 PROCEDURE — 93005 ELECTROCARDIOGRAM TRACING: CPT

## 2024-05-24 PROCEDURE — 83690 ASSAY OF LIPASE: CPT

## 2024-05-24 PROCEDURE — 85025 COMPLETE CBC W/AUTO DIFF WBC: CPT

## 2024-05-24 RX ORDER — DICYCLOMINE HCL 20 MG
20 TABLET ORAL ONCE
Status: COMPLETED | OUTPATIENT
Start: 2024-05-24 | End: 2024-05-24

## 2024-05-24 RX ORDER — MAGNESIUM HYDROXIDE/ALUMINUM HYDROXICE/SIMETHICONE 120; 1200; 1200 MG/30ML; MG/30ML; MG/30ML
30 SUSPENSION ORAL ONCE
Status: COMPLETED | OUTPATIENT
Start: 2024-05-24 | End: 2024-05-24

## 2024-05-24 RX ADMIN — DICYCLOMINE HYDROCHLORIDE 20 MG: 20 TABLET ORAL at 21:43

## 2024-05-24 RX ADMIN — ALUMINUM HYDROXIDE, MAGNESIUM HYDROXIDE, DIMETHICONE 30 ML: 200; 200; 20 LIQUID ORAL at 21:43

## 2024-05-24 NOTE — TELEPHONE ENCOUNTER
Patient was called to further discuss questions regarding [place] brace and shoe stores. All questions answered. Patent notes she has an appointment with [facility] next week to get her brace adjusted. Patient advised to go to [facility] with her [place] brace to be fitted for shoes to accommodate brace. Advised patient to schedule f/u with Dr. Lachman once she has been in her [place] brace full time for at least 3 weeks. Patient expressed understanding, no further questions.

## 2024-05-24 NOTE — TELEPHONE ENCOUNTER
Received transfer call from pt and relayed Thiago's msg to her.  She states that she is having problems w/ fitting her for shoe that fit.  She has been dealing w/them since March.  They  have made them too wide and have rubbed her the wrong way.  She went on [employer] and ordered orthopedic shoes to fit her feet size 8 and the [place] brace does not fit in them.  States she can only go to [facility] as [facility] only deals w/ so she cannot go to one of these other shoe stores. She put some type of ortho lift in the shoe which is helping and is using an ankle brace. Has an appt w/ again next Tuesday again. She thinks the boot is poorly made and creases and she's not sure it is suppose to be this way.  The shoes are not comfortable and safe. She has been stumbling and had fallen 4 times due to her back issue as well. These are not. Pt needs some assistance w/this matter.

## 2024-05-24 NOTE — TELEPHONE ENCOUNTER
Called and spoke w/pt and relayed MARIE Jaramillo's msg to pt.  She will call call the following listed below.   Her concern is that one foot is larger than the other. That is why she was dealing w/[facility].     [address], [place] [address] [phone]

## 2024-05-25 ENCOUNTER — APPOINTMENT (EMERGENCY)
Dept: RADIOLOGY | Facility: HOSPITAL | Age: 79
End: 2024-05-25
Payer: MEDICARE

## 2024-05-25 ENCOUNTER — APPOINTMENT (EMERGENCY)
Dept: RADIOLOGY | Facility: HOSPITAL | Age: 79
End: 2024-05-25
Attending: EMERGENCY MEDICINE
Payer: MEDICARE

## 2024-05-25 LAB
2HR DELTA HS TROPONIN: 1 NG/L
4HR DELTA HS TROPONIN: 1 NG/L
AMORPH URATE CRY URNS QL MICRO: ABNORMAL
ATRIAL RATE: 66 BPM
BACTERIA UR QL AUTO: ABNORMAL /HPF
BILIRUB UR QL STRIP: NEGATIVE
CARDIAC TROPONIN I PNL SERPL HS: 7 NG/L
CARDIAC TROPONIN I PNL SERPL HS: 7 NG/L
CLARITY UR: CLEAR
COLOR UR: YELLOW
GLUCOSE UR STRIP-MCNC: NEGATIVE MG/DL
HGB UR QL STRIP.AUTO: NEGATIVE
HYALINE CASTS #/AREA URNS LPF: ABNORMAL /LPF
KETONES UR STRIP-MCNC: NEGATIVE MG/DL
LACTATE SERPL-SCNC: 1.2 MMOL/L (ref 0.5–2)
LEUKOCYTE ESTERASE UR QL STRIP: NEGATIVE
MUCOUS THREADS UR QL AUTO: ABNORMAL
NITRITE UR QL STRIP: POSITIVE
NON-SQ EPI CELLS URNS QL MICRO: ABNORMAL /HPF
P AXIS: 40 DEGREES
PH UR STRIP.AUTO: 5.5 [PH]
PR INTERVAL: 200 MS
PROT UR STRIP-MCNC: NEGATIVE MG/DL
QRS AXIS: -6 DEGREES
QRSD INTERVAL: 86 MS
QT INTERVAL: 390 MS
QTC INTERVAL: 408 MS
RBC #/AREA URNS AUTO: ABNORMAL /HPF
SP GR UR STRIP.AUTO: 1.02 (ref 1–1.03)
T WAVE AXIS: 33 DEGREES
URATE CRY URNS QL MICRO: ABNORMAL /HPF
UROBILINOGEN UR STRIP-ACNC: <2 MG/DL
VENTRICULAR RATE: 66 BPM
WBC #/AREA URNS AUTO: ABNORMAL /HPF

## 2024-05-25 PROCEDURE — 96374 THER/PROPH/DIAG INJ IV PUSH: CPT

## 2024-05-25 PROCEDURE — 81001 URINALYSIS AUTO W/SCOPE: CPT

## 2024-05-25 PROCEDURE — 74176 CT ABD & PELVIS W/O CONTRAST: CPT

## 2024-05-25 PROCEDURE — 83605 ASSAY OF LACTIC ACID: CPT

## 2024-05-25 PROCEDURE — 99222 1ST HOSP IP/OBS MODERATE 55: CPT | Performed by: SURGERY

## 2024-05-25 PROCEDURE — 36415 COLL VENOUS BLD VENIPUNCTURE: CPT

## 2024-05-25 PROCEDURE — 93010 ELECTROCARDIOGRAM REPORT: CPT | Performed by: INTERNAL MEDICINE

## 2024-05-25 PROCEDURE — 84484 ASSAY OF TROPONIN QUANT: CPT

## 2024-05-25 RX ORDER — SODIUM CHLORIDE, SODIUM GLUCONATE, SODIUM ACETATE, POTASSIUM CHLORIDE, MAGNESIUM CHLORIDE, SODIUM PHOSPHATE, DIBASIC, AND POTASSIUM PHOSPHATE .53; .5; .37; .037; .03; .012; .00082 G/100ML; G/100ML; G/100ML; G/100ML; G/100ML; G/100ML; G/100ML
100 INJECTION, SOLUTION INTRAVENOUS CONTINUOUS
Status: DISCONTINUED | OUTPATIENT
Start: 2024-05-25 | End: 2024-05-26

## 2024-05-25 RX ORDER — VENLAFAXINE HYDROCHLORIDE 150 MG/1
150 CAPSULE, EXTENDED RELEASE ORAL
Status: DISCONTINUED | OUTPATIENT
Start: 2024-05-25 | End: 2024-05-26 | Stop reason: HOSPADM

## 2024-05-25 RX ORDER — LORAZEPAM 1 MG/1
1 TABLET ORAL 3 TIMES DAILY PRN
Status: DISCONTINUED | OUTPATIENT
Start: 2024-05-25 | End: 2024-05-26 | Stop reason: HOSPADM

## 2024-05-25 RX ORDER — SULFAMETHOXAZOLE AND TRIMETHOPRIM 800; 160 MG/1; MG/1
1 TABLET ORAL EVERY 12 HOURS SCHEDULED
Status: DISCONTINUED | OUTPATIENT
Start: 2024-05-25 | End: 2024-05-26 | Stop reason: HOSPADM

## 2024-05-25 RX ORDER — LATANOPROST 50 UG/ML
1 SOLUTION/ DROPS OPHTHALMIC
Status: DISCONTINUED | OUTPATIENT
Start: 2024-05-25 | End: 2024-05-26 | Stop reason: HOSPADM

## 2024-05-25 RX ORDER — DIPHENHYDRAMINE HYDROCHLORIDE 50 MG/ML
25 INJECTION INTRAMUSCULAR; INTRAVENOUS ONCE
Status: COMPLETED | OUTPATIENT
Start: 2024-05-25 | End: 2024-05-25

## 2024-05-25 RX ORDER — ONDANSETRON 2 MG/ML
4 INJECTION INTRAMUSCULAR; INTRAVENOUS EVERY 4 HOURS PRN
Status: DISCONTINUED | OUTPATIENT
Start: 2024-05-25 | End: 2024-05-26 | Stop reason: HOSPADM

## 2024-05-25 RX ORDER — METHOCARBAMOL 500 MG/1
500 TABLET, FILM COATED ORAL 2 TIMES DAILY PRN
Status: DISCONTINUED | OUTPATIENT
Start: 2024-05-25 | End: 2024-05-26 | Stop reason: HOSPADM

## 2024-05-25 RX ORDER — HEPARIN SODIUM 5000 [USP'U]/ML
5000 INJECTION, SOLUTION INTRAVENOUS; SUBCUTANEOUS EVERY 8 HOURS SCHEDULED
Status: DISCONTINUED | OUTPATIENT
Start: 2024-05-25 | End: 2024-05-26 | Stop reason: HOSPADM

## 2024-05-25 RX ORDER — VENLAFAXINE 37.5 MG/1
75 TABLET ORAL DAILY
Status: DISCONTINUED | OUTPATIENT
Start: 2024-05-26 | End: 2024-05-26 | Stop reason: HOSPADM

## 2024-05-25 RX ADMIN — HEPARIN SODIUM 5000 UNITS: 5000 INJECTION INTRAVENOUS; SUBCUTANEOUS at 14:14

## 2024-05-25 RX ADMIN — SULFAMETHOXAZOLE AND TRIMETHOPRIM 1 TABLET: 800; 160 TABLET ORAL at 22:44

## 2024-05-25 RX ADMIN — VENLAFAXINE HYDROCHLORIDE 150 MG: 150 CAPSULE, EXTENDED RELEASE ORAL at 23:36

## 2024-05-25 RX ADMIN — DIPHENHYDRAMINE HYDROCHLORIDE 25 MG: 50 INJECTION, SOLUTION INTRAMUSCULAR; INTRAVENOUS at 04:03

## 2024-05-25 RX ADMIN — HEPARIN SODIUM 5000 UNITS: 5000 INJECTION INTRAVENOUS; SUBCUTANEOUS at 22:44

## 2024-05-25 RX ADMIN — SULFAMETHOXAZOLE AND TRIMETHOPRIM 1 TABLET: 800; 160 TABLET ORAL at 08:33

## 2024-05-25 RX ADMIN — SODIUM CHLORIDE, SODIUM GLUCONATE, SODIUM ACETATE, POTASSIUM CHLORIDE, MAGNESIUM CHLORIDE, SODIUM PHOSPHATE, DIBASIC, AND POTASSIUM PHOSPHATE 100 ML/HR: .53; .5; .37; .037; .03; .012; .00082 INJECTION, SOLUTION INTRAVENOUS at 08:33

## 2024-05-25 RX ADMIN — SODIUM CHLORIDE, SODIUM GLUCONATE, SODIUM ACETATE, POTASSIUM CHLORIDE, MAGNESIUM CHLORIDE, SODIUM PHOSPHATE, DIBASIC, AND POTASSIUM PHOSPHATE 100 ML/HR: .53; .5; .37; .037; .03; .012; .00082 INJECTION, SOLUTION INTRAVENOUS at 22:56

## 2024-05-25 RX ADMIN — LORAZEPAM 1 MG: 1 TABLET ORAL at 22:44

## 2024-05-25 RX ADMIN — HEPARIN SODIUM 5000 UNITS: 5000 INJECTION INTRAVENOUS; SUBCUTANEOUS at 08:33

## 2024-05-25 RX ADMIN — LATANOPROST 1 DROP: 50 SOLUTION OPHTHALMIC at 23:36

## 2024-05-25 NOTE — H&P
H&P Exam - General Surgery   Patricia Mariscal 79 y.o. female MRN: [id number]  Unit/Bed#: Z5HB Encounter: [id number]    Assessment & Plan     Assessment:  80 yo female with prior Freya-en-Y gastric bypass presents with pSBO vs ileus  UTI (POA)    Plan:  Admit to general surgery as observation  CLD as tolerated  Isolyte 100  Start Bactrim for UTI  Monitor abdominal exam  If nausea/vomiting, will decrease diet and consider NGT  DVT ppx: SQH  OOB/ambulation    History of Present Illness   HPI:  Patricia Mariscal is a 79 y.o. female with a past medical history of HTN, freya-en-y gastric bypass, appendectomy and cholecystectomy who presents with epigastric pain x 1 day with CT findings concerning for pSBO vs ileus. Patient reports she developed epigastric pain yesterday with associated nausea prompting her to present to the ED. Prior to yesterday, she was otherwise feeling well. Her last bowel movement was yesterday and she is passing flatus. She has intermittent nausea with the pain but denies vomiting. She denies fever or chills at home. Patient had multiple abdominal surgeries including prior ex lap.    Review of Systems   Constitutional:  Negative for activity change, appetite change, chills and fever.   Respiratory:  Negative for chest tightness and shortness of breath.    Cardiovascular:  Negative for chest pain.   Gastrointestinal:  Positive for abdominal pain and nausea. Negative for abdominal distention and vomiting.   Skin:  Negative for color change.   Psychiatric/Behavioral:  Negative for agitation, behavioral problems and confusion.        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    Depression     History of transfusion     Hypertension     MRSA (methicillin resistant Staphylococcus aureus)     Pneumonia      Past Surgical History:   Procedure Laterality  Date    APPENDECTOMY       SECTION      CHOLECYSTECTOMY      COLON SURGERY      DILATION AND CURETTAGE OF UTERUS      EXTRACTION, ERUPTED TOOTH REQUIRING REMOVAL OF BONE AND/OR SECTIONING OF TOOTH, AND INCLUDING ELEVATION OF MUCOPERIOSTEAL FLAP IF INDICATED N/A 2024    Procedure: EXTRACTION TEETH MULTIPLE -2,3,7,10, 14,19,29,30;  Surgeon: Livan Trotter DMD;  Location: [facility];  Service: Maxillofacial    EYE SURGERY      GASTRIC BYPASS      HERNIA REPAIR      several    HYSTERECTOMY      JOINT REPLACEMENT       Social History   Social History     Substance and Sexual Activity   Alcohol Use Never     Social History     Substance and Sexual Activity   Drug Use Never     Social History     Tobacco Use   Smoking Status Never   Smokeless Tobacco Never     E-Cigarette/Vaping    E-Cigarette Use Never User      E-Cigarette/Vaping Substances     Family History: No family history on file.    Meds/Allergies   PTA meds:   Prior to Admission Medications   Prescriptions Last Dose Informant Patient Reported? Taking?   Cholecalciferol 125 MCG (5000 UT) capsule   Yes No   Sig: Take 10,000 Units by mouth every 30 (thirty) days   LORazepam (ATIVAN) 1 mg tablet   Yes No   Sig: Take 1 mg by mouth 3 (three) times a day as needed   diphenhydrAMINE (Benadryl Allergy) 25 mg tablet   Yes No   Sig: Take by mouth if needed   fluticasone (Flonase Allergy Relief) 50 mcg/act nasal spray   Yes No   Sig: into each nostril   hydrochlorothiazide (HYDRODIURIL) 12.5 mg tablet   Yes No   Sig: Take 12.5 mg by mouth daily   latanoprost (XALATAN) 0.005 % ophthalmic solution   Yes No   Sig: Apply 1 drop to eye   lisinopril (ZESTRIL) 10 mg tablet   Yes No   Sig: Take 10 mg by mouth daily   methocarbamol (ROBAXIN) 500 mg tablet   Yes No   Sig: Take 500 mg by mouth 2 (two) times a day as needed   ondansetron (ZOFRAN) 4 mg tablet   No No   Sig: Take 1 tablet (4 mg total) by mouth every 6 (six) hours as needed for nausea or vomiting    sulfamethoxazole-trimethoprim (BACTRIM DS) 800-160 mg per tablet   Yes No   Si tablet every 12 (twelve) hours   traMADol (ULTRAM) 50 mg tablet   Yes No   Sig: Take 50 mg by mouth every 6 (six) hours as needed   venlafaxine (EFFEXOR) 75 mg tablet   Yes No   Sig: Take 75 mg by mouth   venlafaxine (EFFEXOR-XR) 150 mg 24 hr capsule   Yes No   Sig: Take 150 mg by mouth daily      Facility-Administered Medications: None     Allergies   Allergen Reactions    Alprazolam Other (See Comments) and Hives     ineffective  ineffective      Azithromycin Hives    Cephalexin Hives    Codeine Hyperactivity, Other (See Comments) and Hives     Hyperactivity and agitation  Hyperactivity and agitation      Diclofenac GI Intolerance    Diclofenac Sodium Itching    Duloxetine Other (See Comments) and Hives     Dizziness and confusion  Dizziness and confusion      Duloxetine Hcl Dizziness and Other (See Comments)    Erythromycin Hives    Fentanyl Itching    Hydrochlorothiazide Other (See Comments)     Other reaction(s): hypotensive    Hydromorphone Hives    Ibuprofen Other (See Comments) and GI Intolerance     Stomach pains  Stomach pains      Iodinated Contrast Media Hives    Iodine - Food Allergy Itching     CT dye    Latex Itching    Medical Tape Itching and Other (See Comments)      Paper tape - skin tears off      Meloxicam GI Intolerance    Methadone Other (See Comments) and Hives     agitation  agitation      Methylprednisolone Other (See Comments)     Raises BP  Raises blood pressure      Milnacipran Itching    Mirtazapine Other (See Comments) and Hives     hallucinations  hallucinations      Molds & Smuts Sneezing    Morphine Other (See Comments)     Agitation  Agitation if on for more than a few days  Agitation if on for more than a few days      Naloxone Other (See Comments)    Naproxen Other (See Comments) and GI Bleeding     Stomach pains  Stomach pain      Nefazodone Hives    Nitrofurantoin Hives     Oxycodone-Acetaminophen Itching and Hives     Agitation  Itchy and agitation      Pentazocine Other (See Comments)     Other reaction(s): did not help with pain    Pollen Extract Sneezing    Prednisone Other (See Comments)     hypertension  Tolerates steroidal inhalers  Tolerates steroidal inhalers      Pregabalin Other (See Comments)     Pt stated she is unsure why she can't take, just knows she cant take the med.  agitation      Prochlorperazine Other (See Comments)     Extreme agitation  Extreme agitation      Rofecoxib Other (See Comments)     Other reaction(s): ineffective    Sertraline Other (See Comments)     agitation  agitation      Tapentadol GI Intolerance    Amiloride Rash     Other reaction(s): hypotensive    Celecoxib Rash     Pt stated she is unsure why she can't take, just knows she cant take the med.      Clonazepam Rash    Penicillins Hives and Rash       Objective   First Vitals:   Blood Pressure: (!) 171/77 (05/1945)  Pulse: 70 (05/1945)  Temperature: 98.5 °F (36.9 °C) (05/1946)  Temp Source: Oral (05/1946)  Respirations: 22 (05/1946)  SpO2: 98 % (05/1946)    Current Vitals:   Blood Pressure: (!) 175/75 (05/25/24 0516)  Pulse: 67 (05/25/24 0516)  Temperature: 98.5 °F (36.9 °C) (05/1946)  Temp Source: Oral (05/1946)  Respirations: 17 (05/25/24 0516)  SpO2: 97 % (05/25/24 0516)    No intake or output data in the 24 hours ending 05/25/24 0748    Invasive Devices       Peripheral Intravenous Line  Duration             Peripheral IV 05/25/24 Left;Proximal;Ventral (anterior) Forearm <1 day                    Physical Exam  Vitals and nursing note reviewed.   Constitutional:       General: She is not in acute distress.     Appearance: Normal appearance. She is not ill-appearing.   HENT:      Head: Normocephalic and atraumatic.      Mouth/Throat:      Mouth: Mucous membranes are moist.      Pharynx: Oropharynx is clear.   Eyes:      Extraocular Movements:  Extraocular movements intact.   Cardiovascular:      Rate and Rhythm: Normal rate and regular rhythm.   Pulmonary:      Effort: Pulmonary effort is normal. No respiratory distress.   Abdominal:      General: Abdomen is flat. There is no distension.      Palpations: Abdomen is soft.      Tenderness: There is no abdominal tenderness. There is no guarding or rebound.   Musculoskeletal:         General: Normal range of motion.      Cervical back: Neck supple.      Right lower leg: No edema.      Left lower leg: No edema.   Skin:     General: Skin is warm and dry.      Coloration: Skin is not jaundiced.   Neurological:      General: No focal deficit present.      Mental Status: She is alert and oriented to person, place, and time. Mental status is at baseline.   Psychiatric:         Mood and Affect: Mood normal.         Behavior: Behavior normal.         Thought Content: Thought content normal.         Judgment: Judgment normal.         Lab Results: CBC:   Lab Results   Component Value Date    WBC 8.29 05/24/2024    HGB 10.9 (L) 05/24/2024    HCT 33.8 (L) 05/24/2024    MCV 96 05/24/2024     05/24/2024    RBC 3.54 (L) 05/24/2024    MCH 30.8 05/24/2024    MCHC 32.2 05/24/2024    RDW 13.1 05/24/2024    MPV 9.2 05/24/2024    NRBC 0 05/24/2024   , CMP:   Lab Results   Component Value Date    SODIUM 135 05/24/2024    K 5.1 05/24/2024     05/24/2024    CO2 21 05/24/2024    BUN 30 (H) 05/24/2024    CREATININE 1.07 05/24/2024    CALCIUM 9.4 05/24/2024    EGFR 49 05/24/2024     Imaging:   CT abdomen pelvis wo contrast   Final Result by Moose Stubbs MD (05/25 0706)      A very small amount of enteric contrast administered for this examination has reached predominantly collapsed distal small bowel loops indicating that there is no evidence for complete bowel obstruction. There is either ileus of central small bowel loops    or mild partial bowel obstruction most likely on the basis of adhesions.      Enteric  "contrast fills the previously excluded portion of the stomach, most consistent with gastrogastric fistula. Enteric contrast then fills duodenum and proximal small bowel with none of the contrast entering into a proximal jejunal \"alimentary\" loop.    Correlation with the patient's history of bowel surgery is recommended.            I personally discussed this study with emergency department physician on 5/25/2024 7:05 AM.               Workstation performed: [id number]         CT abdomen pelvis wo contrast   Final Result by Saji Francis MD (05/25 0152)      1.  Dilated fluid-filled small bowel loops with transition point in the mid abdomen compatible with small bowel obstruction.   2.  Punctate focus of gas within urinary bladder, correlate with urinalysis to exclude infection.      I personally discussed this study with Corey Gipson on 5/25/2024 1:52 AM.                  Workstation performed: [id number]             EKG, Pathology, and Other Studies: I have personally reviewed pertinent reports.      Code Status: No Order  Advance Directive and Living Will:      Power of :    POLST:      Counseling / Coordination of Care  Total floor / unit time spent today 40 minutes.  Greater than 50% of total time was spent with the patient and / or family counseling and / or coordination of care.      "

## 2024-05-25 NOTE — ED PROVIDER NOTES
History  Chief Complaint   Patient presents with    Abdominal Pain     Pt reporting epigastric pain starting yesterday after the patient ate a meal from meals on wheels. Patient took zofran and felt better after, but pain started again today. Pt took zofran twice today. Last dose about an hour ago. Pt reports no vomiting or diarrhea.      71-year-old female with past medical history of gastric bypass, multiple prior abdominal surgeries presents to the ED for evaluation of epigastric abdominal pain beginning last night.  Patient states that she had a [employer] meal last night.  She subsequently developed cramping abdominal pain.  She reports the pain is constant.  She denies associated fever, chills, vomiting, diarrhea, urinary complaints.  She had a bowel movement this morning.  Patient states she is passing gas.  Patient took Zofran for nausea at home, but has not vomited. She states she was feeling generally well prior to yesterday.         Prior to Admission Medications   Prescriptions Last Dose Informant Patient Reported? Taking?   Cholecalciferol 125 MCG (5000 UT) capsule   Yes No   Sig: Take 10,000 Units by mouth every 30 (thirty) days   LORazepam (ATIVAN) 1 mg tablet   Yes No   Sig: Take 1 mg by mouth 3 (three) times a day as needed   diphenhydrAMINE (Benadryl Allergy) 25 mg tablet   Yes No   Sig: Take by mouth if needed   fluticasone (Flonase Allergy Relief) 50 mcg/act nasal spray   Yes No   Sig: into each nostril   hydrochlorothiazide (HYDRODIURIL) 12.5 mg tablet   Yes No   Sig: Take 12.5 mg by mouth daily   latanoprost (XALATAN) 0.005 % ophthalmic solution   Yes No   Sig: Apply 1 drop to eye   lisinopril (ZESTRIL) 10 mg tablet   Yes No   Sig: Take 10 mg by mouth daily   methocarbamol (ROBAXIN) 500 mg tablet   Yes No   Sig: Take 500 mg by mouth 2 (two) times a day as needed   ondansetron (ZOFRAN) 4 mg tablet   No No   Sig: Take 1 tablet (4 mg total) by mouth every 6 (six) hours as needed for nausea or  vomiting   sulfamethoxazole-trimethoprim (BACTRIM DS) 800-160 mg per tablet   Yes No   Si tablet every 12 (twelve) hours   traMADol (ULTRAM) 50 mg tablet   Yes No   Sig: Take 50 mg by mouth every 6 (six) hours as needed   venlafaxine (EFFEXOR) 75 mg tablet   Yes No   Sig: Take 75 mg by mouth   venlafaxine (EFFEXOR-XR) 150 mg 24 hr capsule   Yes No   Sig: Take 150 mg by mouth daily      Facility-Administered Medications: None       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    Depression     History of transfusion     Hypertension     MRSA (methicillin resistant Staphylococcus aureus)     Pneumonia        Past Surgical History:   Procedure Laterality Date    APPENDECTOMY       SECTION      CHOLECYSTECTOMY      COLON SURGERY      DILATION AND CURETTAGE OF UTERUS      EXTRACTION, ERUPTED TOOTH REQUIRING REMOVAL OF BONE AND/OR SECTIONING OF TOOTH, AND INCLUDING ELEVATION OF MUCOPERIOSTEAL FLAP IF INDICATED N/A 2024    Procedure: EXTRACTION TEETH MULTIPLE -2,3,7,10, 14,19,29,30;  Surgeon: Livan Trotter DMD;  Location: [facility];  Service: Maxillofacial    EYE SURGERY      GASTRIC BYPASS      HERNIA REPAIR      several    HYSTERECTOMY      JOINT REPLACEMENT         No family history on file.  I have reviewed and agree with the history as documented.    E-Cigarette/Vaping    E-Cigarette Use Never User      E-Cigarette/Vaping Substances     Social History     Tobacco Use    Smoking status: Never    Smokeless tobacco: Never   Vaping Use    Vaping status: Never Used   Substance Use Topics    Alcohol use: Never    Drug use: Never        Review of Systems   Constitutional:  Negative for chills and fever.   Respiratory:  Negative for cough and shortness of breath.    Cardiovascular:  Negative for chest pain.   Gastrointestinal:  Positive for abdominal pain and nausea. Negative for  constipation and vomiting.   Genitourinary:  Negative for difficulty urinating.   Musculoskeletal:  Negative for back pain and neck pain.   Skin:  Negative for color change.   Neurological:  Negative for dizziness, weakness, light-headedness, numbness and headaches.       Physical Exam  ED Triage Vitals [05/1945]   Temperature Pulse Respirations Blood Pressure SpO2   98.5 °F (36.9 °C) 70 22 (!) 171/77 98 %      Temp Source Heart Rate Source Patient Position - Orthostatic VS BP Location FiO2 (%)   Oral Monitor Lying Right arm --      Pain Score       --             Orthostatic Vital Signs  Vitals:    05/1945 05/25/24 0516 05/25/24 0813   BP: (!) 171/77 (!) 175/75 131/58   Pulse: 70 67 72   Patient Position - Orthostatic VS: Lying  Lying       Physical Exam  Vitals and nursing note reviewed.   Constitutional:       General: She is not in acute distress.     Appearance: She is well-developed and normal weight. She is not ill-appearing or toxic-appearing.   HENT:      Head: Normocephalic and atraumatic.      Mouth/Throat:      Mouth: Mucous membranes are moist.   Eyes:      Extraocular Movements: Extraocular movements intact.   Cardiovascular:      Rate and Rhythm: Normal rate and regular rhythm.      Heart sounds: Normal heart sounds.   Pulmonary:      Effort: Pulmonary effort is normal.      Breath sounds: Normal breath sounds.   Abdominal:      General: Abdomen is flat.      Palpations: Abdomen is soft.      Tenderness: There is abdominal tenderness in the epigastric area. There is no right CVA tenderness, left CVA tenderness, guarding or rebound.   Skin:     General: Skin is warm and dry.   Neurological:      Mental Status: She is alert and oriented to person, place, and time.         ED Medications  Medications   iohexol (OMNIPAQUE) 240 MG/ML solution 50 mL (has no administration in time range)   multi-electrolyte (PLASMALYTE-A/ISOLYTE-S PH 7.4) IV solution (100 mL/hr Intravenous New Bag 5/25/24 0815)    heparin (porcine) subcutaneous injection 5,000 Units (5,000 Units Subcutaneous Given 5/25/24 0833)   sulfamethoxazole-trimethoprim (BACTRIM DS) 800-160 mg per tablet 1 tablet (1 tablet Oral Given 5/25/24 0833)   ondansetron (ZOFRAN) injection 4 mg (has no administration in time range)   dicyclomine (BENTYL) tablet 20 mg (20 mg Oral Given 5/24/24 2143)   aluminum-magnesium hydroxide-simethicone (MAALOX) oral suspension 30 mL (30 mL Oral Given 5/24/24 2143)   diphenhydrAMINE (BENADRYL) injection 25 mg (25 mg Intravenous Given 5/25/24 0403)       Diagnostic Studies  Results Reviewed       Procedure Component Value Units Date/Time    Lactic acid, plasma (w/reflex if result > 2.0) [[id number]]  (Normal) Collected: 05/25/24 0228    Lab Status: Final result Specimen: Blood from Arm, Right Updated: 05/25/24 0451     LACTIC ACID 1.2 mmol/L     Narrative:      Result may be elevated if tourniquet was used during collection.    Urine Microscopic [[id number]]  (Abnormal) Collected: 05/25/24 0228    Lab Status: Final result Specimen: Urine, Other Updated: 05/25/24 0438     RBC, UA 2-4 /hpf      WBC, UA 2-4 /hpf      Epithelial Cells Occasional /hpf      Bacteria, UA Innumerable /hpf      MUCUS THREADS Occasional     Hyaline Casts, UA 5-10 /lpf      Amorphous Crystals, UA Occasional     Uric Acid Sherron, UA Moderate /hpf     UA w Reflex to Microscopic w Reflex to Culture [[id number]]  (Abnormal) Collected: 05/25/24 0228    Lab Status: Final result Specimen: Urine, Other Updated: 05/25/24 0405     Color, UA Yellow     Clarity, UA Clear     Specific Gravity, UA 1.018     pH, UA 5.5     Leukocytes, UA Negative     Nitrite, UA Positive     Protein, UA Negative mg/dl      Glucose, UA Negative mg/dl      Ketones, UA Negative mg/dl      Urobilinogen, UA <2.0 mg/dl      Bilirubin, UA Negative     Occult Blood, UA Negative    HS Troponin I 4hr [[id number]]  (Normal) Collected: 05/25/24 0228    Lab Status: Final result Specimen: Blood from  Arm, Right Updated: 05/25/24 0336     hs TnI 4hr 7 ng/L      Delta 4hr hsTnI 1 ng/L     HS Troponin I 2hr [[id number]]  (Normal) Collected: 05/24/24 2358    Lab Status: Final result Specimen: Blood from Arm, Left Updated: 05/25/24 0123     hs TnI 2hr 7 ng/L      Delta 2hr hsTnI 1 ng/L     HS Troponin 0hr (reflex protocol) [[id number]]  (Normal) Collected: 05/24/24 2142    Lab Status: Final result Specimen: Blood from Arm, Left Updated: 05/24/24 2214     hs TnI 0hr 6 ng/L     Basic metabolic panel [[id number]]  (Abnormal) Collected: 05/24/24 2142    Lab Status: Final result Specimen: Blood from Arm, Left Updated: 05/24/24 2211     Sodium 135 mmol/L      Potassium 5.1 mmol/L      Chloride 106 mmol/L      CO2 21 mmol/L      ANION GAP 8 mmol/L      BUN 30 mg/dL      Creatinine 1.07 mg/dL      Glucose 109 mg/dL      Calcium 9.4 mg/dL      eGFR 49 ml/min/1.73sq m     Narrative:      National Kid[employer] guidelines for Chronic Kidney Disease (CKD):     Stage 1 with normal or high GFR (GFR > 90 mL/min/1.73 square meters)    Stage 2 Mild CKD (GFR = 60-89 mL/min/1.73 square meters)    Stage 3A Moderate CKD (GFR = 45-59 mL/min/1.73 square meters)    Stage 3B Moderate CKD (GFR = 30-44 mL/min/1.73 square meters)    Stage 4 Severe CKD (GFR = 15-29 mL/min/1.73 square meters)    Stage 5 End Stage CKD (GFR <15 mL/min/1.73 square meters)  Note: GFR calculation is accurate only with a steady state creatinine    Lipase [[id number]]  (Normal) Collected: 05/24/24 2142    Lab Status: Final result Specimen: Blood from Arm, Left Updated: 05/24/24 2211     Lipase 37 u/L     CBC and differential [[id number]]  (Abnormal) Collected: 05/24/24 2142    Lab Status: Final result Specimen: Blood from Arm, Left Updated: 05/24/24 2150     WBC 8.29 Thousand/uL      RBC 3.54 Million/uL      Hemoglobin 10.9 g/dL      Hematocrit 33.8 %      MCV 96 fL      MCH 30.8 pg      MCHC 32.2 g/dL      RDW 13.1 %      MPV 9.2 fL      Platelets 253  "Thousands/uL      nRBC 0 /100 WBCs      Segmented % 83 %      Immature Grans % 0 %      Lymphocytes % 12 %      Monocytes % 4 %      Eosinophils Relative 1 %      Basophils Relative 0 %      Absolute Neutrophils 6.84 Thousands/µL      Absolute Immature Grans 0.03 Thousand/uL      Absolute Lymphocytes 0.98 Thousands/µL      Absolute Monocytes 0.35 Thousand/µL      Eosinophils Absolute 0.06 Thousand/µL      Basophils Absolute 0.03 Thousands/µL                    CT abdomen pelvis wo contrast   Final Result by Moose Stubbs MD (05/25 0706)      A very small amount of enteric contrast administered for this examination has reached predominantly collapsed distal small bowel loops indicating that there is no evidence for complete bowel obstruction. There is either ileus of central small bowel loops    or mild partial bowel obstruction most likely on the basis of adhesions.      Enteric contrast fills the previously excluded portion of the stomach, most consistent with gastrogastric fistula. Enteric contrast then fills duodenum and proximal small bowel with none of the contrast entering into a proximal jejunal \"alimentary\" loop.    Correlation with the patient's history of bowel surgery is recommended.            I personally discussed this study with emergency department physician on 5/25/2024 7:05 AM.               Workstation performed: [id number]         CT abdomen pelvis wo contrast   Final Result by Saji Francis MD (05/25 0152)      1.  Dilated fluid-filled small bowel loops with transition point in the mid abdomen compatible with small bowel obstruction.   2.  Punctate focus of gas within urinary bladder, correlate with urinalysis to exclude infection.      I personally discussed this study with Corey Gipson on 5/25/2024 1:52 AM.                  Workstation performed: [id number]               Procedures  Procedures      ED Course  ED Course as of 05/25/24 1125   Sat May 25, 2024   0151 CT abdomen pelvis " results discussed with radiology. There are dilated fluid filled small bowel loops suggestive of Small bowel obstruction. There is also a focus of gas in bladder, recommended check UA.    0212 Case discussed with red surgery, who will come evaluate the patient.                                       Medical Decision Making  79-year-old female with past medical history multiple abdominal surgeries presenting for 2 days of epigastric cramping.  Patient reports nausea but no vomiting or diarrhea.  Patient is passing gas and last bowel movement was this morning.  On exam, vitals stable.  Afebrile.  Abdomen is soft with tenderness to palpation of the epigastric region.  No rebound or guarding.  Differential diagnosis: Given patient's history of multiple abdominal surgeries, rule out small bowel obstruction. Patient has a history of cholecystectomy, appendectomy, hysterectomy. Also considering gastroenteritis.   Plan: CBC, BMP, lactic acid, EKG, troponin, UA. CT abdomen pelvis without contrast.   Reassessment: CT abdomen pelvis without contrast was initially ordered due to patient's allergy to IV contrast dye. Results showed dilated small bowel loops concerning for small bowel obstruction. Surgery was consulted and a CT abdomen pelvis with PO contrast was ordered which showed findings concerning for ileus of small bowel loops vs small bowel obstruction. Patient was admitted to surgery for observation    Amount and/or Complexity of Data Reviewed  Labs: ordered.  Radiology: ordered.    Risk  OTC drugs.  Prescription drug management.  Decision regarding hospitalization.          Disposition  Final diagnoses:   Small bowel obstruction (HCC)     Time reflects when diagnosis was documented in both MDM as applicable and the Disposition within this note       Time User Action Codes Description Comment    5/25/2024  2:16 AM Corey Gipson Add [K56.609] Small bowel obstruction (HCC)           ED Disposition       ED Disposition    Admit    Condition   Stable    Date/Time   Sat May 25, 2024  2:17 AM    Comment   Case was discussed with surgery and the patient's admission status was agreed to be Admission Status: inpatient status to the service of   .               Follow-up Information       Follow up With Specialties Details Why Contact Info Additional Information    [facility] Schedule an appointment as soon as possible for a visit in 1 week to discuss need for endoscopy [address]  [address]  [facility] [address]  [phone] [facility] [place], [address], [address], [place], [place], [address]   [phone]            Patient's Medications   Discharge Prescriptions    No medications on file     No discharge procedures on file.    PDMP Review         Value Time User    PDMP Reviewed  Yes 1/31/2024  2:24 PM JUNE Stokes             ED Provider  Attending physically available and evaluated Patricia Mariscal. I managed the patient along with the ED Attending.    Electronically Signed by           Corey Gipson DO  05/25/24 1126

## 2024-05-25 NOTE — ED ATTENDING ATTESTATION
5/24/2024  I, Alex Rojo DO, saw and evaluated the patient. I have discussed the patient with the resident/non-physician practitioner and agree with the resident's/non-physician practitioner's findings, Plan of Care, and MDM as documented in the resident's/non-physician practitioner's note, except where noted. All available labs and Radiology studies were reviewed.  I was present for key portions of any procedure(s) performed by the resident/non-physician practitioner and I was immediately available to provide assistance.       At this point I agree with the current assessment done in the Emergency Department.  I have conducted an independent evaluation of this patient a history and physical is as follows:    79-year-old female with history of Freya-en-Y gastric bypass, multiple prior abdominal surgeries complicated by multiple prior abdominal abscesses presents for evaluation of epigastric abdominal pain without radiation ongoing after she had a [employer] entrée last night denies fever, chest pain, shortness of breath.     Impression: epigastric abd pain plan: CT abd/pelvis eval SBO, perf. Abd labs, ECG, trop, reassess.      ED Course         Critical Care Time  Procedures

## 2024-05-26 VITALS
HEIGHT: 58 IN | WEIGHT: 231 LBS | DIASTOLIC BLOOD PRESSURE: 75 MMHG | SYSTOLIC BLOOD PRESSURE: 162 MMHG | HEART RATE: 83 BPM | RESPIRATION RATE: 18 BRPM | OXYGEN SATURATION: 98 % | TEMPERATURE: 98.6 F | BODY MASS INDEX: 48.49 KG/M2

## 2024-05-26 PROBLEM — K56.600 PARTIAL SMALL BOWEL OBSTRUCTION (HCC): Status: ACTIVE | Noted: 2024-05-26

## 2024-05-26 LAB
ANION GAP SERPL CALCULATED.3IONS-SCNC: 10 MMOL/L (ref 4–13)
BASOPHILS # BLD AUTO: 0.05 THOUSANDS/ÂΜL (ref 0–0.1)
BASOPHILS NFR BLD AUTO: 1 % (ref 0–1)
BUN SERPL-MCNC: 19 MG/DL (ref 5–25)
CALCIUM SERPL-MCNC: 8.7 MG/DL (ref 8.4–10.2)
CHLORIDE SERPL-SCNC: 106 MMOL/L (ref 96–108)
CO2 SERPL-SCNC: 21 MMOL/L (ref 21–32)
CREAT SERPL-MCNC: 0.99 MG/DL (ref 0.6–1.3)
EOSINOPHIL # BLD AUTO: 0.16 THOUSAND/ÂΜL (ref 0–0.61)
EOSINOPHIL NFR BLD AUTO: 3 % (ref 0–6)
ERYTHROCYTE [DISTWIDTH] IN BLOOD BY AUTOMATED COUNT: 13.1 % (ref 11.6–15.1)
GFR SERPL CREATININE-BSD FRML MDRD: 54 ML/MIN/1.73SQ M
GLUCOSE P FAST SERPL-MCNC: 95 MG/DL (ref 65–99)
GLUCOSE SERPL-MCNC: 95 MG/DL (ref 65–140)
HCT VFR BLD AUTO: 33.5 % (ref 34.8–46.1)
HGB BLD-MCNC: 10.7 G/DL (ref 11.5–15.4)
IMM GRANULOCYTES # BLD AUTO: 0.01 THOUSAND/UL (ref 0–0.2)
IMM GRANULOCYTES NFR BLD AUTO: 0 % (ref 0–2)
LYMPHOCYTES # BLD AUTO: 2.27 THOUSANDS/ÂΜL (ref 0.6–4.47)
LYMPHOCYTES NFR BLD AUTO: 37 % (ref 14–44)
MAGNESIUM SERPL-MCNC: 1.7 MG/DL (ref 1.9–2.7)
MCH RBC QN AUTO: 30.7 PG (ref 26.8–34.3)
MCHC RBC AUTO-ENTMCNC: 31.9 G/DL (ref 31.4–37.4)
MCV RBC AUTO: 96 FL (ref 82–98)
MONOCYTES # BLD AUTO: 0.47 THOUSAND/ÂΜL (ref 0.17–1.22)
MONOCYTES NFR BLD AUTO: 8 % (ref 4–12)
NEUTROPHILS # BLD AUTO: 3.25 THOUSANDS/ÂΜL (ref 1.85–7.62)
NEUTS SEG NFR BLD AUTO: 51 % (ref 43–75)
NRBC BLD AUTO-RTO: 0 /100 WBCS
PHOSPHATE SERPL-MCNC: 3 MG/DL (ref 2.3–4.1)
PLATELET # BLD AUTO: 243 THOUSANDS/UL (ref 149–390)
PMV BLD AUTO: 9.4 FL (ref 8.9–12.7)
POTASSIUM SERPL-SCNC: 4.3 MMOL/L (ref 3.5–5.3)
RBC # BLD AUTO: 3.49 MILLION/UL (ref 3.81–5.12)
SODIUM SERPL-SCNC: 137 MMOL/L (ref 135–147)
WBC # BLD AUTO: 6.21 THOUSAND/UL (ref 4.31–10.16)

## 2024-05-26 PROCEDURE — 80048 BASIC METABOLIC PNL TOTAL CA: CPT | Performed by: STUDENT IN AN ORGANIZED HEALTH CARE EDUCATION/TRAINING PROGRAM

## 2024-05-26 PROCEDURE — 84100 ASSAY OF PHOSPHORUS: CPT | Performed by: STUDENT IN AN ORGANIZED HEALTH CARE EDUCATION/TRAINING PROGRAM

## 2024-05-26 PROCEDURE — 85025 COMPLETE CBC W/AUTO DIFF WBC: CPT | Performed by: STUDENT IN AN ORGANIZED HEALTH CARE EDUCATION/TRAINING PROGRAM

## 2024-05-26 PROCEDURE — 83735 ASSAY OF MAGNESIUM: CPT | Performed by: STUDENT IN AN ORGANIZED HEALTH CARE EDUCATION/TRAINING PROGRAM

## 2024-05-26 RX ORDER — MAGNESIUM SULFATE HEPTAHYDRATE 40 MG/ML
2 INJECTION, SOLUTION INTRAVENOUS ONCE
Status: COMPLETED | OUTPATIENT
Start: 2024-05-26 | End: 2024-05-26

## 2024-05-26 RX ORDER — TRAMADOL HYDROCHLORIDE 50 MG/1
50 TABLET ORAL EVERY 6 HOURS PRN
Status: DISCONTINUED | OUTPATIENT
Start: 2024-05-26 | End: 2024-05-26 | Stop reason: HOSPADM

## 2024-05-26 RX ADMIN — SULFAMETHOXAZOLE AND TRIMETHOPRIM 1 TABLET: 800; 160 TABLET ORAL at 09:28

## 2024-05-26 RX ADMIN — TRAMADOL HYDROCHLORIDE 50 MG: 50 TABLET, COATED ORAL at 05:04

## 2024-05-26 RX ADMIN — VENLAFAXINE 75 MG: 37.5 TABLET ORAL at 09:27

## 2024-05-26 RX ADMIN — MAGNESIUM SULFATE HEPTAHYDRATE 2 G: 40 INJECTION, SOLUTION INTRAVENOUS at 09:22

## 2024-05-26 RX ADMIN — HEPARIN SODIUM 5000 UNITS: 5000 INJECTION INTRAVENOUS; SUBCUTANEOUS at 05:04

## 2024-05-26 NOTE — PROGRESS NOTES
"General Surgery  Progress Note   Patricia Mariscal 79 y.o. female MRN: [id number]  Unit/Bed#: CW2 215-01 Encounter: [id number]    Assessment:  78 yo female with prior Freya-en-Y gastric bypass presents with pSBO vs ileus  UTI (POA)     Vital signs stable, afebrile.     UOP: 650 cc    WBC 6.21 (8.29)  Hgb 10.7 (10.9)  Cr 0.99 (1.07)    Plan:  Advance diet as tolerated  D/c IVF with diet advancement  DVT ppx: Heparin  Pain/ nausea control PRN  OOB/ ambulation  Incentive Spirometry      Subjective/Objective     Subjective:   Patient seen and examined at bedside, in no acute distress. No acute events overnight. Patient's pain is well controlled this AM but had some pain overnight. She denies nausea or vomiting. Passing flatus and having bowel movements.    Objective:   Vitals:Blood pressure 143/63, pulse 65, temperature 98.8 °F (37.1 °C), temperature source Oral, resp. rate 18, height 4' 10\" (1.473 m), weight 105 kg (231 lb), SpO2 96%.  Temp (24hrs), Av.1 °F (37.3 °C), Min:98.8 °F (37.1 °C), Max:99.3 °F (37.4 °C)        Intake/Output Summary (Last 24 hours) at 2024 0658  Last data filed at 2024 0428  Gross per 24 hour   Intake --   Output 625 ml   Net -625 ml       Invasive Devices       Peripheral Intravenous Line  Duration             Peripheral IV 24 Left;Proximal;Ventral (anterior) Forearm 1 day                    Physical Exam:  General: No acute distress, alert and oriented  CV: Well perfused, regular rate and rhythm  Lungs: Normal work of breathing, no increased respiratory effort on RA  Abdomen: Soft, non-tender, non-distended.  Extremities: No edema, clubbing or cyanosis  Skin: Warm, dry    Lab Results: BMP/CMP:   Lab Results   Component Value Date    SODIUM 137 2024    K 4.3 2024     2024    CO2 21 2024    BUN 19 2024    CREATININE 0.99 2024    CALCIUM 8.7 2024    EGFR 54 2024    and CBC:   Lab Results   Component Value Date    WBC 6.21 " 05/26/2024    HGB 10.7 (L) 05/26/2024    HCT 33.5 (L) 05/26/2024    MCV 96 05/26/2024     05/26/2024    RBC 3.49 (L) 05/26/2024    MCH 30.7 05/26/2024    MCHC 31.9 05/26/2024    RDW 13.1 05/26/2024    MPV 9.4 05/26/2024    NRBC 0 05/26/2024     VTE Prophylaxis: Sequential compression device (Venodyne)  and Heparin    Angela Lara MD  5/26/2024

## 2024-09-02 ENCOUNTER — TELEPHONE (OUTPATIENT)
Dept: OTHER | Facility: OTHER | Age: 79
End: 2024-09-02

## 2024-09-02 NOTE — TELEPHONE ENCOUNTER
PT. Called in to notify she is canceling her appointment for 9/3 @ 2:30, due to having a fall. She will call back to reschedule.

## 2024-09-06 NOTE — PROGRESS NOTES
Assessment:  1. Neck pain    2. Cervical radiculopathy    3. Trochanteric bursitis of right hip        Plan:  I will order an MRI of the cervical spine without contrast as patient does have concerning findings on physical exam, positive for an abnormal spinal reflex will call her with the results and treatment options based off of these results   Discussed physical therapy for cervical and low back complaints however patient declines  Discussed bilateral L5 TFESI however patient declines at this time as she feels like her pain is manageable  Patient will be scheduled for a right trochanteric bursa injection under fluoroscopy to address the inflammatory component the patient's pain.  Complete risks and benefits including bleeding, infection, tissue reaction, nerve injury and allergic reaction were discussed. The patient was agreeable and verbalized an understanding  Patient will follow-up after procedure or sooner if needed    History of Present Illness:    The patient is a 78 y.o. female with a history of fibromyalgia and gastric bypass surgery last seen on 9/28/2023 who presents for a follow up office visit in regards to chronic right-sided neck pain that will radiate into the scapula and posterior aspect of the right upper extremity to the elbow.  She also complains of right lateral hip pain and low back pain that will radiate into the lateral aspect of the bilateral lower extremities.  Patient is status post right intra-articular shoulder injection November 14, 2023.  Patient states this helped the localized shoulder pain however did not help any of her radiating arm pain.  She is also status post right L3 and L4 TFESI December 27, 2023 with some improvement.  She states her lower extremity symptoms are manageable at this time.    The patient rates her pain a 6 out of 10 on the numeric pain rating scale.  Her pain is described as dull aching, throbbing and cramping.  She is managing her pain with tramadol 50 mg  Offer hearing amplifiers while awake  Speak loud and clear   as needed and methocarbamol as needed    I have personally reviewed and/or updated the patient's past medical history, past surgical history, family history, social history, current medications, allergies, and vital signs today.       Review of Systems:    Review of Systems   Respiratory:  Negative for shortness of breath.    Cardiovascular:  Negative for chest pain.   Gastrointestinal:  Negative for constipation, diarrhea, nausea and vomiting.   Musculoskeletal:  Positive for back pain and gait problem. Negative for arthralgias, joint swelling and myalgias.   Skin:  Negative for rash.   Neurological:  Negative for dizziness, seizures and weakness.   All other systems reviewed and are negative.        Past Medical History:   Diagnosis Date    Anxiety     Arthritis     Depression     History of transfusion     Hypertension        Past Surgical History:   Procedure Laterality Date    APPENDECTOMY       SECTION      CHOLECYSTECTOMY      COLON SURGERY      DILATION AND CURETTAGE OF UTERUS      EYE SURGERY      GASTRIC BYPASS      HERNIA REPAIR      HYSTERECTOMY      JOINT REPLACEMENT         History reviewed. No pertinent family history.    Social History     Occupational History    Not on file   Tobacco Use    Smoking status: Never    Smokeless tobacco: Never   Vaping Use    Vaping status: Never Used   Substance and Sexual Activity    Alcohol use: Never    Drug use: Never    Sexual activity: Not Currently         Current Outpatient Medications:     acetaminophen (TYLENOL) 500 mg tablet, Take 500 mg by mouth, Disp: , Rfl:     atenolol (TENORMIN) 50 mg tablet, Take 50 mg by mouth daily, Disp: , Rfl:     Cholecalciferol 125 MCG (5000 UT) capsule, Take 10,000 Units by mouth, Disp: , Rfl:     Cholecalciferol 125 MCG (5000 UT) TABS, Take 5,000 Units by mouth, Disp: , Rfl:     Cranberry, Vacc oxycoccus, (Cranberry Extract) 200 MG CAPS, Take by mouth, Disp: , Rfl:     diphenhydrAMINE (Benadryl Allergy) 25 mg tablet,  Take by mouth, Disp: , Rfl:     dorzolamide (TRUSOPT) 2 % ophthalmic solution, 1 drop, Disp: , Rfl:     fluticasone (Flonase Allergy Relief) 50 mcg/act nasal spray, into each nostril, Disp: , Rfl:     hydrochlorothiazide (HYDRODIURIL) 12.5 mg tablet, Take 12.5 mg by mouth daily, Disp: , Rfl:     Hypromellose 0.3 % SOLN, 1 drop 4 (four) times a day as needed, Disp: , Rfl:     latanoprost (XALATAN) 0.005 % ophthalmic solution, Apply 1 drop to eye, Disp: , Rfl:     lisinopril (ZESTRIL) 10 mg tablet, Take by mouth, Disp: , Rfl:     LORazepam (ATIVAN) 1 mg tablet, Take 1 mg by mouth 3 (three) times a day as needed, Disp: , Rfl:     methocarbamol (ROBAXIN) 500 mg tablet, Take 500 mg by mouth 2 (two) times a day as needed, Disp: , Rfl:     ondansetron (ZOFRAN) 4 mg tablet, Take 1 tablet (4 mg total) by mouth every 6 (six) hours as needed for nausea or vomiting, Disp: 12 tablet, Rfl: 0    sulfamethoxazole-trimethoprim (BACTRIM DS) 800-160 mg per tablet, , Disp: , Rfl:     traMADol (ULTRAM) 50 mg tablet, Take 50 mg by mouth every 6 (six) hours as needed, Disp: , Rfl:     venlafaxine (EFFEXOR) 75 mg tablet, Take 75 mg by mouth, Disp: , Rfl:     venlafaxine (EFFEXOR-XR) 150 mg 24 hr capsule, Take 150 mg by mouth daily, Disp: , Rfl:     busPIRone (BUSPAR) 10 mg tablet, Take 10 mg by mouth 3 (three) times a day (Patient not taking: Reported on 6/9/2023), Disp: , Rfl:     Allergies   Allergen Reactions    Alprazolam Other (See Comments) and Hives     ineffective  ineffective      Azithromycin Hives    Cephalexin Hives    Codeine Hyperactivity, Other (See Comments) and Hives     Hyperactivity and agitation  Hyperactivity and agitation      Diclofenac GI Intolerance    Diclofenac Sodium Itching    Duloxetine Other (See Comments) and Hives     Dizziness and confusion  Dizziness and confusion      Duloxetine Hcl Dizziness and Other (See Comments)    Erythromycin Hives    Fentanyl Itching    Hydrochlorothiazide Other (See Comments)     " Other reaction(s): hypotensive    Hydromorphone Hives    Ibuprofen Other (See Comments) and GI Intolerance     Stomach pains  Stomach pains      Iodinated Contrast Media Hives    Iodine - Food Allergy Itching     CT dye    Latex Itching    Medical Tape Itching and Other (See Comments)      Paper tape - skin tears off      Meloxicam GI Intolerance    Methadone Other (See Comments) and Hives     agitation  agitation      Methylprednisolone Other (See Comments)     Raises BP  Raises blood pressure      Milnacipran Itching    Mirtazapine Other (See Comments) and Hives     hallucinations  hallucinations      Molds & Smuts Sneezing    Morphine Other (See Comments)     Agitation  Agitation if on for more than a few days  Agitation if on for more than a few days      Naloxone Other (See Comments)    Naproxen Other (See Comments) and GI Bleeding     Stomach pains  Stomach pain      Nefazodone Hives    Nitrofurantoin Hives    Oxycodone-Acetaminophen Itching and Hives     Agitation  Itchy and agitation      Pentazocine Other (See Comments)     Other reaction(s): did not help with pain    Pollen Extract Sneezing    Prednisone Other (See Comments)     hypertension  Tolerates steroidal inhalers  Tolerates steroidal inhalers      Pregabalin Other (See Comments)     Pt stated she is unsure why she can't take, just knows she cant take the med.  agitation      Prochlorperazine Other (See Comments)     Extreme agitation  Extreme agitation      Rofecoxib Other (See Comments)     Other reaction(s): ineffective    Sertraline Other (See Comments)     agitation  agitation      Tapentadol GI Intolerance    Amiloride Rash     Other reaction(s): hypotensive    Celecoxib Rash     Pt stated she is unsure why she can't take, just knows she cant take the med.      Clonazepam Rash    Penicillins Hives and Rash       Physical Exam:    /54   Pulse 67   Ht 4' 11\" (1.499 m)   Wt 108 kg (237 lb)   BMI 47.87 kg/m²     Constitutional:normal, " well developed, well nourished, alert, in no distress and non-toxic and no overt pain behavior.  Eyes:anicteric  HEENT:grossly intact  Neck:supple, symmetric, trachea midline and no masses   Pulmonary:even and unlabored  Cardiovascular:No edema or pitting edema present  Skin:Normal without rashes or lesions and well hydrated  Psychiatric:Mood and affect appropriate  Neurologic:Cranial Nerves II-XII grossly intact  Musculoskeletal:antalgic and ambulates with a walker.  Right lateral hip tender to palpation over the trochanteric flare.  Equivocal seated straight leg raise bilaterally.  Right cervical paraspinal trapezium musculature tender to palpation.  Full range of motion in all planes of the cervical spine however pain in the right arm reproduced with cervical flexion.  Left upper extremity strength 5 out of 5 in all muscle groups.  Right upper extremity strength 5 out of 5 with the exception of right deltoid which is 3 out of 5.  Sensation intact light touch in C5-T1 dermatomes bilaterally.  Positive Green's reflex on the right.  Positive Spurling's to the right      Imaging  MRI cervical spine without contrast    (Results Pending)   FL spine and pain procedure    (Results Pending)         Orders Placed This Encounter   Procedures    MRI cervical spine without contrast    FL spine and pain procedure

## 2024-11-06 ENCOUNTER — OFFICE VISIT (OUTPATIENT)
Age: 79
End: 2024-11-06
Payer: MEDICARE

## 2024-11-06 VITALS
TEMPERATURE: 98.6 F | SYSTOLIC BLOOD PRESSURE: 168 MMHG | HEIGHT: 58 IN | BODY MASS INDEX: 49.33 KG/M2 | RESPIRATION RATE: 16 BRPM | OXYGEN SATURATION: 98 % | HEART RATE: 81 BPM | WEIGHT: 235 LBS | DIASTOLIC BLOOD PRESSURE: 70 MMHG

## 2024-11-06 DIAGNOSIS — Z98.84 STATUS POST BARIATRIC SURGERY: ICD-10-CM

## 2024-11-06 DIAGNOSIS — Z86.14 HISTORY OF MRSA INFECTION: ICD-10-CM

## 2024-11-06 DIAGNOSIS — Z78.9 MEDICALLY COMPLEX PATIENT: ICD-10-CM

## 2024-11-06 DIAGNOSIS — Z13.29 THYROID DISORDER SCREENING: ICD-10-CM

## 2024-11-06 DIAGNOSIS — N18.31 STAGE 3A CHRONIC KIDNEY DISEASE (HCC): ICD-10-CM

## 2024-11-06 DIAGNOSIS — I10 HYPERTENSION, UNSPECIFIED TYPE: ICD-10-CM

## 2024-11-06 DIAGNOSIS — M48.061 SPINAL STENOSIS OF LUMBAR REGION, UNSPECIFIED WHETHER NEUROGENIC CLAUDICATION PRESENT: ICD-10-CM

## 2024-11-06 DIAGNOSIS — C18.7 MALIGNANT NEOPLASM OF SIGMOID COLON (HCC): ICD-10-CM

## 2024-11-06 DIAGNOSIS — Z00.00 ENCOUNTER FOR MEDICAL EXAMINATION TO ESTABLISH CARE: Primary | ICD-10-CM

## 2024-11-06 DIAGNOSIS — M46.1 INFLAMMATION OF SACROILIAC JOINT (HCC): ICD-10-CM

## 2024-11-06 DIAGNOSIS — Z23 ENCOUNTER FOR IMMUNIZATION: ICD-10-CM

## 2024-11-06 DIAGNOSIS — R26.9 NEUROLOGIC GAIT DYSFUNCTION: ICD-10-CM

## 2024-11-06 DIAGNOSIS — M76.822 POSTERIOR TIBIAL TENDON DYSFUNCTION, LEFT: ICD-10-CM

## 2024-11-06 DIAGNOSIS — Z23 NEEDS FLU SHOT: ICD-10-CM

## 2024-11-06 DIAGNOSIS — E53.8 B12 DEFICIENCY: ICD-10-CM

## 2024-11-06 DIAGNOSIS — Z13.220 LIPID SCREENING: ICD-10-CM

## 2024-11-06 DIAGNOSIS — Z13.1 SCREENING FOR DIABETES MELLITUS: ICD-10-CM

## 2024-11-06 PROCEDURE — 99205 OFFICE O/P NEW HI 60 MIN: CPT | Performed by: INTERNAL MEDICINE

## 2024-11-06 PROCEDURE — G0008 ADMIN INFLUENZA VIRUS VAC: HCPCS | Performed by: INTERNAL MEDICINE

## 2024-11-06 PROCEDURE — 90662 IIV NO PRSV INCREASED AG IM: CPT | Performed by: INTERNAL MEDICINE

## 2024-11-06 RX ORDER — LISINOPRIL 10 MG/1
TABLET ORAL
Qty: 45 TABLET | Refills: 5 | Status: SHIPPED | OUTPATIENT
Start: 2024-11-06 | End: 2024-11-11 | Stop reason: SDUPTHER

## 2024-11-06 RX ORDER — HYDROCHLOROTHIAZIDE 12.5 MG/1
12.5 TABLET ORAL DAILY
Qty: 30 TABLET | Refills: 5 | Status: SHIPPED | OUTPATIENT
Start: 2024-11-06 | End: 2024-11-11 | Stop reason: SDUPTHER

## 2024-11-06 NOTE — PROGRESS NOTES
Assessment/Plan:    Malignant neoplasm of sigmoid colon (HCC)  2020 patient underwent surgery while she was in [place].  Since then N.  ED       Problem List Items Addressed This Visit       Spinal stenosis of lumbar region    Inflammation of sacroiliac joint (HCC)    Posterior tibial tendon dysfunction, left    Malignant neoplasm of sigmoid colon (HCC)     2020 patient underwent surgery while she was in [place].  Since then N.  ED         Relevant Orders    CBC and differential    Stage 3a chronic kidney disease (HCC)    Relevant Medications    hydroCHLOROthiazide 12.5 mg tablet    Other Relevant Orders    PTH, intact     Other Visit Diagnoses       Encounter for medical examination to establish care    -  Primary    Medically complex patient        Hypertension, unspecified type        Relevant Medications    lisinopril (ZESTRIL) 10 mg tablet    hydroCHLOROthiazide 12.5 mg tablet    Status post bariatric surgery        Relevant Orders    Urinalysis with microscopic    Iron Panel (Includes Ferritin, Iron Sat%, Iron, and TIBC)    History of MRSA infection        Encounter for immunization        Relevant Orders    Fluzone High-Dose 0.5 mL IM (Completed)    Needs flu shot        Relevant Orders    Fluzone High-Dose 0.5 mL IM (Completed)    Thyroid disorder screening        Relevant Orders    TSH, 3rd generation with Free T4 reflex    Lipid screening        Relevant Orders    Lipid panel    Screening for diabetes mellitus        Relevant Orders    Comprehensive metabolic panel    Hemoglobin A1C    B12 deficiency        Relevant Orders    Vitamin B12    Neurologic gait dysfunction                  Subjective:      Patient ID: Patricia Mariscal is a 79 y.o. female.    HPI  This is a 79-year-old female here to establish care.  She moved here from [place] to be closer to her son.    She has complicated past medical history as discussed below.  Patient is high blood pressure but has been out of medication for several weeks  now.  Thankfully she does not report any chest pain shortness of breath lightheadedness. Never had a stroke or heart attack.  Patient had small bowel obstruction back in May.  She has history of cholecystectomy gastric bypass and mesh placement of the anterior abdominal wall.  She has history of colon mass removal years ago.  She tells me was complicated with MRSA infection of mesh and was placed on Bactrim that she has taken over last 20 years.  She also adds this was current with consultation with ID specialist.  Thankfully, it has not recurred.  Colonoscopy 2018 by Dr. Trudi Stinson hyperplastic polyp in sigmoid colon tubulovillous adenoma removed in the rectum    Patient has significant central and foraminal stenosis at cervical and lumbar area under care of Dr. Jade.  She ambulates with a walker.  She had a tibial fracture end of last year after a fall.  She has congenital hindfoot deformity she has been seen by Dr. Lachman that did not he will as seen on last x-ray in December.  She has significant ankle deformity but is able to compensate with the use of walker.  She has significant depression and anxiety and has been under care of psychiatry who has left the area.  She is in process of getting a psychiatrist and has 6 months wait.  I will be happy to fill in but she would need psychiatrist for long-term.    She is not interested in mammogram.  She had a colonoscopy Dr. Marco A Bolden couple of years ago.  Will try to get a copy of last procedure.  Follow-up lab studies will be ordered.  Patient was low on B12.  May need to give her parenteral B12.         The following portions of the patient's history were reviewed and updated as appropriate: allergies, current medications, past family history, past medical history, past social history, past surgical history, and problem list.    Review of Systems      Objective:      /70 (BP Location: Left arm, Patient Position: Sitting, Cuff Size: Large)   Pulse 81    "Temp 98.6 °F (37 °C) (Temporal)   Resp 16   Ht 4' 10\" (1.473 m)   Wt 107 kg (235 lb)   SpO2 98%   BMI 49.12 kg/m²          Physical Exam  Constitutional:       Comments: Morbidly obese   Cardiovascular:      Rate and Rhythm: Normal rate and regular rhythm.      Heart sounds: Normal heart sounds. No murmur heard.  Pulmonary:      Effort: Pulmonary effort is normal. No respiratory distress.      Breath sounds: Normal breath sounds. No wheezing or rales.   Abdominal:      General: Bowel sounds are normal. There is no distension.      Tenderness: There is no abdominal tenderness. There is no guarding.   Musculoskeletal:         General: Deformity (Left foot deformity) present.   Neurological:      Mental Status: She is alert and oriented to person, place, and time.      Gait: Gait abnormal (Walker assisted ambulation).                 "

## 2024-11-07 ENCOUNTER — TELEPHONE (OUTPATIENT)
Age: 79
End: 2024-11-07

## 2024-11-07 NOTE — TELEPHONE ENCOUNTER
Patient called, was in to see the provider yesterday 11/6 -  she could not remember the name of the medication she is currently taking - was advised to call in today with the name:    ATENOLOL  50mg  Takes 1 pill and a half a day      If the provider still recommends she take this medication; please send refill to pharmacy on file.    If provider decides to change this medication, please call patient to discuss further.

## 2024-11-07 NOTE — TELEPHONE ENCOUNTER
Pt was in for office visit on 11/6. Patient takes ATENOLOL 50mg 1 and a half pills a day.     Please advise if you would like patient to continue on medication. If so please send refill to pharmacy.     If medication is going to be changed patient would like a call to discuss further.

## 2024-11-08 NOTE — TELEPHONE ENCOUNTER
So this is confusing.  She was taking Atenolol 50 1.5 qd.  She could not remember that med when she came in so I believe you ordered Lisinopril for her instead.  The directions are the same.  Which medication do you want her to be on?  I was unable to leave a message for pt

## 2024-11-08 NOTE — TELEPHONE ENCOUNTER
Please advise that BP medication was not sent to the pharmacy. Please send lisinopril to [facility] pharmacy and confirm that patient will or will not be taking atenolol 50 mg. Please advise

## 2024-11-11 ENCOUNTER — TELEPHONE (OUTPATIENT)
Age: 79
End: 2024-11-11

## 2024-11-11 ENCOUNTER — TELEPHONE (OUTPATIENT)
Dept: LAB | Facility: HOSPITAL | Age: 79
End: 2024-11-11

## 2024-11-11 DIAGNOSIS — I10 HYPERTENSION, UNSPECIFIED TYPE: ICD-10-CM

## 2024-11-11 DIAGNOSIS — I10 PRIMARY HYPERTENSION: Primary | ICD-10-CM

## 2024-11-11 RX ORDER — ATENOLOL 50 MG/1
TABLET ORAL
Qty: 45 TABLET | Refills: 5 | Status: SHIPPED | OUTPATIENT
Start: 2024-11-11

## 2024-11-11 RX ORDER — LISINOPRIL 10 MG/1
TABLET ORAL
Qty: 30 TABLET | Refills: 5 | Status: SHIPPED | OUTPATIENT
Start: 2024-11-11

## 2024-11-11 RX ORDER — HYDROCHLOROTHIAZIDE 12.5 MG/1
12.5 TABLET ORAL DAILY
Qty: 30 TABLET | Refills: 5 | Status: SHIPPED | OUTPATIENT
Start: 2024-11-11

## 2024-11-11 NOTE — TELEPHONE ENCOUNTER
Caller: pt    Doctor: ashley    Reason for call: pt needs a lyft ride for her appt on 12-2-24. I will email practice admin.    Call back#: [phone]

## 2024-11-11 NOTE — TELEPHONE ENCOUNTER
Patient called the office requesting for the mobile lab outreach to contact her back to schedule. Please review and advise

## 2024-11-11 NOTE — TELEPHONE ENCOUNTER
Pt states she is taken Atenolol 50 mgs 1.5 q.d , Lisinopril 10 mgs qd, and HCTZ 12.5 q d.  She needs refills on all of these.  Atenolol is not on her list, she could not remember that one when she was in to see you.  She is completely out of them.  
26.6

## 2024-11-19 NOTE — TELEPHONE ENCOUNTER
Patient unsure which lab she wants to use for mobile lab. She is asking for help and recommendations.

## 2024-11-20 NOTE — TELEPHONE ENCOUNTER
Patient called again regarding mobile lab coming out to draw her blood work.  Patient does not have a preferred lab.  States that she was supposed to have this blood drawn prior to her next appointment on 11/25.    She has transportation set up through her insurance for her appointment on 11/25 so she is not able to get to the lab that day.    Please follow up with patient to advise.

## 2024-11-20 NOTE — TELEPHONE ENCOUNTER
Called pt to find out what's going on, she stated no one has contact her for her mobile lab appt. I called mobile lab at [phone] and lab was closed. They will be open tomorrow from 7am to 3pm. I will try calling again tomorrow.

## 2024-11-21 ENCOUNTER — TELEPHONE (OUTPATIENT)
Dept: LAB | Facility: HOSPITAL | Age: 79
End: 2024-11-21

## 2024-11-25 ENCOUNTER — OFFICE VISIT (OUTPATIENT)
Age: 79
End: 2024-11-25
Payer: MEDICARE

## 2024-11-25 VITALS
SYSTOLIC BLOOD PRESSURE: 128 MMHG | WEIGHT: 239 LBS | TEMPERATURE: 97.1 F | BODY MASS INDEX: 50.17 KG/M2 | DIASTOLIC BLOOD PRESSURE: 78 MMHG | RESPIRATION RATE: 16 BRPM | HEIGHT: 58 IN | HEART RATE: 64 BPM | OXYGEN SATURATION: 98 %

## 2024-11-25 DIAGNOSIS — Z00.00 MEDICARE ANNUAL WELLNESS VISIT, SUBSEQUENT: Primary | ICD-10-CM

## 2024-11-25 DIAGNOSIS — Z86.14 HISTORY OF MRSA INFECTION: ICD-10-CM

## 2024-11-25 DIAGNOSIS — F32.1 CURRENT MODERATE EPISODE OF MAJOR DEPRESSIVE DISORDER WITHOUT PRIOR EPISODE (HCC): ICD-10-CM

## 2024-11-25 DIAGNOSIS — I10 HYPERTENSION, UNSPECIFIED TYPE: ICD-10-CM

## 2024-11-25 DIAGNOSIS — M76.822 POSTERIOR TIBIAL TENDON DYSFUNCTION, LEFT: ICD-10-CM

## 2024-11-25 DIAGNOSIS — Z79.2 CHRONIC ANTIBIOTIC SUPPRESSION: ICD-10-CM

## 2024-11-25 DIAGNOSIS — M48.062 SPINAL STENOSIS OF LUMBAR REGION WITH NEUROGENIC CLAUDICATION: ICD-10-CM

## 2024-11-25 DIAGNOSIS — C18.7 MALIGNANT NEOPLASM OF SIGMOID COLON (HCC): ICD-10-CM

## 2024-11-25 DIAGNOSIS — Z23 NEED FOR COVID-19 VACCINE: ICD-10-CM

## 2024-11-25 PROCEDURE — 91320 SARSCV2 VAC 30MCG TRS-SUC IM: CPT

## 2024-11-25 PROCEDURE — 90480 ADMN SARSCOV2 VAC 1/ONLY CMP: CPT

## 2024-11-25 PROCEDURE — 99214 OFFICE O/P EST MOD 30 MIN: CPT | Performed by: INTERNAL MEDICINE

## 2024-11-25 PROCEDURE — G0439 PPPS, SUBSEQ VISIT: HCPCS | Performed by: INTERNAL MEDICINE

## 2024-11-25 NOTE — PATIENT INSTRUCTIONS
Medicare Preventive Visit Patient Instructions  Thank you for completing your Welcome to Medicare Visit or Medicare Annual Wellness Visit today. Your next wellness visit will be due in one year (11/26/2025).  The screening/preventive services that you may require over the next 5-10 years are detailed below. Some tests may not apply to you based off risk factors and/or age. Screening tests ordered at today's visit but not completed yet may show as past due. Also, please note that scanned in results may not display below.  Preventive Screenings:  Service Recommendations Previous Testing/Comments   Colorectal Cancer Screening  * Colonoscopy    * Fecal Occult Blood Test (FOBT)/Fecal Immunochemical Test (FIT)  * Fecal DNA/Cologuard Test  * Flexible Sigmoidoscopy Age: 45-75 years old   Colonoscopy: every 10 years (may be performed more frequently if at higher risk)  OR  FOBT/FIT: every 1 year  OR  Cologuard: every 3 years  OR  Sigmoidoscopy: every 5 years  Screening may be recommended earlier than age 45 if at higher risk for colorectal cancer. Also, an individualized decision between you and your healthcare provider will decide whether screening between the ages of 76-85 would be appropriate. Colonoscopy: Not on file  FOBT/FIT: Not on file  Cologuard: Not on file  Sigmoidoscopy: Not on file          Breast Cancer Screening Age: 40+ years old  Frequency: every 1-2 years  Not required if history of left and right mastectomy Mammogram: Not on file        Cervical Cancer Screening Between the ages of 21-29, pap smear recommended once every 3 years.   Between the ages of 30-65, can perform pap smear with HPV co-testing every 5 years.   Recommendations may differ for women with a history of total hysterectomy, cervical cancer, or abnormal pap smears in past. Pap Smear: Not on file        Hepatitis C Screening Once for adults born between 1945 and 1965  More frequently in patients at high risk for Hepatitis C Hep C Antibody:  Not on file        Diabetes Screening 1-2 times per year if you're at risk for diabetes or have pre-diabetes Fasting glucose: 95 mg/dL (5/26/2024)  A1C: 5.1 % (9/27/2023)      Cholesterol Screening Once every 5 years if you don't have a lipid disorder. May order more often based on risk factors. Lipid panel: 09/27/2023          Other Preventive Screenings Covered by Medicare:  Abdominal Aortic Aneurysm (AAA) Screening: covered once if your at risk. You're considered to be at risk if you have a family history of AAA.  Lung Cancer Screening: covers low dose CT scan once per year if you meet all of the following conditions: (1) Age 55-77; (2) No signs or symptoms of lung cancer; (3) Current smoker or have quit smoking within the last 15 years; (4) You have a tobacco smoking history of at least 20 pack years (packs per day multiplied by number of years you smoked); (5) You get a written order from a healthcare provider.  Glaucoma Screening: covered annually if you're considered high risk: (1) You have diabetes OR (2) Family history of glaucoma OR (3)  aged 50 and older OR (4)  American aged 65 and older  Osteoporosis Screening: covered every 2 years if you meet one of the following conditions: (1) You're estrogen deficient and at risk for osteoporosis based off medical history and other findings; (2) Have a vertebral abnormality; (3) On glucocorticoid therapy for more than 3 months; (4) Have primary hyperparathyroidism; (5) On osteoporosis medications and need to assess response to drug therapy.   Last bone density test (DXA Scan): Not on file.  HIV Screening: covered annually if you're between the age of 15-65. Also covered annually if you are younger than 15 and older than 65 with risk factors for HIV infection. For pregnant patients, it is covered up to 3 times per pregnancy.    Immunizations:  Immunization Recommendations   Influenza Vaccine Annual influenza vaccination during flu season is  recommended for all persons aged >= 6 months who do not have contraindications   Pneumococcal Vaccine   * Pneumococcal conjugate vaccine = PCV13 (Prevnar 13), PCV15 (Vaxneuvance), PCV20 (Prevnar 20)  * Pneumococcal polysaccharide vaccine = PPSV23 (Pneumovax) Adults 19-63 yo with certain risk factors or if 65+ yo  If never received any pneumonia vaccine: recommend Prevnar 20 (PCV20)  Give PCV20 if previously received 1 dose of PCV13 or PPSV23   Hepatitis B Vaccine 3 dose series if at intermediate or high risk (ex: diabetes, end stage renal disease, liver disease)   Respiratory syncytial virus (RSV) Vaccine - COVERED BY MEDICARE PART D  * RSVPreF3 (Arexvy) CDC recommends that adults 60 years of age and older may receive a single dose of RSV vaccine using shared clinical decision-making (SCDM)   Tetanus (Td) Vaccine - COST NOT COVERED BY MEDICARE PART B Following completion of primary series, a booster dose should be given every 10 years to maintain immunity against tetanus. Td may also be given as tetanus wound prophylaxis.   Tdap Vaccine - COST NOT COVERED BY MEDICARE PART B Recommended at least once for all adults. For pregnant patients, recommended with each pregnancy.   Shingles Vaccine (Shingrix) - COST NOT COVERED BY MEDICARE PART B  2 shot series recommended in those 19 years and older who have or will have weakened immune systems or those 50 years and older     Health Maintenance Due:      Topic Date Due   • Hepatitis C Screening  Never done   • Colorectal Cancer Screening  Discontinued     Immunizations Due:      Topic Date Due   • COVID-19 Vaccine (6 - 2024-25 season) 09/01/2024     Advance Directives   What are advance directives?  Advance directives are legal documents that state your wishes and plans for medical care. These plans are made ahead of time in case you lose your ability to make decisions for yourself. Advance directives can apply to any medical decision, such as the treatments you want, and if  you want to donate organs.   What are the types of advance directives?  There are many types of advance directives, and each state has rules about how to use them. You may choose a combination of any of the following:  Living will:  This is a written record of the treatment you want. You can also choose which treatments you do not want, which to limit, and which to stop at a certain time. This includes surgery, medicine, IV fluid, and tube feedings.   Durable power of  for healthcare (DPAHC):  This is a written record that states who you want to make healthcare choices for you when you are unable to make them for yourself. This person, called a proxy, is usually a family member or a friend. You may choose more than 1 proxy.  Do not resuscitate (DNR) order:  A DNR order is used in case your heart stops beating or you stop breathing. It is a request not to have certain forms of treatment, such as CPR. A DNR order may be included in other types of advance directives.  Medical directive:  This covers the care that you want if you are in a coma, near death, or unable to make decisions for yourself. You can list the treatments you want for each condition. Treatment may include pain medicine, surgery, blood transfusions, dialysis, IV or tube feedings, and a ventilator (breathing machine).  Values history:  This document has questions about your views, beliefs, and how you feel and think about life. This information can help others choose the care that you would choose.  Why are advance directives important?  An advance directive helps you control your care. Although spoken wishes may be used, it is better to have your wishes written down. Spoken wishes can be misunderstood, or not followed. Treatments may be given even if you do not want them. An advance directive may make it easier for your family to make difficult choices about your care.   Urinary Incontinence   Urinary incontinence (UI)  is when you lose  control of your bladder. UI develops because your bladder cannot store or empty urine properly. The 3 most common types of UI are stress incontinence, urge incontinence, or both.  Medicines:   May be given to help strengthen your bladder control. Report any side effects of medication to your healthcare provider.  Do pelvic muscle exercises often:  Your pelvic muscles help you stop urinating. Squeeze these muscles tight for 5 seconds, then relax for 5 seconds. Gradually work up to squeezing for 10 seconds. Do 3 sets of 15 repetitions a day, or as directed. This will help strengthen your pelvic muscles and improve bladder control.  Train your bladder:  Go to the bathroom at set times, such as every 2 hours, even if you do not feel the urge to go. You can also try to hold your urine when you feel the urge to go. For example, hold your urine for 5 minutes when you feel the urge to go. As that becomes easier, hold your urine for 10 minutes.   Self-care:   Keep a UI record.  Write down how often you leak urine and how much you leak. Make a note of what you were doing when you leaked urine.  Drink liquids as directed. You may need to limit the amount of liquid you drink to help control your urine leakage. Do not drink any liquid right before you go to bed. Limit or do not have drinks that contain caffeine or alcohol.   Prevent constipation.  Eat a variety of high-fiber foods. Good examples are high-fiber cereals, beans, vegetables, and whole-grain breads. Walking is the best way to trigger your intestines to have a bowel movement.  Exercise regularly and maintain a healthy weight.  Weight loss and exercise will decrease pressure on your bladder and help you control your leakage.   Use a catheter as directed  to help empty your bladder. A catheter is a tiny, plastic tube that is put into your bladder to drain your urine.   Go to behavior therapy as directed.  Behavior therapy may be used to help you learn to control your  urge to urinate.    Weight Management   Why it is important to manage your weight:  Being overweight increases your risk of health conditions such as heart disease, high blood pressure, type 2 diabetes, and certain types of cancer. It can also increase your risk for osteoarthritis, sleep apnea, and other respiratory problems. Aim for a slow, steady weight loss. Even a small amount of weight loss can lower your risk of health problems.  How to lose weight safely:  A safe and healthy way to lose weight is to eat fewer calories and get regular exercise. You can lose up about 1 pound a week by decreasing the number of calories you eat by 500 calories each day.   Healthy meal plan for weight management:  A healthy meal plan includes a variety of foods, contains fewer calories, and helps you stay healthy. A healthy meal plan includes the following:  Eat whole-grain foods more often.  A healthy meal plan should contain fiber. Fiber is the part of grains, fruits, and vegetables that is not broken down by your body. Whole-grain foods are healthy and provide extra fiber in your diet. Some examples of whole-grain foods are whole-wheat breads and pastas, oatmeal, brown rice, and bulgur.  Eat a variety of vegetables every day.  Include dark, leafy greens such as spinach, kale, hal greens, and mustard greens. Eat yellow and orange vegetables such as carrots, sweet potatoes, and winter squash.   Eat a variety of fruits every day.  Choose fresh or canned fruit (canned in its own juice or light syrup) instead of juice. Fruit juice has very little or no fiber.  Eat low-fat dairy foods.  Drink fat-free (skim) milk or 1% milk. Eat fat-free yogurt and low-fat cottage cheese. Try low-fat cheeses such as mozzarella and other reduced-fat cheeses.  Choose meat and other protein foods that are low in fat.  Choose beans or other legumes such as split peas or lentils. Choose fish, skinless poultry (chicken or turkey), or lean cuts of red  meat (beef or pork). Before you cook meat or poultry, cut off any visible fat.   Use less fat and oil.  Try baking foods instead of frying them. Add less fat, such as margarine, sour cream, regular salad dressing and mayonnaise to foods. Eat fewer high-fat foods. Some examples of high-fat foods include french fries, doughnuts, ice cream, and cakes.  Eat fewer sweets.  Limit foods and drinks that are high in sugar. This includes candy, cookies, regular soda, and sweetened drinks.  Exercise:  Exercise at least 30 minutes per day on most days of the week. Some examples of exercise include walking, biking, dancing, and swimming. You can also fit in more physical activity by taking the stairs instead of the elevator or parking farther away from stores. Ask your healthcare provider about the best exercise plan for you.      © Copyright [employer] 2018 Information is for End User's use only and may not be sold, redistributed or otherwise used for commercial purposes. All illustrations and images included in CareNotes® are the copyrighted property of [employer] Inc[employer] or [employer]

## 2024-11-25 NOTE — PROGRESS NOTES
Name: Patricia Mariscal      : 1945      MRN: [id number]  Encounter Provider: Swetha Mcgowan MD  Encounter Date: 2024   Encounter department: [facility] PRIMARY CARE    Assessment & Plan  Medicare annual wellness visit, subsequent         Hypertension, unspecified type  Controlled continue current regimen       Spinal stenosis of lumbar region with neurogenic claudication  ambulation assisted with walker       Malignant neoplasm of sigmoid colon (HCC)  S/p resection bed last colonoscopy  with tubular villous adenoma.       Posterior tibial tendon dysfunction, left  Patient follow-up with Dr. Lachman     History of MRSA infection  On chronic Bactrim.  ID referral   Orders:    Ambulatory Referral to Infectious Disease; Future    Chronic antibiotic suppression    Orders:    Ambulatory Referral to Infectious Disease; Future    Current moderate episode of major depressive disorder without prior episode (HCC)  Depression Screening Follow-up Plan: Patient's depression screening was positive with a PHQ-2 score of 6. Their PHQ-9 score was 15. Patient with underlying depression and was advised to continue current medications as prescribed.           Depression Screening and Follow-up Plan: Patient's depression screening was positive with a PHQ-2 score of 6. Their PHQ-9 score was 15. Patient with underlying depression and was advised to continue current medications as prescribed.     Urinary Incontinence Plan of Care: counseling topics discussed: preventing constipation.       Preventive health issues were discussed with patient, and age appropriate screening tests were ordered as noted in patient's After Visit Summary. Personalized health advice and appropriate referrals for health education or preventive services given if needed, as noted in patient's After Visit Summary.    History of Present Illness     HPI   Patient with extensive past medical history is here to follow-up on Medicare visit.  She  did not get blood work done.  Blood pressure is much better since she started taking her medication.  She was on Bactrim for MRSA infection in her mesh.  She is not sure how much of mesh was removed.  Infection disease specialist will be consulted for ongoing need for taking Bactrim.  Form for waiver was also filled out today.  Patient would be given COVID vaccination.  She will follow-up for Prevnar 20.    She does not want mammogram  Patient Care Team:  Swetha Mcgowan MD as PCP - General (Internal Medicine)    Review of Systems  Medical History Reviewed by provider this encounter:       Annual Wellness Visit Questionnaire   Patricia is here for her Subsequent Wellness visit.     Health Risk Assessment:   Patient rates overall health as good. Patient feels that their physical health rating is same. Patient is satisfied with their life. Eyesight was rated as same. Hearing was rated as same. Patient feels that their emotional and mental health rating is slightly worse. Patients states they are sometimes angry. Patient states they are often unusually tired/fatigued. Pain experienced in the last 7 days has been a lot. Patient's pain rating has been 9/10. Patient states that she has experienced no weight loss or gain in last 6 months.     Depression Screening:   PHQ-2 Score: 6  PHQ-9 Score: 15      Fall Risk Screening:   In the past year, patient has experienced: history of falling in past year    Injured during fall?: Yes    Feels unsteady when standing or walking?: Yes    Worried about falling?: Yes      Urinary Incontinence Screening:   Patient has leaked urine accidently in the last six months.     Home Safety:  Patient has trouble with stairs inside or outside of their home. Patient has working smoke alarms and has working carbon monoxide detector. Home safety hazards include: none.     Nutrition:   Current diet is Regular.     Medications:   Patient is currently taking over-the-counter supplements. OTC medications  include: see medication list. Patient is able to manage medications.     Activities of Daily Living (ADLs)/Instrumental Activities of Daily Living (IADLs):   Walk and transfer into and out of bed and chair?: No  Dress and groom yourself?: Yes    Bathe or shower yourself?: Yes    Feed yourself? Yes  Do your laundry/housekeeping?: Yes  Manage your money, pay your bills and track your expenses?: Yes  Make your own meals?: Yes    Do your own shopping?: No    Previous Hospitalizations:   Any hospitalizations or ED visits within the last 12 months?: Yes    How many hospitalizations have you had in the last year?: 1-2    Advance Care Planning:   Living will: Yes    Durable POA for healthcare: Yes    Advanced directive: Yes      Cognitive Screening:   Provider or family/friend/caregiver concerned regarding cognition?: No    PREVENTIVE SCREENINGS      Cardiovascular Screening:    General: Screening Current      Diabetes Screening:     General: Screening Current    Due for: Blood Glucose      Colorectal Cancer Screening:     General: History Colorectal Cancer and Screening Current    Due for: Colonoscopy - High Risk      Breast Cancer Screening:     General: Patient Declines      Cervical Cancer Screening:    General: Screening Not Indicated      Osteoporosis Screening:      Due for: DXA Axial      Abdominal Aortic Aneurysm (AAA) Screening:        General: Screening Not Indicated      Lung Cancer Screening:     General: Screening Not Indicated    Screening, Brief Intervention, and Referral to Treatment (SBIRT)    Screening  Typical number of drinks in a day: 0  Typical number of drinks in a week: 0  Interpretation: Low risk drinking behavior.    AUDIT-C Screenin) How often did you have a drink containing alcohol in the past year? never  2) How many drinks did you have on a typical day when you were drinking in the past year? 0  3) How often did you have 6 or more drinks on one occasion in the past year? never    AUDIT-C  "Score: 0  Interpretation: Score 0-2 (female): Negative screen for alcohol misuse    Single Item Drug Screening:  How often have you used an illegal drug (including marijuana) or a prescription medication for non-medical reasons in the past year? never    Single Item Drug Screen Score: 0  Interpretation: Negative screen for possible drug use disorder    Social Drivers of Health     Food Insecurity: Food Insecurity Present (11/25/2024)    Hunger Vital Sign     Worried About Running Out of Food in the Last Year: Sometimes true     Ran Out of Food in the Last Year: Sometimes true   Transportation Needs: Unmet Transportation Needs (11/25/2024)    PRAPARE - Transportation     Lack of Transportation (Medical): Yes     Lack of Transportation (Non-Medical): Yes   Housing Stability: Unknown (11/25/2024)    Housing Stability Vital Sign     Unable to Pay for Housing in the Last Year: No     Homeless in the Last Year: No   Utilities: Not At Risk (11/25/2024)    [facility] Utilities     Threatened with loss of utilities: No     No results found.    Objective   /78 (BP Location: Left arm, Patient Position: Sitting, Cuff Size: Large)   Pulse 64   Temp (!) 97.1 °F (36.2 °C) (Temporal)   Resp 16   Ht 4' 10\" (1.473 m)   Wt 108 kg (239 lb)   SpO2 98%   BMI 49.95 kg/m²     Physical Exam  Constitutional:       Appearance: She is obese.   Neck:      Vascular: No carotid bruit.   Cardiovascular:      Rate and Rhythm: Normal rate and regular rhythm.      Heart sounds: Normal heart sounds. No murmur heard.  Pulmonary:      Effort: Pulmonary effort is normal. No respiratory distress.      Breath sounds: Normal breath sounds. No wheezing or rales.   Musculoskeletal:      Right lower leg: Edema present.      Left lower leg: Edema present.   Lymphadenopathy:      Cervical: No cervical adenopathy.   Neurological:      Mental Status: She is alert.   Psychiatric:         Mood and Affect: Mood normal.         Behavior: Behavior normal. "       Administrative Statements   I have spent a total time of 40 minutes in caring for this patient on the day of the visit/encounter including Risks and benefits of tx options, Instructions for management, Patient and family education, Importance of tx compliance, Risk factor reductions, Impressions, Counseling / Coordination of care, Documenting in the medical record, Reviewing / ordering tests, medicine, procedures  , and Obtaining or reviewing history  .

## 2024-11-25 NOTE — ASSESSMENT & PLAN NOTE
Depression Screening Follow-up Plan: Patient's depression screening was positive with a PHQ-2 score of 6. Their PHQ-9 score was 15. Patient with underlying depression and was advised to continue current medications as prescribed.

## 2024-11-26 ENCOUNTER — TELEPHONE (OUTPATIENT)
Age: 79
End: 2024-11-26

## 2024-11-26 DIAGNOSIS — F32.A DEPRESSION, UNSPECIFIED DEPRESSION TYPE: Primary | ICD-10-CM

## 2024-11-27 RX ORDER — VENLAFAXINE HYDROCHLORIDE 150 MG/1
150 CAPSULE, EXTENDED RELEASE ORAL DAILY
Qty: 30 CAPSULE | Refills: 3 | Status: SHIPPED | OUTPATIENT
Start: 2024-11-27

## 2024-11-27 RX ORDER — VENLAFAXINE 75 MG/1
75 TABLET ORAL DAILY
Qty: 30 TABLET | Refills: 3 | Status: SHIPPED | OUTPATIENT
Start: 2024-11-27

## 2024-11-27 NOTE — TELEPHONE ENCOUNTER
Pt is still taking Bactrim for her past MRSA infection. Her PCP is wanting her to follow up with ID to stop the medication. She had surgery in the past and Mesh and she was developing MRSA cysts that required her to be on Bactrim. She states she had 5 overall, she had surgery to take them out and also replace the mesh. But she states that some couldn't be replaced and she had wound vacs and the MRSA and mesh was deep and the hole she had from the infection. Things seemed to have cleared up and she hasn't had any issues for about 4-5 years now once the mesh was removed. Pt states that she moved at one point and then her PCP took over the Bactrim script and then she moved her and her PCP kept her on the Bactrim and she has been taking the Bactrim since. Her initial ID provider from [facility] informed her that she would be on Bactrim for life. Pt states that she recently has stopped taking the Bactrim to see what would happen from her 1st visit until her last visit with her PCP.       The whole reason for the consult is to see if the patient is needing to be on lifelong suppression or can stop taking Bactrim.

## 2024-12-02 ENCOUNTER — OFFICE VISIT (OUTPATIENT)
Dept: PAIN MEDICINE | Facility: CLINIC | Age: 79
End: 2024-12-02
Payer: MEDICARE

## 2024-12-02 ENCOUNTER — PATIENT OUTREACH (OUTPATIENT)
Dept: CASE MANAGEMENT | Facility: OTHER | Age: 79
End: 2024-12-02

## 2024-12-02 VITALS
WEIGHT: 233 LBS | BODY MASS INDEX: 48.91 KG/M2 | HEART RATE: 59 BPM | DIASTOLIC BLOOD PRESSURE: 84 MMHG | SYSTOLIC BLOOD PRESSURE: 158 MMHG | HEIGHT: 58 IN

## 2024-12-02 DIAGNOSIS — Z13.9 ENCOUNTER FOR SCREENING INVOLVING SOCIAL DETERMINANTS OF HEALTH (SDOH): Primary | ICD-10-CM

## 2024-12-02 DIAGNOSIS — M65.321 ACQUIRED TRIGGER FINGER OF RIGHT INDEX FINGER: ICD-10-CM

## 2024-12-02 DIAGNOSIS — M54.12 CERVICAL RADICULOPATHY: Primary | ICD-10-CM

## 2024-12-02 DIAGNOSIS — M54.16 LUMBAR RADICULOPATHY: ICD-10-CM

## 2024-12-02 PROCEDURE — 99214 OFFICE O/P EST MOD 30 MIN: CPT | Performed by: NURSE PRACTITIONER

## 2024-12-02 NOTE — PROGRESS NOTES
Assessment:  1. Cervical radiculopathy    2. Lumbar radiculopathy    3. Acquired trigger finger of right index finger        Plan:  Patient will be scheduled for repeat C6-7 cervical epidural steroid injection to address the inflammatory component of the patient's pain.  2 weeks following, patient will also be scheduled for a bilateral L5 TFESI.  Complete risks and benefits including bleeding, infection, tissue reaction, nerve injury and allergic reaction were discussed. The patient was agreeable and verbalized an understanding  Patient may continue tramadol, and methocarbamol as prescribed  Patient may continue Tylenol as needed  Patient states she is experiencing right index finger trigger finger.  Referral to orthopedics provided today  Will avoid NSAIDs secondary to history of gastric bypass  Follow-up pending results of procedures or sooner if needed    History of Present Illness:    The patient is a 79 y.o. female with a history of fibromyalgia l and gastric bypass surgery ast seen on 1/31/2024 who presents for a follow up office visit in regards to chronic neck pain that radiates into the right upper extremity, and low back pain that is into the bilateral lateral hips and posterior lateral aspect of the right lower extremity with associated numbness.  She denies bowel or bladder incontinence or saddle anesthesia.  Patient has had greater than 60 or 70% relief of her pain from previous CLAY, right L3 and L4 TFESI, right GTB and right intra-articular shoulder injections.  She states she was considering her right shoulder replacement however never pursued at this time    The patient rates her pain a 9 out of 10 on the numeric pain rating scale. Pain is constant and is described as burning, dull aching, sharp, cramping, shooting and numbness    I have personally reviewed and/or updated the patient's past medical history, past surgical history, family history, social history, current medications, allergies, and  vital signs today.       Review of Systems:    Review of Systems   Respiratory:  Negative for shortness of breath.    Cardiovascular:  Negative for chest pain.   Gastrointestinal:  Negative for constipation, diarrhea, nausea and vomiting.   Musculoskeletal:  Positive for back pain and gait problem. Negative for arthralgias, joint swelling and myalgias.   Skin:  Negative for rash.   Neurological:  Negative for dizziness, seizures and weakness.   All other systems reviewed and are negative.        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    Depression     History of transfusion     Hypertension     MRSA (methicillin resistant Staphylococcus aureus)     Pneumonia        Past Surgical History:   Procedure Laterality Date    APPENDECTOMY       SECTION      CHOLECYSTECTOMY      COLON SURGERY      DILATION AND CURETTAGE OF UTERUS      EXTRACTION, ERUPTED TOOTH REQUIRING REMOVAL OF BONE AND/OR SECTIONING OF TOOTH, AND INCLUDING ELEVATION OF MUCOPERIOSTEAL FLAP IF INDICATED N/A 2024    Procedure: EXTRACTION TEETH MULTIPLE -2,3,7,10, 14,19,29,30;  Surgeon: Livan Trotter DMD;  Location: [facility];  Service: Maxillofacial    EYE SURGERY      GASTRIC BYPASS      HERNIA REPAIR      several    HYSTERECTOMY      JOINT REPLACEMENT         History reviewed. No pertinent family history.    Social History     Occupational History    Not on file   Tobacco Use    Smoking status: Never    Smokeless tobacco: Never   Vaping Use    Vaping status: Never Used   Substance and Sexual Activity    Alcohol use: Never    Drug use: Never    Sexual activity: Not Currently         Current Outpatient Medications:     atenolol (TENORMIN) 50 mg tablet, One and half tablet a day, Disp: 45 tablet, Rfl: 5    Cholecalciferol 125 MCG (5000 UT) capsule, Take 10,000 Units by mouth every 30 (thirty) days, Disp: , Rfl:      diphenhydrAMINE (Benadryl Allergy) 25 mg tablet, Take by mouth if needed, Disp: , Rfl:     fluticasone (Flonase Allergy Relief) 50 mcg/act nasal spray, into each nostril, Disp: , Rfl:     hydroCHLOROthiazide 12.5 mg tablet, Take 1 tablet (12.5 mg total) by mouth daily, Disp: 30 tablet, Rfl: 5    latanoprost (XALATAN) 0.005 % ophthalmic solution, Apply 1 drop to eye, Disp: , Rfl:     lisinopril (ZESTRIL) 10 mg tablet, Take 1 pill a day, Disp: 30 tablet, Rfl: 5    LORazepam (ATIVAN) 1 mg tablet, Take 1 mg by mouth 3 (three) times a day as needed, Disp: , Rfl:     methocarbamol (ROBAXIN) 500 mg tablet, Take 500 mg by mouth 2 (two) times a day as needed, Disp: , Rfl:     ondansetron (ZOFRAN) 4 mg tablet, Take 1 tablet (4 mg total) by mouth every 6 (six) hours as needed for nausea or vomiting, Disp: 12 tablet, Rfl: 0    sulfamethoxazole-trimethoprim (BACTRIM DS) 800-160 mg per tablet, 1 tablet every 12 (twelve) hours, Disp: , Rfl:     traMADol (ULTRAM) 50 mg tablet, Take 50 mg by mouth every 6 (six) hours as needed, Disp: , Rfl:     venlafaxine (EFFEXOR) 75 mg tablet, Take 1 tablet (75 mg total) by mouth in the morning, Disp: 30 tablet, Rfl: 3    venlafaxine (EFFEXOR-XR) 150 mg 24 hr capsule, Take 1 capsule (150 mg total) by mouth daily, Disp: 30 capsule, Rfl: 3    Allergies   Allergen Reactions    Alprazolam Other (See Comments) and Hives     ineffective  ineffective      Azithromycin Hives    Cephalexin Hives    Codeine Hyperactivity, Other (See Comments) and Hives     Hyperactivity and agitation  Hyperactivity and agitation      Diclofenac GI Intolerance    Diclofenac Sodium Itching    Duloxetine Other (See Comments) and Hives     Dizziness and confusion  Dizziness and confusion      Duloxetine Hcl Dizziness and Other (See Comments)    Erythromycin Hives    Fentanyl Itching    Hydrochlorothiazide Other (See Comments)     Other reaction(s): hypotensive    Hydromorphone Hives    Ibuprofen Other (See Comments) and GI  "Intolerance     Stomach pains  Stomach pains      Iodinated Contrast Media Hives    Iodine - Food Allergy Itching     CT dye    Latex Itching    Medical Tape Itching and Other (See Comments)      Paper tape - skin tears off      Meloxicam GI Intolerance    Methadone Other (See Comments) and Hives     agitation  agitation      Methylprednisolone Other (See Comments)     Raises BP  Raises blood pressure      Milnacipran Itching    Mirtazapine Other (See Comments) and Hives     hallucinations  hallucinations      Molds & Smuts Sneezing    Morphine Other (See Comments)     Agitation  Agitation if on for more than a few days  Agitation if on for more than a few days      Naloxone Other (See Comments)    Naproxen Other (See Comments) and GI Bleeding     Stomach pains  Stomach pain      Nefazodone Hives    Nitrofurantoin Hives    Oxycodone-Acetaminophen Itching and Hives     Agitation  Itchy and agitation      Pentazocine Other (See Comments)     Other reaction(s): did not help with pain    Pollen Extract Sneezing    Prednisone Other (See Comments)     hypertension  Tolerates steroidal inhalers  Tolerates steroidal inhalers      Pregabalin Other (See Comments)     Pt stated she is unsure why she can't take, just knows she cant take the med.  agitation      Prochlorperazine Other (See Comments)     Extreme agitation  Extreme agitation      Rofecoxib Other (See Comments)     Other reaction(s): ineffective    Sertraline Other (See Comments)     agitation  agitation      Tapentadol GI Intolerance    Amiloride Rash     Other reaction(s): hypotensive    Celecoxib Rash     Pt stated she is unsure why she can't take, just knows she cant take the med.      Clonazepam Rash    Penicillins Hives and Rash       Physical Exam:    /84   Pulse 59   Ht 4' 10\" (1.473 m)   Wt 106 kg (233 lb)   BMI 48.70 kg/m²     Constitutional:normal, well developed, well nourished, alert, in no distress and non-toxic and no overt pain " behavior.  Eyes:anicteric  HEENT:grossly intact  Neck:supple, symmetric, trachea midline and no masses   Pulmonary:even and unlabored  Cardiovascular:No edema or pitting edema present  Skin:Normal without rashes or lesions and well hydrated  Psychiatric:Mood and affect appropriate  Neurologic:Cranial Nerves II-XII grossly intact  Musculoskeletal:antalgic gait, ambulates with a rolling walker.  Positive Spurling's to the right.  Significantly reduced range of motion of the right shoulder in all planes.  bilateral trochanteric bursa's tender to palpation.  Equivocal seated straight leg raise      Imaging  FL spine and pain procedure    (Results Pending)   FL spine and pain procedure    (Results Pending)         Orders Placed This Encounter   Procedures    FL spine and pain procedure    FL spine and pain procedure    Ambulatory referral to Orthopedic Surgery

## 2024-12-02 NOTE — PROGRESS NOTES
Scotty MARSHALL CM received IB from lead OP SW CM regarding SDOH referral. Pt seen by PCP on 11/25 and reported needs related to food insecurity. Pt's depression screening was positive and she was encouraged to continue current medications as prescribed. Pt does not have any previous OP JOANNA CM outreach.    Scotty MARSHALL CM placed call to pt to introduce self and offer assistance with SDOH needs. Pt did not answer and does not have vm set up. JOANNA LEUNG was unable to leave a message. Another outreach will be made within one week to f/u on referral.

## 2024-12-04 ENCOUNTER — PATIENT OUTREACH (OUTPATIENT)
Dept: CASE MANAGEMENT | Facility: OTHER | Age: 79
End: 2024-12-04

## 2024-12-04 NOTE — PROGRESS NOTES
JOANNA LEUNG placed second outreach call to patient. JOANNA LEUNG introduced self, explained role and reasoning for outreach. Patient stated that she was recently taken off of Medicaid and was told she earns too much. JOANNA LEUNG asked patient if she wanted JOANNA LEUNG to see if patient would be eligible for food stamps. Patient declined stating that she will not qualify. JOANNA LEUNG offered to provide information on food pantries and patient stated that she was connected with a  food pantry in the past who would deliver, but due to phone issues she lost contact with them. Patient also stated that she had Meals on Wheels in the past, but got food poisoning so she stopped the service.    Patient stated that she applied for Waiver and recently found out that she was approved for services, but has not heard anything in a little while. JOANNA DAUGHERTY encouraged patient to outreach Waiver regarding this.    JOANNA LEUNG reviewed transportation. Patient stated she had used insurance transportation benefit recently, but has not had great experiences with it and stated her insurance is changing and that her new plan does not offer transportation. JOANNA LEUNG reviewed [facility] and patient stated she would need assistance on and off the van. JOANNA LEUNG stated that usually if you notify them ahead they are able to provide that, patient declined. Patient stated she marked on her Waiver application she would need assistance with transportation so she is hopeful the aide she gets is able to assist with this.    Patient reported no other needs. JOANNA LEUNG provided contact information to patient if she changes her mind about [employer]. Patient appreciative of outreach. JOANNA LEUNG closed referral.

## 2024-12-05 ENCOUNTER — APPOINTMENT (OUTPATIENT)
Dept: LAB | Facility: HOSPITAL | Age: 79
End: 2024-12-05
Payer: MEDICARE

## 2024-12-05 ENCOUNTER — TELEPHONE (OUTPATIENT)
Age: 79
End: 2024-12-05

## 2024-12-05 DIAGNOSIS — Z98.84 STATUS POST BARIATRIC SURGERY: ICD-10-CM

## 2024-12-05 DIAGNOSIS — E53.8 B12 DEFICIENCY: ICD-10-CM

## 2024-12-05 DIAGNOSIS — Z13.1 SCREENING FOR DIABETES MELLITUS: ICD-10-CM

## 2024-12-05 LAB
BASOPHILS # BLD AUTO: 0.06 THOUSANDS/ΜL (ref 0–0.1)
BASOPHILS NFR BLD AUTO: 1 % (ref 0–1)
EOSINOPHIL # BLD AUTO: 0.08 THOUSAND/ΜL (ref 0–0.61)
EOSINOPHIL NFR BLD AUTO: 1 % (ref 0–6)
ERYTHROCYTE [DISTWIDTH] IN BLOOD BY AUTOMATED COUNT: 13.2 % (ref 11.6–15.1)
EST. AVERAGE GLUCOSE BLD GHB EST-MCNC: 94 MG/DL
FERRITIN SERPL-MCNC: 49 NG/ML (ref 11–307)
HBA1C MFR BLD: 4.9 %
HCT VFR BLD AUTO: 34.7 % (ref 34.8–46.1)
HGB BLD-MCNC: 11 G/DL (ref 11.5–15.4)
IMM GRANULOCYTES # BLD AUTO: 0.03 THOUSAND/UL (ref 0–0.2)
IMM GRANULOCYTES NFR BLD AUTO: 1 % (ref 0–2)
LYMPHOCYTES # BLD AUTO: 1.57 THOUSANDS/ΜL (ref 0.6–4.47)
LYMPHOCYTES NFR BLD AUTO: 26 % (ref 14–44)
MCH RBC QN AUTO: 30.4 PG (ref 26.8–34.3)
MCHC RBC AUTO-ENTMCNC: 31.7 G/DL (ref 31.4–37.4)
MCV RBC AUTO: 96 FL (ref 82–98)
MONOCYTES # BLD AUTO: 0.45 THOUSAND/ΜL (ref 0.17–1.22)
MONOCYTES NFR BLD AUTO: 8 % (ref 4–12)
NEUTROPHILS # BLD AUTO: 3.85 THOUSANDS/ΜL (ref 1.85–7.62)
NEUTS SEG NFR BLD AUTO: 63 % (ref 43–75)
NRBC BLD AUTO-RTO: 0 /100 WBCS
PLATELET # BLD AUTO: 264 THOUSANDS/UL (ref 149–390)
PMV BLD AUTO: 10.3 FL (ref 8.9–12.7)
PTH-INTACT SERPL-MCNC: 60 PG/ML (ref 12–88)
RBC # BLD AUTO: 3.62 MILLION/UL (ref 3.81–5.12)
TSH SERPL DL<=0.05 MIU/L-ACNC: 2.29 UIU/ML (ref 0.45–4.5)
VIT B12 SERPL-MCNC: 272 PG/ML (ref 180–914)
WBC # BLD AUTO: 6.04 THOUSAND/UL (ref 4.31–10.16)

## 2024-12-05 PROCEDURE — 83036 HEMOGLOBIN GLYCOSYLATED A1C: CPT

## 2024-12-05 PROCEDURE — 82607 VITAMIN B-12: CPT

## 2024-12-05 NOTE — TELEPHONE ENCOUNTER
Patient called to reschedule visit (12/17/24 at 10:00am with Dr Welsh), she stated she wants to wait until new year as her insurance is changing. She is now scheduled 1/6/25 at 11:00am with Dr Paulson.     Patient possibly interested in [place] service and would like waiver mailed to her home address to sign and return. Please advise.

## 2024-12-06 ENCOUNTER — RESULTS FOLLOW-UP (OUTPATIENT)
Age: 79
End: 2024-12-06

## 2024-12-06 LAB
ALBUMIN SERPL BCG-MCNC: 4.3 G/DL (ref 3.5–5)
ALP SERPL-CCNC: 121 U/L (ref 34–104)
ALT SERPL W P-5'-P-CCNC: 11 U/L (ref 7–52)
ANION GAP SERPL CALCULATED.3IONS-SCNC: 10 MMOL/L (ref 4–13)
AST SERPL W P-5'-P-CCNC: 18 U/L (ref 13–39)
BILIRUB SERPL-MCNC: 0.34 MG/DL (ref 0.2–1)
BUN SERPL-MCNC: 31 MG/DL (ref 5–25)
CALCIUM SERPL-MCNC: 9.6 MG/DL (ref 8.4–10.2)
CHLORIDE SERPL-SCNC: 106 MMOL/L (ref 96–108)
CHOLEST SERPL-MCNC: 197 MG/DL (ref ?–200)
CO2 SERPL-SCNC: 20 MMOL/L (ref 21–32)
CREAT SERPL-MCNC: 1.02 MG/DL (ref 0.6–1.3)
GFR SERPL CREATININE-BSD FRML MDRD: 52 ML/MIN/1.73SQ M
GLUCOSE SERPL-MCNC: 81 MG/DL (ref 65–140)
HDLC SERPL-MCNC: 56 MG/DL
IRON SATN MFR SERPL: 18 % (ref 15–50)
IRON SERPL-MCNC: 63 UG/DL (ref 50–212)
LDLC SERPL CALC-MCNC: 123 MG/DL (ref 0–100)
NONHDLC SERPL-MCNC: 141 MG/DL
POTASSIUM SERPL-SCNC: 4.7 MMOL/L (ref 3.5–5.3)
PROT SERPL-MCNC: 7.1 G/DL (ref 6.4–8.4)
SODIUM SERPL-SCNC: 136 MMOL/L (ref 135–147)
TIBC SERPL-MCNC: 341 UG/DL (ref 250–450)
TRIGL SERPL-MCNC: 90 MG/DL (ref ?–150)
UIBC SERPL-MCNC: 278 UG/DL (ref 155–355)

## 2024-12-17 ENCOUNTER — TELEPHONE (OUTPATIENT)
Dept: RADIOLOGY | Facility: CLINIC | Age: 79
End: 2024-12-17

## 2024-12-17 NOTE — TELEPHONE ENCOUNTER
Called patient to see if she had her update ins info- Patient did not answer and does not have a voicemail box set up    Please obtain new ins infor if patient calls back and make her aware unable to leave voicemail's.     Please let me know if patient calls back-   Thank you

## 2024-12-17 NOTE — TELEPHONE ENCOUNTER
Caller: nadira Gomez    Doctor: Brittny    Reason for call: new insurance info   [employer] P[employer]  ID# [id number]      Phone # [phone] [facility] PCA Audit    Call back#: [phone]

## 2024-12-17 NOTE — TELEPHONE ENCOUNTER
Caller: Pt    Doctor: Dori    Reason for call: Pt returning call to . Pt will call back with NEW insurance information required to complete auth for injection booked on 1/13.    Please collect new insurance information and route information to prior auth team       Call back#: [phone]

## 2024-12-17 NOTE — TELEPHONE ENCOUNTER
Hortencia GRANT Spine [facility] Surgery Coordinator [place] Misha Branham,    [facility] terms on 12/31/24. Please get updated insurance infmoration for the new year.    Thank you,  Hortencia

## 2025-01-03 ENCOUNTER — TELEPHONE (OUTPATIENT)
Age: 80
End: 2025-01-03

## 2025-01-03 NOTE — TELEPHONE ENCOUNTER
Pt said she missed the call again from the practice but did not see any chart note. Kindly reach out to the patient please. Thanks

## 2025-01-03 NOTE — TELEPHONE ENCOUNTER
Patient called and stated she had just missed a call from the office. Upon chart review I unfortunately did not see that a call had been made at this time. If outreach had been made please return patient call to discuss.

## 2025-01-06 ENCOUNTER — TELEPHONE (OUTPATIENT)
Age: 80
End: 2025-01-06

## 2025-01-06 DIAGNOSIS — F32.A DEPRESSION, UNSPECIFIED DEPRESSION TYPE: Primary | ICD-10-CM

## 2025-01-06 RX ORDER — TRAMADOL HYDROCHLORIDE 50 MG/1
50 TABLET ORAL EVERY 6 HOURS PRN
OUTPATIENT
Start: 2025-01-06

## 2025-01-06 RX ORDER — LORAZEPAM 1 MG/1
1 TABLET ORAL 3 TIMES DAILY PRN
OUTPATIENT
Start: 2025-01-06

## 2025-01-06 RX ORDER — LORAZEPAM 1 MG/1
1 TABLET ORAL 3 TIMES DAILY PRN
Qty: 90 TABLET | Refills: 0 | Status: SHIPPED | OUTPATIENT
Start: 2025-01-06

## 2025-01-06 NOTE — TELEPHONE ENCOUNTER
"Contacted Pt. in regards to ROUTINE Referral. Placing patient on Medication Management Waitlist. Prefers location closest to [place]. Scheduling NP appt for TT.    Behavioral Health Outpatient Intake Questions    Referred By   :     Please advise interviewee that they need to answer all questions truthfully to allow for best care, and any misrepresentations of information may affect their ability to be seen at this clinic   => Was this discussed? Yes       Behavioral Health Outpatient Intake History -     Presenting Problem (in patient's own words): Anxiety, Pain management    Are there any communication barriers for this patient?     No                                                 Are you taking any psychiatric medications? Yes     If \"YES\" -What are they? Lorazapam, Tramadol    If \"YES\" -Who prescribes? PCP    Has the Patient previously received outpatient Talk Therapy or Medication Management from [facility]  No         If \"NO\" -Has Patient received these services elsewhere?       If \"YES\" -When, Where, and with Whom? Psychiatrist and therapy with [facility] in [place]    Has the Patient abused alcohol or other substances in the last 6 months ? No  No concerns of substance abuse are reported.    Legal History-     Is this treatment court ordered? No     Has the Patient been convicted of a felony?  No      ACCEPTED as a patient Yes  If \"Yes\" Appointment Date: 04/09/2025 @ 2 pm in [place] with Vasiliy David    Referred Elsewhere? No    Name of Insurance Co:BLUE CROSS  REP   Insurance ID#[id number]   Insurance Phone #  If ins is primary or secondary?Primary  If patient is a minor, parents information such as Name, D.O.B of guarantor.  "

## 2025-01-06 NOTE — TELEPHONE ENCOUNTER
Attempted to contact pt. Voice mail has not been set up yet. Unable to leave message. Please try again.

## 2025-01-06 NOTE — TELEPHONE ENCOUNTER
This medication drug class is not recommended in those with fibromyalgia. We also have never prescribed this for her in the past therefore would defer back to the last provider to prescribe.

## 2025-01-06 NOTE — TELEPHONE ENCOUNTER
Attempted to contact pt. Voice mail box not set up yet. Unable to leave message. Please try again.

## 2025-01-06 NOTE — TELEPHONE ENCOUNTER
Caller: patient    Doctor: SOURAV    Reason for call: Patient went to her PCP and was not able to get Tramadol, PCP recommended to FU with [facility] for her script    Patient state she take Tramadol 50mg PRN     [facility] - [address]              Call back#:

## 2025-01-06 NOTE — TELEPHONE ENCOUNTER
Patient called the RX Refill Line. Message is being forwarded to the office.     Patient states she used it for migraines in the past and when that has subsided she continued the medication for arthritis per her rheumatologist recommended. Patient aware it was never prescribed at this practice, and is unable to get script from previous provider since it was prescribed in an outpatient facility. States she does not take this on a regular basis. Uses medication very seldomly when nothing seems to help her symptoms.     She is afraid to take anything else due to her history with reactions to certain medications.     Please contact patient at [phone]

## 2025-01-08 NOTE — TELEPHONE ENCOUNTER
S/w pt, advised of the same. Pt verbalized understanding and agreeable to CLAY and ortho referral. Please advise regarding ortho referral.

## 2025-01-08 NOTE — TELEPHONE ENCOUNTER
S/w pt, advise that tramadol medication drug class is not recommended in those with fibromyalgia and medication was not prescribed by this office in the past. Pt states she is unable to reach out to initial prescribed and states PCP is not comfortable with prescribing tramadol. Pt states she is unable to differentiate between fibro pain and arthritis pain, states she only takes tramadol as a last resort and uses sparingly. Pt has extensive allergy list which limits medication options, which is why tramadol was prescribed initially. Pt asked if [facility] has recommendations for alternatives if tramadol will not be continued as she is unable to find relief with tylenol prn. Please advise.

## 2025-01-08 NOTE — TELEPHONE ENCOUNTER
She's scheduled for a cervical epidural with us. If she has other joint complaints, she can see an orthopedic to see if CSI would be an option for her to avoid oral medications since she has extensive intolerances.

## 2025-01-09 NOTE — TELEPHONE ENCOUNTER
Caller: nadira Gomez     Doctor: Brittny     Reason for call: pt calling to speak to the nurse.    Call back#: [phone]

## 2025-01-09 NOTE — TELEPHONE ENCOUNTER
S/w the patient to review. Looks like there was some miscommunication with this task. She is aware of her scheduled injections. She is following up c/ Ortho d/t her trigger finger and hand. Not for anything else. She was inquiring to see if you would consider taking over prescribing her tramadol. Her PCP does not feel comfortable in prescribing her the medication any longer D/t her being treated by us. She was prescribed #10 about a month ago and she stated she has a few left. She is aware that she is being prescribed Ativan BID to TID depending on her symptoms. She did have a psychiatrist who recently moved to [place] that was following her but now she does have a new appointment scheduled for April to discuss that. A HX of trauma and she has ongoing night terrors that cause her to need that medication. Reviewed risks of being prescribed benzos and narcotics which included resp depression and or death. She stated she never takes both together and rarely takes the tramadol unless she is unable to walk and only if the pain becomes excruciating. She stated she is aware that she has several allergies and intolerances and she would be open to discuss nonopiate therapy also. She stated she just wants something to help with the pain. Reviewed that she is scheduled for injections which could help. An OVS was scheduled to discuss medication options. Just FYI. The patient appreciated the call.

## 2025-01-13 ENCOUNTER — TELEPHONE (OUTPATIENT)
Dept: PSYCHIATRY | Facility: CLINIC | Age: 80
End: 2025-01-13

## 2025-01-22 ENCOUNTER — TELEPHONE (OUTPATIENT)
Dept: ADMINISTRATIVE | Facility: OTHER | Age: 80
End: 2025-01-22

## 2025-01-22 ENCOUNTER — TELEPHONE (OUTPATIENT)
Age: 80
End: 2025-01-22

## 2025-01-22 NOTE — TELEPHONE ENCOUNTER
Caller: nadira Gomez     Doctor: Tyson    Reason for call: pt calling to speak to  about upcoming procedure appts.     Call back#: [phone]

## 2025-01-22 NOTE — TELEPHONE ENCOUNTER
01/22/25 1:56 PM    Patient contacted to bring Advance Directive, POLST, or Living Will document to next scheduled pcp visit.[facility] Department spoke with patient/ caregiver.    Thank you.  Amador Morfin MA  [facility]

## 2025-01-27 ENCOUNTER — RA CDI HCC (OUTPATIENT)
Dept: OTHER | Facility: HOSPITAL | Age: 80
End: 2025-01-27

## 2025-01-27 NOTE — PROGRESS NOTES
Please review if this dx is applicable to the patient's condition and assess and document, if applicable in next visit        HCC coding opportunities          Chart Reviewed number of suggestions sent to Provider: 1     Patients Insurance     Medicare Insurance: [employer] Advantage

## 2025-01-28 ENCOUNTER — OFFICE VISIT (OUTPATIENT)
Age: 80
End: 2025-01-28
Payer: COMMERCIAL

## 2025-01-28 VITALS
BODY MASS INDEX: 50.7 KG/M2 | TEMPERATURE: 97.5 F | OXYGEN SATURATION: 99 % | SYSTOLIC BLOOD PRESSURE: 118 MMHG | WEIGHT: 235 LBS | HEIGHT: 57 IN | HEART RATE: 61 BPM | DIASTOLIC BLOOD PRESSURE: 80 MMHG

## 2025-01-28 DIAGNOSIS — C18.7 MALIGNANT NEOPLASM OF SIGMOID COLON (HCC): ICD-10-CM

## 2025-01-28 DIAGNOSIS — F32.1 CURRENT MODERATE EPISODE OF MAJOR DEPRESSIVE DISORDER WITHOUT PRIOR EPISODE (HCC): ICD-10-CM

## 2025-01-28 DIAGNOSIS — Z12.11 COLON CANCER SCREENING: ICD-10-CM

## 2025-01-28 DIAGNOSIS — E66.01 MORBID OBESITY (HCC): ICD-10-CM

## 2025-01-28 DIAGNOSIS — E78.2 MIXED HYPERLIPIDEMIA: ICD-10-CM

## 2025-01-28 DIAGNOSIS — I10 PRIMARY HYPERTENSION: Primary | ICD-10-CM

## 2025-01-28 DIAGNOSIS — M48.061 SPINAL STENOSIS OF LUMBAR REGION, UNSPECIFIED WHETHER NEUROGENIC CLAUDICATION PRESENT: ICD-10-CM

## 2025-01-28 DIAGNOSIS — Z98.84 STATUS POST BARIATRIC SURGERY: ICD-10-CM

## 2025-01-28 DIAGNOSIS — N18.31 STAGE 3A CHRONIC KIDNEY DISEASE (HCC): ICD-10-CM

## 2025-01-28 DIAGNOSIS — Z23 ENCOUNTER FOR IMMUNIZATION: ICD-10-CM

## 2025-01-28 DIAGNOSIS — D12.6 TUBULOVILLOUS ADENOMA OF COLON: ICD-10-CM

## 2025-01-28 PROBLEM — K56.600 PARTIAL SMALL BOWEL OBSTRUCTION (HCC): Status: RESOLVED | Noted: 2024-05-26 | Resolved: 2025-01-28

## 2025-01-28 PROBLEM — M46.1 INFLAMMATION OF SACROILIAC JOINT (HCC): Status: RESOLVED | Noted: 2023-12-05 | Resolved: 2025-01-28

## 2025-01-28 PROCEDURE — G0009 ADMIN PNEUMOCOCCAL VACCINE: HCPCS

## 2025-01-28 PROCEDURE — 90677 PCV20 VACCINE IM: CPT

## 2025-01-28 PROCEDURE — 99214 OFFICE O/P EST MOD 30 MIN: CPT | Performed by: INTERNAL MEDICINE

## 2025-01-28 PROCEDURE — G2211 COMPLEX E/M VISIT ADD ON: HCPCS | Performed by: INTERNAL MEDICINE

## 2025-01-28 NOTE — ASSESSMENT & PLAN NOTE
Stable in the process of getting with [facility] psychiatry.  I am covering her prescriptions until then

## 2025-01-28 NOTE — PROGRESS NOTES
Name: Patricia Mariscal      : 1945      MRN: [id number]  Encounter Provider: Swetha Mcgowan MD  Encounter Date: 2025   Encounter department: [facility]  :  Assessment & Plan  Primary hypertension  Well-controlled continue current regimen       Stage 3a chronic kidney disease (HCC)  Lab Results   Component Value Date    EGFR 52 2024    EGFR 54 2024    EGFR 49 2024    CREATININE 1.02 2024    CREATININE 0.99 2024    CREATININE 1.07 2024   Continue with nephrotoxic precaution         Tubulovillous adenoma of colon  Patient history of colon cancer when she was residing in [place] several years ago underwent resection.  Last colonoscopy with   showed tubulovillous adenoma  Orders:    Ambulatory Referral to Gastroenterology; Future    Malignant neoplasm of sigmoid colon (HCC)  patient underwent surgery while she was in [place].  ENRIQUE          Current moderate episode of major depressive disorder without prior episode (HCC)  Stable in the process of getting with [facility] psychiatry.  I am covering her prescriptions until then         Spinal stenosis of lumbar region, unspecified whether neurogenic claudication present         Status post bariatric surgery         Colon cancer screening    Orders:    Ambulatory Referral to Gastroenterology; Future    Encounter for immunization    Orders:    Pneumococcal Conjugate Vaccine 20-valent (Pcv20)    Mixed hyperlipidemia    Orders:    Lipid Panel with Direct LDL reflex    Morbid obesity (HCC)              History of Present Illness   HPI  Patient with hypertension hyperlipidemia with history of colon cancer status postresection with history of tubulovillous adenoma 2018, morbidly obese,major depressive disorder is here to follow-up on chronic medical problems.  She is in process of getting with a new psychiatrist.  Recent labs were reviewed.    Glucose and LDL are acceptable.  She was noted to have  "mild macrocytic anemia.  Iron levels were good.B12 was noted to be quite low.    Review of Systems    Objective   /80 (BP Location: Left arm, Patient Position: Sitting, Cuff Size: Large)   Pulse 61   Temp 97.5 °F (36.4 °C) (Temporal)   Ht 4' 9\" (1.448 m)   Wt 107 kg (235 lb)   SpO2 99%   BMI 50.85 kg/m²      Physical Exam  Constitutional:       Appearance: She is obese.   Cardiovascular:      Rate and Rhythm: Normal rate and regular rhythm.      Heart sounds: Normal heart sounds. No murmur heard.  Pulmonary:      Effort: Pulmonary effort is normal. No respiratory distress.      Breath sounds: Normal breath sounds. No wheezing or rales.   Abdominal:      General: There is no distension.      Tenderness: There is no abdominal tenderness.   Neurological:      Mental Status: She is alert.      Gait: Gait abnormal (Walker assisted ambulation).         "

## 2025-01-28 NOTE — ASSESSMENT & PLAN NOTE
Lab Results   Component Value Date    EGFR 52 12/05/2024    EGFR 54 05/26/2024    EGFR 49 05/24/2024    CREATININE 1.02 12/05/2024    CREATININE 0.99 05/26/2024    CREATININE 1.07 05/24/2024   Continue with nephrotoxic precaution

## 2025-01-29 ENCOUNTER — HOSPITAL ENCOUNTER (EMERGENCY)
Facility: HOSPITAL | Age: 80
Discharge: HOME | End: 2025-01-30
Attending: EMERGENCY MEDICINE
Payer: COMMERCIAL

## 2025-01-29 DIAGNOSIS — M54.50 CHRONIC BILATERAL LOW BACK PAIN WITHOUT SCIATICA: Primary | ICD-10-CM
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
## 2025-01-29 DIAGNOSIS — R26.2 AMBULATORY DYSFUNCTION: ICD-10-CM

## 2025-01-29 DIAGNOSIS — G89.29 CHRONIC BILATERAL LOW BACK PAIN WITHOUT SCIATICA: Primary | ICD-10-CM

## 2025-01-29 PROCEDURE — 99284 EMERGENCY DEPT VISIT MOD MDM: CPT

## 2025-01-29 PROCEDURE — 99285 EMERGENCY DEPT VISIT HI MDM: CPT | Performed by: EMERGENCY MEDICINE

## 2025-01-29 RX ORDER — METHOCARBAMOL 500 MG/1
500 TABLET, FILM COATED ORAL ONCE
Status: COMPLETED | OUTPATIENT
Start: 2025-01-29 | End: 2025-01-29

## 2025-01-29 RX ORDER — ACETAMINOPHEN 325 MG/1
975 TABLET ORAL ONCE
Status: COMPLETED | OUTPATIENT
Start: 2025-01-29 | End: 2025-01-29

## 2025-01-29 RX ADMIN — ACETAMINOPHEN 975 MG: 325 TABLET, FILM COATED ORAL at 23:55

## 2025-01-29 RX ADMIN — METHOCARBAMOL 500 MG: 500 TABLET ORAL at 23:55

## 2025-01-30 ENCOUNTER — APPOINTMENT (EMERGENCY)
Dept: RADIOLOGY | Facility: HOSPITAL | Age: 80
End: 2025-01-30
Payer: COMMERCIAL

## 2025-01-30 VITALS
RESPIRATION RATE: 18 BRPM | TEMPERATURE: 97.8 F | HEART RATE: 58 BPM | OXYGEN SATURATION: 100 % | SYSTOLIC BLOOD PRESSURE: 132 MMHG | DIASTOLIC BLOOD PRESSURE: 60 MMHG

## 2025-01-30 PROCEDURE — 72131 CT LUMBAR SPINE W/O DYE: CPT

## 2025-01-30 PROCEDURE — 72128 CT CHEST SPINE W/O DYE: CPT

## 2025-01-30 PROCEDURE — 70450 CT HEAD/BRAIN W/O DYE: CPT

## 2025-01-30 PROCEDURE — 72125 CT NECK SPINE W/O DYE: CPT

## 2025-01-30 NOTE — ED PROVIDER NOTES
Time reflects when diagnosis was documented in both MDM as applicable and the Disposition within this note       Time User Action Codes Description Comment    1/30/2025  2:34 AM Tristan Gonzalez P Add [M54.50,  G89.29] Chronic bilateral low back pain without sciatica     1/30/2025  2:34 AM Tristan Gonzalez P Add [W19.XXXA] Fall, initial encounter     1/30/2025  2:34 AM Tristan Gonzalez P Add [R26.2] Ambulatory dysfunction           ED Disposition       ED Disposition   Discharge    Condition   Stable    Date/Time   u Jan 30, 2025  2:33 AM    Comment   Patricia Mariscal discharge to home/self care.                   Assessment & Plan       Medical Decision Making  Check CT head, C-spine, T/L-spine to evaluate for traumatic injury.  If workup unremarkable, will ambulate the patient.  Tylenol and Robaxin for pain.    Problems Addressed:  Ambulatory dysfunction: chronic illness or injury  Chronic bilateral low back pain without sciatica: chronic illness or injury with exacerbation, progression, or side effects of treatment  Fall, initial encounter: acute illness or injury    Amount and/or Complexity of Data Reviewed  Radiology: ordered and independent interpretation performed.    Risk  OTC drugs.  Prescription drug management.        ED Course as of 01/30/25 0234   u Jan 30, 2025   0233 Patient able to ambulate at baseline.  Safe for discharge.       Medications   acetaminophen (TYLENOL) tablet 975 mg (975 mg Oral Given 1/29/25 2355)   methocarbamol (ROBAXIN) tablet 500 mg (500 mg Oral Given 1/29/25 2355)       ED Risk Strat Scores                          SBIRT 20yo+      Flowsheet Row Most Recent Value   Initial Alcohol Screen: US AUDIT-C     1. How often do you have a drink containing alcohol? 0 Filed at: 01/29/2025 2343   2. How many drinks containing alcohol do you have on a typical day you are drinking?  0 Filed at: 01/29/2025 2343   3a. Male UNDER 65: How often do you have five or more drinks on one occasion? 0 Filed  at: 2025   3b. FEMALE Any Age, or MALE 65+: How often do you have 4 or more drinks on one occassion? 0 Filed at: 2025   Audit-C Score 0 Filed at: 2025   MINDY: How many times in the past year have you...    Used an illegal drug or used a prescription medication for non-medical reasons? Never Filed at: 2025                            History of Present Illness       Chief Complaint   Patient presents with    Fall     Patient was ambulating with walker and tripped over the wheel and fell backwards. +HS, -LOC, -thinners. Patient reports back pain. Hx of chronic arthritis.        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[facility]) 2023    MRSA (methicillin resistant Staphylococcus aureus)     Partial small bowel obstruction ([facility]) 2024    Pneumonia       Past Surgical History:   Procedure Laterality Date    APPENDECTOMY       SECTION      CHOLECYSTECTOMY      COLON SURGERY      DILATION AND CURETTAGE OF UTERUS      EXTRACTION, ERUPTED TOOTH REQUIRING REMOVAL OF BONE AND/OR SECTIONING OF TOOTH, AND INCLUDING ELEVATION OF MUCOPERIOSTEAL FLAP IF INDICATED N/A 2024    Procedure: EXTRACTION TEETH MULTIPLE -2,3,7,10, 14,19,29,30;  Surgeon: Livan Trotter DMD;  Location: [facility];  Service: Maxillofacial    EYE SURGERY      GASTRIC BYPASS      HERNIA REPAIR      several    HYSTERECTOMY      JOINT REPLACEMENT        History reviewed. No pertinent family history.   Social History     Tobacco Use    Smoking status: Never    Smokeless tobacco: Never   Vaping Use    Vaping status: Never Used   Substance Use Topics    Alcohol use: Never    Drug use: Never      E-Cigarette/Vaping    E-Cigarette Use Never User       E-Cigarette/Vaping Substances       I have reviewed and agree with the history as documented.     79-year-old female presents with a mechanical fall.  States that she was walking with her walker and tripped over a wheel.  She fell backwards.  Positive head strike.  No loss of consciousness or blood thinner use.  Currently, complaining of low back pain and headache.        Review of Systems   Constitutional:  Negative for chills and fever.   HENT:  Negative for congestion, rhinorrhea, sore throat and trouble swallowing.    Respiratory:  Negative for cough, chest tightness, shortness of breath and wheezing.    Cardiovascular:  Negative for chest pain and palpitations.   Gastrointestinal:  Negative for abdominal pain, blood in stool, diarrhea, nausea and vomiting.   Musculoskeletal:  Positive for back pain. Negative for neck pain.   Neurological:  Positive for headaches.   All other systems reviewed and are negative.          Objective       ED Triage Vitals   Temperature Pulse Blood Pressure Respirations SpO2 Patient Position - Orthostatic VS   01/29/25 2342 01/29/25 2342 01/29/25 2342 01/29/25 2342 01/29/25 2342 01/29/25 2342   97.8 °F (36.6 °C) 61 129/59 20 98 % Sitting      Temp Source Heart Rate Source BP Location FiO2 (%) Pain Score    01/29/25 2342 01/29/25 2342 01/29/25 2342 -- 01/29/25 2355    Oral Monitor Left arm  8      Vitals      Date and Time Temp Pulse SpO2 Resp BP Pain Score FACES Pain Rating User   01/30/25 0000 -- 58 100 % 18 132/60 -- -- OO   01/29/25 2355 -- -- -- -- -- 8 -- OO   01/29/25 2342 97.8 °F (36.6 °C) 61 98 % 20 129/59 -- -- OO            Physical Exam  Vitals and nursing note reviewed.   Constitutional:       General: She is not in acute distress.     Appearance: She is well-developed. She is morbidly obese.   HENT:      Head: Normocephalic and atraumatic.      Comments: Tenderness over right occiput.  No evidence of trauma.     Mouth/Throat:      Lips: Pink.      Mouth: Mucous membranes are moist.   Eyes:      General:  Lids are normal.      Extraocular Movements: Extraocular movements intact.      Conjunctiva/sclera: Conjunctivae normal.      Pupils: Pupils are equal, round, and reactive to light.   Cardiovascular:      Rate and Rhythm: Normal rate and regular rhythm.      Heart sounds: Normal heart sounds. No murmur heard.  Pulmonary:      Effort: Pulmonary effort is normal.      Breath sounds: Normal breath sounds.   Abdominal:      General: There is no distension.      Palpations: Abdomen is soft.      Tenderness: There is no abdominal tenderness. There is no guarding or rebound.   Musculoskeletal:         General: No swelling.      Cervical back: Full passive range of motion without pain, normal range of motion and neck supple. No spinous process tenderness.      Comments: L-spine tenderness.  No T-spine tenderness.   Skin:     General: Skin is warm.      Capillary Refill: Capillary refill takes less than 2 seconds.   Neurological:      General: No focal deficit present.      Mental Status: She is alert.   Psychiatric:         Mood and Affect: Mood normal.         Speech: Speech normal.         Behavior: Behavior normal.         Results Reviewed       None            CT head without contrast   Final Interpretation by Justin Moran MD (01/30 0149)      No acute intracranial abnormality.                  Workstation performed: [id number]         CT cervical spine without contrast   Final Interpretation by Justin Moran MD (01/30 0152)      No evidence of acute cervical spine fracture or traumatic malalignment.                  Workstation performed: [id number]         CT thoracic spine without contrast   Final Interpretation by Justin Moran MD (01/30 0154)      No evidence of acute thoracic spine fracture.            Workstation performed: [id number]         CT lumbar spine without contrast   Final Interpretation by Justin Moran MD (01/30 0156)      No evidence of acute lumbar spine fracture.       Workstation performed: [id number]             Procedures    ED Medication and Procedure Management   Prior to Admission Medications   Prescriptions Last Dose Informant Patient Reported? Taking?   Cholecalciferol 125 MCG (5000 UT) capsule   Yes No   Sig: Take 10,000 Units by mouth every 30 (thirty) days   LORazepam (ATIVAN) 1 mg tablet   No No   Sig: Take 1 tablet (1 mg total) by mouth 3 (three) times a day as needed for anxiety   atenolol (TENORMIN) 50 mg tablet   No No   Sig: One and half tablet a day   diphenhydrAMINE (Benadryl Allergy) 25 mg tablet   Yes No   Sig: Take by mouth if needed   fluticasone (Flonase Allergy Relief) 50 mcg/act nasal spray   Yes No   Sig: into each nostril   hydroCHLOROthiazide 12.5 mg tablet   No No   Sig: Take 1 tablet (12.5 mg total) by mouth daily   latanoprost (XALATAN) 0.005 % ophthalmic solution   Yes No   Sig: Apply 1 drop to eye   lisinopril (ZESTRIL) 10 mg tablet   No No   Sig: Take 1 pill a day   methocarbamol (ROBAXIN) 500 mg tablet   Yes No   Sig: Take 500 mg by mouth 2 (two) times a day as needed   ondansetron (ZOFRAN) 4 mg tablet   No No   Sig: Take 1 tablet (4 mg total) by mouth every 6 (six) hours as needed for nausea or vomiting   sulfamethoxazole-trimethoprim (BACTRIM DS) 800-160 mg per tablet   Yes No   Si tablet every 12 (twelve) hours   traMADol (ULTRAM) 50 mg tablet   Yes No   Sig: Take 50 mg by mouth every 6 (six) hours as needed   venlafaxine (EFFEXOR) 75 mg tablet   No No   Sig: Take 1 tablet (75 mg total) by mouth in the morning   venlafaxine (EFFEXOR-XR) 150 mg 24 hr capsule   No No   Sig: Take 1 capsule (150 mg total) by mouth daily      Facility-Administered Medications: None     Patient's Medications   Discharge Prescriptions    No medications on file     No discharge procedures on file.  ED SEPSIS DOCUMENTATION   Time reflects when diagnosis was documented in both MDM as applicable and the Disposition within this note       Time User Action Codes  Description Comment    1/30/2025  2:34 AM Tristan Gonzalez [M54.50,  G89.29] Chronic bilateral low back pain without sciatica     1/30/2025  2:34 AM Tristan Gonzalez [W19.XXXA] Fall, initial encounter     1/30/2025  2:34 AM Tristan Gonzalze [R26.2] Ambulatory dysfunction                  Tristan Gonzalez MD  01/30/25 0234

## 2025-01-31 ENCOUNTER — VBI (OUTPATIENT)
Age: 80
End: 2025-01-31

## 2025-01-31 NOTE — TELEPHONE ENCOUNTER
01/31/25 3:03 PM    Patient contacted post ED visit, [facility] department spoke with patient/caregiver and outreach was successful.    Thank you.  Moose Calderón MA  [facility]

## 2025-02-10 ENCOUNTER — TELEPHONE (OUTPATIENT)
Age: 80
End: 2025-02-10

## 2025-02-10 NOTE — TELEPHONE ENCOUNTER
Caller: PT    Doctor: Dr. Mehta    Reason for call: PT had to cancel injection appt today and wants to reschedule.     Please assist.     Call back#: [phone]

## 2025-02-10 NOTE — TELEPHONE ENCOUNTER
Called patient back- unable to leave a voicemail- states patients voicemail box has not been set up yet

## 2025-02-11 DIAGNOSIS — F32.A DEPRESSION, UNSPECIFIED DEPRESSION TYPE: ICD-10-CM

## 2025-02-11 RX ORDER — LORAZEPAM 1 MG/1
1 TABLET ORAL 3 TIMES DAILY PRN
Qty: 90 TABLET | Refills: 0 | Status: SHIPPED | OUTPATIENT
Start: 2025-02-11

## 2025-02-11 NOTE — TELEPHONE ENCOUNTER
Reason for call: Patient stated that she tried reducing her dose as Dr Mcgowan had suggested but at this point she is still needing to take 1 tablet 3 times a day.  [x] Refill   [] Prior Auth  [] Other:     Office:   [x] PCP/Provider - Swetha Mcgowan MD / [facility]   [] Specialty/Provider -     Medication: LORazepam (ATIVAN) 1 mg tablet     Dose/Frequency: Take 1 tablet (1 mg total) by mouth 3 (three) times a day as needed for anxiety,     Quantity: 90    Pharmacy: [facility] - [place] - [address]     Does the patient have enough for 3 days?   [] Yes   [x] No - Send as HP to POD

## 2025-02-12 ENCOUNTER — TELEPHONE (OUTPATIENT)
Age: 80
End: 2025-02-12

## 2025-02-12 NOTE — TELEPHONE ENCOUNTER
Patient is calling to reschedule her appointment with Dr. Ramirez for tomorrow at 2 pm due to the weather. Patient states she is a fall hazard and does not feel comfortable going out in inclement weather. Patient states [place] would be closest to her home but would also not mind having an appointment in [place]. Patient preferred afternoon or somewhere between 11 am - 1 pm. As per Amarilys, I can set the patient up with an MD or DO, not an advanced practitioner or PA. I scheduled the patient for 2/26 at 11 am with Dr. Gaspar in [place]. I provided the address to our office and directions on how to find our office.      Thank you.

## 2025-02-13 NOTE — TELEPHONE ENCOUNTER
Reached out to patient today as we ended up not having the weather that was expected. Would like to see if patient wishes to reschedule to today's appointment. Patient does not wish to make her appointment today, she will keep an appointment in two weeks.

## 2025-02-18 ENCOUNTER — OFFICE VISIT (OUTPATIENT)
Dept: OBGYN CLINIC | Facility: CLINIC | Age: 80
End: 2025-02-18
Payer: COMMERCIAL

## 2025-02-18 DIAGNOSIS — M65.321 ACQUIRED TRIGGER FINGER OF RIGHT INDEX FINGER: ICD-10-CM

## 2025-02-18 PROCEDURE — 99203 OFFICE O/P NEW LOW 30 MIN: CPT | Performed by: SURGERY

## 2025-02-18 PROCEDURE — 20550 NJX 1 TENDON SHEATH/LIGAMENT: CPT | Performed by: PHYSICIAN ASSISTANT

## 2025-02-18 RX ORDER — BETAMETHASONE SODIUM PHOSPHATE AND BETAMETHASONE ACETATE 3; 3 MG/ML; MG/ML
3 INJECTION, SUSPENSION INTRA-ARTICULAR; INTRALESIONAL; INTRAMUSCULAR; SOFT TISSUE
Status: COMPLETED | OUTPATIENT
Start: 2025-02-18 | End: 2025-02-18

## 2025-02-18 RX ORDER — LIDOCAINE HYDROCHLORIDE 10 MG/ML
2.5 INJECTION, SOLUTION INFILTRATION; PERINEURAL
Status: COMPLETED | OUTPATIENT
Start: 2025-02-18 | End: 2025-02-18

## 2025-02-18 RX ADMIN — BETAMETHASONE SODIUM PHOSPHATE AND BETAMETHASONE ACETATE 3 MG: 3; 3 INJECTION, SUSPENSION INTRA-ARTICULAR; INTRALESIONAL; INTRAMUSCULAR; SOFT TISSUE at 13:00

## 2025-02-18 RX ADMIN — LIDOCAINE HYDROCHLORIDE 2.5 ML: 10 INJECTION, SOLUTION INFILTRATION; PERINEURAL at 13:00

## 2025-02-18 NOTE — PROGRESS NOTES
Assessment    Right index trigger finger      Plan    Steroid injection provided today and the patient tolerated well.  Activities as tolerated.  May follow-up as needed, call the office if symptoms persist or return in the future.  All questions answered.        Subjective     HPI    Patient ID:  Patricia Mariscal is a RHD 79 y.o. female here for evaluation of the right index finger.  According to the patient, she has a few month history of right index finger pain and locking to the point where she has to manually unlock this finger.  It happens several times a day and is becoming more painful.  No associated numbness and tingling.  There is no injury or trauma that caused this.  She does have a history of left hand trigger fingers many years ago for which she ultimately had surgery and did well with that.  She has no history of surgery to the right hand.  She does have several listed allergies but states she has had no allergic reaction to injectable steroid medication in the past.      The following portions of the patient's history were reviewed and updated as appropriate: allergies, current medications, past family history, past medical history, past social history, past surgical history, and problem list.    Review of Systems     Objective    Imaging:  None    Physical Exam     General appearance:  NAD   Cardiac:  Regular rate  Lungs:  Unlabored breathing  Abdomen:  Non-distended    Orthopedic Examination:  Right index finger     Inspection: No open wounds or erythema.  No ecchymosis or swelling.  Arthritic changes are noted about the bilateral hands.    Palpation: Tender to palpation A1 pulley with palpable nodule.    Range-of-motion: Active locking of the finger with range of motion    Strength:  normal    Sensation:  ILT    Special Tests:  Good cap refill at the fingertip  Palpable radial pulse.      Hand/upper extremity injection: R index A1  Universal Protocol:  Consent: Verbal consent obtained.  Risks  and benefits: risks, benefits and alternatives were discussed  Consent given by: patient  Patient identity confirmed: verbally with patient  Supporting Documentation  Indications: pain and tendon swelling   Procedure Details  Condition:trigger finger Location: index finger - R index A1   Preparation: Patient was prepped and draped in the usual sterile fashion  Needle size: 22 G  Ultrasound guidance: no  Medications administered: 2.5 mL lidocaine 1 %; 3 mg betamethasone acetate-betamethasone sodium phosphate 6 (3-3) mg/mL

## 2025-02-24 ENCOUNTER — HOSPITAL ENCOUNTER (OUTPATIENT)
Dept: RADIOLOGY | Facility: CLINIC | Age: 80
Discharge: HOME | End: 2025-02-24
Payer: COMMERCIAL

## 2025-02-24 VITALS
SYSTOLIC BLOOD PRESSURE: 148 MMHG | OXYGEN SATURATION: 100 % | RESPIRATION RATE: 16 BRPM | HEART RATE: 62 BPM | DIASTOLIC BLOOD PRESSURE: 98 MMHG | TEMPERATURE: 97.8 F

## 2025-02-24 DIAGNOSIS — M54.16 LUMBAR RADICULOPATHY: ICD-10-CM

## 2025-02-24 PROCEDURE — 64483 NJX AA&/STRD TFRM EPI L/S 1: CPT | Performed by: ANESTHESIOLOGY

## 2025-02-24 PROCEDURE — A9585 GADOBUTROL INJECTION: HCPCS | Performed by: ANESTHESIOLOGY

## 2025-02-24 RX ORDER — PAPAVERINE HCL 150 MG
15 CAPSULE, EXTENDED RELEASE ORAL ONCE
Status: COMPLETED | OUTPATIENT
Start: 2025-02-24 | End: 2025-02-24

## 2025-02-24 RX ORDER — GADOBUTROL 604.72 MG/ML
2 INJECTION INTRAVENOUS ONCE
Status: COMPLETED | OUTPATIENT
Start: 2025-02-24 | End: 2025-02-24

## 2025-02-24 RX ADMIN — DEXAMETHASONE SODIUM PHOSPHATE 15 MG: 10 INJECTION, SOLUTION INTRAMUSCULAR; INTRAVENOUS at 13:24

## 2025-02-24 RX ADMIN — LIDOCAINE HYDROCHLORIDE 2 ML: 20 INJECTION, SOLUTION EPIDURAL; INFILTRATION; INTRACAUDAL; PERINEURAL at 13:24

## 2025-02-24 RX ADMIN — GADOBUTROL 2 ML: 604.72 INJECTION INTRAVENOUS at 13:24

## 2025-02-24 NOTE — DISCHARGE INSTR - LAB
Epidural Steroid Injection   WHAT YOU NEED TO KNOW:   An epidural steroid injection (ALONSO) is a procedure to inject steroid medicine into the epidural space. The epidural space is between your spinal cord and vertebrae. Steroids reduce inflammation and fluid buildup in your spine that may be causing pain. You may be given pain medicine along with the steroids.          ACTIVITY  Do not drive or operate machinery today.  No strenuous activity today - bending, lifting, etc.  You may resume normal activites starting tomorrow - start slowly and as tolerated.  You may shower today, but no tub baths or hot tubs.  You may have numbness for several hours from the local anesthetic. Please use caution and common sense, especially with weight-bearing activities.    CARE OF THE INJECTION SITE  If you have soreness or pain, apply ice to the area today (20 minutes on/20 minutes off).  Starting tomorrow, you may use warm, moist heat or ice if needed.  You may have an increase or change in your discomfort for 36-48 hours after your treatment.  Apply ice and continue with any pain medication you have been prescribed.  Notify the [facility] if you have any of the following: redness, drainage, swelling, headache, stiff neck or fever above 100°F.    SPECIAL INSTRUCTIONS  Our office will contact you in approximately 14 days for a progress report.    MEDICATIONS  Continue to take all routine medications.  Our office may have instructed you to hold some medications.    As no general anesthesia was used in today's procedure, you should not experience any side effects related to anesthesia.     If you are diabetic, the steroids used in today's injection may temporarily increase your blood sugar levels after the first few days after your injection. Please keep a close eye on your sugars and alert the doctor who manages your diabetes if your sugars are significantly high from your baseline or you are symptomatic.     If you have a  problem specifically related to your procedure, please call our office at [phone].  Problems not related to your procedure should be directed to your primary care physician.

## 2025-02-24 NOTE — H&P
History of Present Illness: The patient is a 79 y.o. female who presents with complaints of low back and leg pain.    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[facility]) 2023    MRSA (methicillin resistant Staphylococcus aureus)     Partial small bowel obstruction ([facility]) 2024    Pneumonia        Past Surgical History:   Procedure Laterality Date    APPENDECTOMY       SECTION      CHOLECYSTECTOMY      COLON SURGERY      DILATION AND CURETTAGE OF UTERUS      EXTRACTION, ERUPTED TOOTH REQUIRING REMOVAL OF BONE AND/OR SECTIONING OF TOOTH, AND INCLUDING ELEVATION OF MUCOPERIOSTEAL FLAP IF INDICATED N/A 2024    Procedure: EXTRACTION TEETH MULTIPLE -2,3,7,10, 14,19,29,30;  Surgeon: Livan Trotter DMD;  Location: [facility];  Service: Maxillofacial    EYE SURGERY      GASTRIC BYPASS      HERNIA REPAIR      several    HYSTERECTOMY      JOINT REPLACEMENT           Current Outpatient Medications:     atenolol (TENORMIN) 50 mg tablet, One and half tablet a day, Disp: 45 tablet, Rfl: 5    Cholecalciferol 125 MCG (5000 UT) capsule, Take 10,000 Units by mouth every 30 (thirty) days, Disp: , Rfl:     diphenhydrAMINE (Benadryl Allergy) 25 mg tablet, Take by mouth if needed, Disp: , Rfl:     fluticasone (Flonase Allergy Relief) 50 mcg/act nasal spray, into each nostril, Disp: , Rfl:     hydroCHLOROthiazide 12.5 mg tablet, Take 1 tablet (12.5 mg total) by mouth daily, Disp: 30 tablet, Rfl: 5    latanoprost (XALATAN) 0.005 % ophthalmic solution, Apply 1 drop to eye, Disp: , Rfl:     lisinopril (ZESTRIL) 10 mg tablet, Take 1 pill a day, Disp: 30 tablet, Rfl: 5    LORazepam (ATIVAN) 1 mg tablet, Take 1 tablet (1 mg total) by mouth 3 (three) times a day as needed for anxiety, Disp: 90 tablet,  Rfl: 0    methocarbamol (ROBAXIN) 500 mg tablet, Take 500 mg by mouth 2 (two) times a day as needed, Disp: , Rfl:     ondansetron (ZOFRAN) 4 mg tablet, Take 1 tablet (4 mg total) by mouth every 6 (six) hours as needed for nausea or vomiting, Disp: 12 tablet, Rfl: 0    sulfamethoxazole-trimethoprim (BACTRIM DS) 800-160 mg per tablet, 1 tablet every 12 (twelve) hours, Disp: , Rfl:     traMADol (ULTRAM) 50 mg tablet, Take 50 mg by mouth every 6 (six) hours as needed, Disp: , Rfl:     venlafaxine (EFFEXOR) 75 mg tablet, Take 1 tablet (75 mg total) by mouth in the morning, Disp: 30 tablet, Rfl: 3    venlafaxine (EFFEXOR-XR) 150 mg 24 hr capsule, Take 1 capsule (150 mg total) by mouth daily, Disp: 30 capsule, Rfl: 3    Allergies   Allergen Reactions    Alprazolam Other (See Comments) and Hives     ineffective  ineffective      Azithromycin Hives    Cephalexin Hives    Codeine Hyperactivity, Other (See Comments) and Hives     Hyperactivity and agitation  Hyperactivity and agitation      Diclofenac GI Intolerance    Diclofenac Sodium Itching    Duloxetine Other (See Comments) and Hives     Dizziness and confusion  Dizziness and confusion      Duloxetine Hcl Dizziness and Other (See Comments)    Erythromycin Hives    Fentanyl Itching    Hydrochlorothiazide Other (See Comments)     Other reaction(s): hypotensive    Hydromorphone Hives    Ibuprofen Other (See Comments) and GI Intolerance     Stomach pains  Stomach pains      Iodinated Contrast Media Hives    Iodine - Food Allergy Itching     CT dye    Latex Itching    Medical Tape Itching and Other (See Comments)      Paper tape - skin tears off      Meloxicam GI Intolerance    Methadone Other (See Comments) and Hives     agitation  agitation      Methylprednisolone Other (See Comments)     Raises BP  Raises blood pressure      Milnacipran Itching    Mirtazapine Other (See Comments) and Hives     hallucinations  hallucinations      Molds & Smuts Sneezing    Morphine Other (See  Comments)     Agitation  Agitation if on for more than a few days  Agitation if on for more than a few days      Naloxone Other (See Comments)    Naproxen Other (See Comments) and GI Bleeding     Stomach pains  Stomach pain      Nefazodone Hives    Nitrofurantoin Hives    Oxycodone-Acetaminophen Itching and Hives     Agitation  Itchy and agitation      Pentazocine Other (See Comments)     Other reaction(s): did not help with pain    Pollen Extract Sneezing    Prednisone Other (See Comments)     hypertension  Tolerates steroidal inhalers  Tolerates steroidal inhalers      Pregabalin Other (See Comments)     Pt stated she is unsure why she can't take, just knows she cant take the med.  agitation      Prochlorperazine Other (See Comments)     Extreme agitation  Extreme agitation      Rofecoxib Other (See Comments)     Other reaction(s): ineffective    Sertraline Other (See Comments)     agitation  agitation      Tapentadol GI Intolerance    Amiloride Rash     Other reaction(s): hypotensive    Celecoxib Rash     Pt stated she is unsure why she can't take, just knows she cant take the med.      Clonazepam Rash    Penicillins Hives and Rash       Physical Exam:   Vitals:    02/24/25 1258   BP: 156/77   Pulse: 62   Resp: 16   Temp: 97.8 °F (36.6 °C)   SpO2: 100%     General: Awake, Alert, Oriented x 3, Mood and affect appropriate  Respiratory: Respirations even and unlabored  Cardiovascular: Peripheral pulses intact; no edema  Musculoskeletal Exam: In motorized scooter    ASA Score: 3    Patient/Chart Verification  Patient ID Verified: Verbal  ID Band Applied: No  Consents Confirmed: To be obtained in the Procedural area  H&P( within 30 days) Verified: To be obtained in the Procedural area  Interval H&P(within 24 hr) Complete (required for Outpatients and Surgery Admit only): To be obtained in the Procedural area  Allergies Reviewed: Yes  Anticoag/NSAID held?: NA  Currently on antibiotics?: Yes (hx MRSA takes abx  prophylactically. JW aware)  Pregnancy denied?: NA    Assessment:   1. Lumbar radiculopathy        Plan: B/L L5 TFESI

## 2025-02-25 ENCOUNTER — OFFICE VISIT (OUTPATIENT)
Dept: PAIN MEDICINE | Facility: CLINIC | Age: 80
End: 2025-02-25
Payer: COMMERCIAL

## 2025-02-25 VITALS — BODY MASS INDEX: 50.7 KG/M2 | WEIGHT: 235 LBS | HEIGHT: 57 IN

## 2025-02-25 DIAGNOSIS — M54.16 LUMBAR RADICULOPATHY: Primary | ICD-10-CM

## 2025-02-25 DIAGNOSIS — Z79.891 LONG-TERM CURRENT USE OF OPIATE ANALGESIC: ICD-10-CM

## 2025-02-25 DIAGNOSIS — M54.12 CERVICAL RADICULOPATHY: ICD-10-CM

## 2025-02-25 DIAGNOSIS — M47.816 LUMBAR SPONDYLOSIS: ICD-10-CM

## 2025-02-25 DIAGNOSIS — M48.061 SPINAL STENOSIS OF LUMBAR REGION, UNSPECIFIED WHETHER NEUROGENIC CLAUDICATION PRESENT: ICD-10-CM

## 2025-02-25 DIAGNOSIS — G89.4 CHRONIC PAIN SYNDROME: ICD-10-CM

## 2025-02-25 DIAGNOSIS — M19.011 PRIMARY OSTEOARTHRITIS OF RIGHT SHOULDER: ICD-10-CM

## 2025-02-25 DIAGNOSIS — F11.20 UNCOMPLICATED OPIOID DEPENDENCE (HCC): ICD-10-CM

## 2025-02-25 PROCEDURE — 99214 OFFICE O/P EST MOD 30 MIN: CPT | Performed by: NURSE PRACTITIONER

## 2025-02-25 NOTE — PROGRESS NOTES
Assessment:  1. Lumbar radiculopathy    2. Cervical radiculopathy    3. Lumbar spondylosis    4. Primary osteoarthritis of right shoulder    5. Spinal stenosis of lumbar region, unspecified whether neurogenic claudication present    6. Chronic pain syndrome    7. Long-term current use of opiate analgesic    8. Uncomplicated opioid dependence (HCC)        Plan:  While the patient was in the office today, I discussed with the patient that as per our medication management office protocol, we do not prescribe any opioid medications before we obtain a baseline drug screen from every patient. The patient was agreeable and a baseline drug screen was collected today. We will discuss the official results at the patient's next office visit. Should UDS be appropriate, will take over prescribing of Tramadol 50mg 1 tab 1-2 times daily as needed for pain. Patient will need to then sign contract with our office as well as would need Narcan RX. She understands considering her benzodiazepine use will not be adjusting OME further.     [place] Prescription Drug Monitoring Program report was reviewed and was appropriate     2.  Will avoid NSAIDs secondary to history gastric bypass  3.  Patient may continue methocarbamol and Tylenol  4.  The patient will follow-up in 4 weeks or sooner if needed    History of Present Illness:    The patient is a 79 y.o. female with a history of fibromyalgia and gastric bypass surgery last seen on 12/2/2024 who presents for a follow up office visit in regards to chronic neck pain that radiates into the right upper extremity, low back pain that radiates into the lower extremities with associated numbness and paresthesias, right greater than left, and shoulder pain.  Patient presents today to discuss med management.  She has numerous allergies to medications therefore medication options are limited. She has SE to neuropathic medications clued in both gabapentin and pregabalin.  She was unable to  tolerate duloxetine secondary to side effects and is currently on other psych medications.  She has allergies to morphine, hydrocodone, oxycodone, hydromorphone, and codeine.  She was a [facility] patient and was taking tramadol 50 mg 1 tablet 1-2 times as needed for pain with relief until she left the program.  Her last prescription was filled in August 2024 for 30 tablets and she still has two tablets left today as she does take the medication sparingly.  She states she is having trouble finding someone to refill this medication.  She does find relief with this medication and finds it keeps her functional.  She does continue methocarbamol 500 mg as needed as well.  She is unable to take NSAIDs secondary to gastric bypass.  She does not find much relief with Tylenol    Patient is status post bilateral L5 TFESI February 24, 2025.  She is scheduled for cervical epidural steroid injection in the near future as well.  She has had good relief with right GTB, and right intra-articular shoulder injections.  .  The patient rates her pain a 6 out of 10 on the numeric pain rating scale.    I have personally reviewed and/or updated the patient's past medical history, past surgical history, family history, social history, current medications, allergies, and vital signs today.       Review of Systems:    Review of Systems   Respiratory:  Negative for shortness of breath.    Cardiovascular:  Negative for chest pain.   Gastrointestinal:  Negative for constipation, diarrhea, nausea and vomiting.   Musculoskeletal:  Positive for back pain and gait problem. Negative for arthralgias, joint swelling and myalgias.   Skin:  Negative for rash.   Neurological:  Negative for dizziness, seizures and weakness.   All other systems reviewed and are negative.        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     Depression     History of transfusion     Hypertension     Inflammation of sacroiliac joint (HCC) 2023    MRSA (methicillin resistant Staphylococcus aureus)     Partial small bowel obstruction (HCC) 2024    Pneumonia        Past Surgical History:   Procedure Laterality Date    APPENDECTOMY       SECTION      CHOLECYSTECTOMY      COLON SURGERY      DILATION AND CURETTAGE OF UTERUS      EXTRACTION, ERUPTED TOOTH REQUIRING REMOVAL OF BONE AND/OR SECTIONING OF TOOTH, AND INCLUDING ELEVATION OF MUCOPERIOSTEAL FLAP IF INDICATED N/A 2024    Procedure: EXTRACTION TEETH MULTIPLE -2,3,7,10, 14,19,29,30;  Surgeon: Livan Trotter DMD;  Location: [facility];  Service: Maxillofacial    EYE SURGERY      GASTRIC BYPASS      HERNIA REPAIR      several    HYSTERECTOMY      JOINT REPLACEMENT         No family history on file.    Social History     Occupational History    Not on file   Tobacco Use    Smoking status: Never    Smokeless tobacco: Never   Vaping Use    Vaping status: Never Used   Substance and Sexual Activity    Alcohol use: Never    Drug use: Never    Sexual activity: Not Currently         Current Outpatient Medications:     atenolol (TENORMIN) 50 mg tablet, One and half tablet a day, Disp: 45 tablet, Rfl: 5    Cholecalciferol 125 MCG (5000 UT) capsule, Take 10,000 Units by mouth every 30 (thirty) days, Disp: , Rfl:     diphenhydrAMINE (Benadryl Allergy) 25 mg tablet, Take by mouth if needed, Disp: , Rfl:     fluticasone (Flonase Allergy Relief) 50 mcg/act nasal spray, into each nostril, Disp: , Rfl:     hydroCHLOROthiazide 12.5 mg tablet, Take 1 tablet (12.5 mg total) by mouth daily, Disp: 30 tablet, Rfl: 5    latanoprost (XALATAN) 0.005 % ophthalmic solution, Apply 1 drop to eye, Disp: , Rfl:     lisinopril (ZESTRIL) 10 mg tablet, Take 1 pill a day, Disp: 30 tablet, Rfl: 5    LORazepam (ATIVAN) 1 mg tablet, Take 1 tablet (1 mg total) by mouth 3 (three) times a day as needed for anxiety, Disp: 90  tablet, Rfl: 0    methocarbamol (ROBAXIN) 500 mg tablet, Take 500 mg by mouth 2 (two) times a day as needed, Disp: , Rfl:     ondansetron (ZOFRAN) 4 mg tablet, Take 1 tablet (4 mg total) by mouth every 6 (six) hours as needed for nausea or vomiting, Disp: 12 tablet, Rfl: 0    sulfamethoxazole-trimethoprim (BACTRIM DS) 800-160 mg per tablet, 1 tablet every 12 (twelve) hours, Disp: , Rfl:     traMADol (ULTRAM) 50 mg tablet, Take 50 mg by mouth every 6 (six) hours as needed, Disp: , Rfl:     venlafaxine (EFFEXOR) 75 mg tablet, Take 1 tablet (75 mg total) by mouth in the morning, Disp: 30 tablet, Rfl: 3    venlafaxine (EFFEXOR-XR) 150 mg 24 hr capsule, Take 1 capsule (150 mg total) by mouth daily, Disp: 30 capsule, Rfl: 3  No current facility-administered medications for this visit.    Allergies   Allergen Reactions    Alprazolam Other (See Comments) and Hives     ineffective  ineffective      Azithromycin Hives    Cephalexin Hives    Codeine Hyperactivity, Other (See Comments) and Hives     Hyperactivity and agitation  Hyperactivity and agitation      Diclofenac GI Intolerance    Diclofenac Sodium Itching    Duloxetine Other (See Comments) and Hives     Dizziness and confusion  Dizziness and confusion      Duloxetine Hcl Dizziness and Other (See Comments)    Erythromycin Hives    Fentanyl Itching    Hydrochlorothiazide Other (See Comments)     Other reaction(s): hypotensive    Hydromorphone Hives    Ibuprofen Other (See Comments) and GI Intolerance     Stomach pains  Stomach pains      Iodinated Contrast Media Hives    Iodine - Food Allergy Itching     CT dye    Latex Itching    Medical Tape Itching and Other (See Comments)      Paper tape - skin tears off      Meloxicam GI Intolerance    Methadone Other (See Comments) and Hives     agitation  agitation      Methylprednisolone Other (See Comments)     Raises BP  Raises blood pressure      Milnacipran Itching    Mirtazapine Other (See Comments) and Hives      "hallucinations  hallucinations      Molds & Smuts Sneezing    Morphine Other (See Comments)     Agitation  Agitation if on for more than a few days  Agitation if on for more than a few days      Naloxone Other (See Comments)    Naproxen Other (See Comments) and GI Bleeding     Stomach pains  Stomach pain      Nefazodone Hives    Nitrofurantoin Hives    Oxycodone-Acetaminophen Itching and Hives     Agitation  Itchy and agitation      Pentazocine Other (See Comments)     Other reaction(s): did not help with pain    Pollen Extract Sneezing    Prednisone Other (See Comments)     hypertension  Tolerates steroidal inhalers  Tolerates steroidal inhalers      Pregabalin Other (See Comments)     Pt stated she is unsure why she can't take, just knows she cant take the med.  agitation      Prochlorperazine Other (See Comments)     Extreme agitation  Extreme agitation      Rofecoxib Other (See Comments)     Other reaction(s): ineffective    Sertraline Other (See Comments)     agitation  agitation      Tapentadol GI Intolerance    Amiloride Rash     Other reaction(s): hypotensive    Celecoxib Rash     Pt stated she is unsure why she can't take, just knows she cant take the med.      Clonazepam Rash    Penicillins Hives and Rash       Physical Exam:    Ht 4' 9\" (1.448 m)   Wt 107 kg (235 lb)   BMI 50.85 kg/m²     Constitutional:normal, well developed, well nourished, alert, in no distress and non-toxic and no overt pain behavior.  Eyes:anicteric  HEENT:grossly intact  Neck:supple, symmetric, trachea midline and no masses   Pulmonary:even and unlabored  Cardiovascular:No edema or pitting edema present  Skin:Normal without rashes or lesions and well hydrated  Psychiatric:Mood and affect appropriate  Neurologic:Cranial Nerves II-XII grossly intact  Musculoskeletal:antalgic gait, ambulates with a rolling walker      Imaging  No orders to display   MRI CERVICAL SPINE WITHOUT CONTRAST     INDICATION: M54.12: Radiculopathy, cervical " region.     COMPARISON:  None.     TECHNIQUE:  Multiplanar, multisequence imaging of the cervical spine was performed. .        IMAGE QUALITY:  Diagnostic     FINDINGS:     ALIGNMENT: Grade 1 anterolisthesis C2-C3, C4-C5. Mild retrolisthesis of C5-C6.     MARROW SIGNAL:  Normal marrow signal is identified within the visualized bony structures.  No discrete marrow lesion. Mild Modic type I endplate degenerative change at C5-C6.     CERVICAL AND VISUALIZED THORACIC CORD:  Normal signal within the visualized cord.     PREVERTEBRAL AND PARASPINAL SOFT TISSUES:  Normal.     VISUALIZED POSTERIOR FOSSA:  The visualized posterior fossa demonstrates no abnormal signal.     CERVICAL DISC SPACES:     C2-C3: Facet arthrosis. No significant canal stenosis or foraminal narrowing.     C3-C4: Disc osteophyte complex with left-sided uncovertebral spurring. Facet arthrosis. Severe left foraminal narrowing. Mild mass effect on the thecal sac.     C4-C5: Uncovering of the disc space. Disc osteophyte complex with uncovertebral spurring and facet arthrosis. Mild to moderate canal stenosis with contact of the cord. Moderate bilateral foraminal narrowing.     C5-C6: Disc osteophyte complex with uncovertebral spurring. Facet arthrosis. Moderate canal stenosis with mild cord impingement. Severe right and moderate left foraminal narrowing.     C6-C7: Bulging annulus, asymmetric to the left. Mild mass effect on the thecal sac. No significant foraminal narrowing.     C7-T1:  Normal.     UPPER THORACIC DISC SPACES:  Normal.     OTHER FINDINGS:  None.     IMPRESSION:     Multilevel cervical spondylosis, as described above.     Most notable level is at C5-C6 where multifactorial disease results in moderate canal stenosis with mild cord impingement. Severe right foraminal narrowing is present at this level.            Impression:    1.  When compared to prior MRI of the lumbar spine on 11/29/2022, no adverse  interval change.  2.  Advanced  multilevel degenerative changes with severe multifocal spinal canal  stenosis seen at the L2/L3 and L3/L4.  3.  Severe neuroforaminal narrowing bilaterally at L3/L4, moderate/severe  neuroforaminal narrowing on the right at L1/L2, bilateral at L2/L3, on the left  at L4/L5 and L5/S1.  4.  Increased signal seen within the disc spaces at T10/T11, T12/L1, L1/L2 and  L3/L4, likely degenerative in nature.  5.  Advanced spondylosis with facet arthropathy seen at multiple levels in the  lumbar spine as described above.  6.  Moderate/severe levoscoliosis of the lumbar spine.  7.  No acute fracture.                    Workstation:[email or web address]  Narrative  History: Fecal incontinence status post spinal injection.    Procedure: MRI of the lumbar spine was obtained with the following sequences:  Sagittal T1, sagittal T2, sagittal STIR and axial T2 weighted images.    Comparison: Prior MRI of the lumbar spine 11/29/2022.    Findings: For the purposes of this dictation, the lumbar vertebrae are labeled  from a caudal to cranial direction, the first vertebra with lumbar morphology is  labeled as L5.    Conus and lower thoracic cord: The conus is normal in position and signal  intensity and terminates at the L2 level.    Marrow: There is no abnormal suspicious focal marrow replacement on the STIR  images. Elevated STIR signal seen along the endplates of T10/T11 likely  represent type I Modic changes. Hemangioma seen within the L2 vertebral body.    Alignment: On coronal planar imaging there is moderate/severe levoscoliosis with  bony apex at the L1/L2 level. There is stepwise retrolisthesis of T12 on L1 L1  on L2 L2 on L3. Grade 1 anterolisthesis of L4 on L5. These findings are  unchanged and likely degenerative.    Disc: There is increased signal seen within the disc spaces at T10/T11, T12/L1,  L1/L2 and L3/L4, these findings are nonspecific likely degenerative.    T9/T10: Only visualized on sagittal imaging demonstrates posterior disc  bulge,  facet arthropathy contributing to mild spinal canal mild right and no left  neuroforaminal narrowing.    T10/T11: Bilobed disc bulge, facet arthropathy prominent epidural fat  contributing to mild spinal canal stenosis and moderate left and mild/moderate  right neuroforaminal narrowing.    T11/T12: Bilobed disc bulge, facet arthropathy, prominent epidural fat  contributing to moderate spinal canal stenosis and minimal bilateral  neuroforaminal narrowing.    T12/L1: Retrolisthesis with uncovering of the circumferential disc, facet  arthropathy ligamentum flavum infolding and prominent epidural fat contributing  to moderate spinal canal stenosis moderate right no significant left  neuroforaminal narrowing    L1-L2: Retrolisthesis with uncovering of the broad-based disc, facet arthropathy  ligamentum flavum infolding and prominent epidural fat contributing to moderate  spinal canal stenosis with redundancy of the cord seen at this level and  moderate/severe right and mild left neuroforaminal narrowing    L2-L3 : Retrolisthesis with uncovering of the broad-based disc, facet  arthropathy, ligamentum flavum infolding and prominent epidural fat contributing  to severe spinal canal stenosis and moderate/severe bilateral neuroforaminal  narrowing    L3-L4: Circumferential disc bulge with superimposed central disc protrusion,  facet arthropathy with ligamentum flavum infolding and prominent epidural fat  contributing to severe bilateral neuroforaminal narrowing.    L4-L5: Anterolisthesis with uncovering of the broad-based disc, facet  arthropathy with ligamentum flavum infolding contributing to mild/moderate  spinal canal stenosis and moderate/severe left and moderate right neuroforaminal  narrowing.    L5-S1: Circumferential disc bulge, which narrows both lateral recesses, facet  arthropathy with ligamentum flavum infolding contributing to mild spinal canal  mild right and moderate/severe left neuroforaminal  narrowing.  Orders Placed This Encounter   Procedures    MM ALL_Prescribed Meds and Special Instructions    MM DT_Alprazolam Definitive Test    MM DT_Amphetamine Definitive Test    MM DT_Aripiprazole Definitive Test    MM DT_Bath Salts Definitive Test    MM DT_Buprenorphine Definitive Test    MM DT_Butalbital Definitive Test    MM DT_Clonazepam Definitive Test    MM DT_Clozapine Definitive Test    MM DT_Cocaine Definitive Test    MM DT_Codeine Definitive Test    MM DT_Desipramine Definitive Test    MM DT_Dextromethorphan Definitive Test    MM Diazepam Definitive Test    MM DT_Ethyl Glucuronide/Ethyl Sulfate Definitive Test    MM DT_Fentanyl Definitive Test    MM DT_Haloperidol Definitive Test    MM DT_Heroin Definitive Test    MM DT_Hydrocodone Definitive Test    MM DT_Hydromorphone Definitive Test    MM DT_Imipramine Definitive Test    MM DT_Kratom Definitive Test    MM DT_Levorphanol Definitive Test    MM Lorazepam Definitive Test    MM DT_MDMA Definitive Test    MM DT_Meperidine Definitive Test    MM DT_Methadone Definitive Test    MM DT_Methamphetamine Definitive Test    MM DT_Morphine Definitive Test    MM DT_Olanzapine Definitive Test    MM DT_Oxazepam Definitive Test    MM DT_Oxycodone Definitive Test    MM DT_Oxymorphone Definitive Test    MM DT_Phencyclidine Definitive Test    MM DT_Phenobarbital Definitive Test    MM DT_Phentermine Definitive Test    MM DT_Quetiapine Definitive Test    MM DT_Risperidone Definitive Test    MM DT_Secobarbital Definitive Test    MM DT_Spice Definitive Test    MM DT_Tapentadol Definitive Test    MM DT_Temazapam Definitive Test    MM DT_THC Definitive Test    MM DT_Tramadol Definitive Test    MM DT_Methylphenidate Definitive Test

## 2025-02-26 ENCOUNTER — TELEPHONE (OUTPATIENT)
Dept: INFECTIOUS DISEASES | Facility: CLINIC | Age: 80
End: 2025-02-26

## 2025-02-26 ENCOUNTER — CONSULT (OUTPATIENT)
Dept: INFECTIOUS DISEASES | Facility: CLINIC | Age: 80
End: 2025-02-26
Payer: COMMERCIAL

## 2025-02-26 ENCOUNTER — TELEPHONE (OUTPATIENT)
Dept: LAB | Facility: HOSPITAL | Age: 80
End: 2025-02-26

## 2025-02-26 VITALS
SYSTOLIC BLOOD PRESSURE: 118 MMHG | HEART RATE: 61 BPM | DIASTOLIC BLOOD PRESSURE: 68 MMHG | TEMPERATURE: 98.1 F | BODY MASS INDEX: 50.85 KG/M2 | WEIGHT: 235 LBS | OXYGEN SATURATION: 98 %

## 2025-02-26 DIAGNOSIS — A49.02 MRSA INFECTION: Primary | ICD-10-CM

## 2025-02-26 DIAGNOSIS — Z79.2 CHRONIC ANTIBIOTIC SUPPRESSION: ICD-10-CM

## 2025-02-26 DIAGNOSIS — T85.79XD INFECTED PROSTHETIC MESH OF ABDOMINAL WALL, SUBSEQUENT ENCOUNTER: ICD-10-CM

## 2025-02-26 PROCEDURE — 99205 OFFICE O/P NEW HI 60 MIN: CPT | Performed by: INTERNAL MEDICINE

## 2025-02-26 RX ORDER — SULFAMETHOXAZOLE AND TRIMETHOPRIM 800; 160 MG/1; MG/1
1 TABLET ORAL EVERY 12 HOURS SCHEDULED
Qty: 60 TABLET | Refills: 2 | Status: SHIPPED | OUTPATIENT
Start: 2025-02-26 | End: 2025-05-27

## 2025-02-26 NOTE — PATIENT INSTRUCTIONS
-continue bactrim for suppression as discussed  -please have lab work monthly, non-fasting. They are going to reach out to you to schedule, however I did call them so that they do so. If you do not hear from them, their number is [phone]. You can schedule them to come out to your home the third week of every month.  -Please plan to follow up in our office in 3 months  -please call if refills needed sooner  -Please call if questions    We will reach out to your primary and eventually transition care back to there office once we see stability continues.

## 2025-02-26 NOTE — PROGRESS NOTES
Name: Patricia Mariscal      : 1945      MRN: [id number]  Encounter Provider: Vandana Gaspar MD  Encounter Date: 2025   Encounter department: [facility]  :  Assessment & Plan  MRSA infection  Patient has known chronic mesh infection that initially developed in .  She has been on Bactrim suppression since that time.  MRSA isolate from 2009 reviewed in Care Everywhere.  Previous ID notes reviewed extensively and patient essentially was recommended for indefinite suppression as she only had partial mesh removal in the past.  She is otherwise tolerating the medication without issue and we are asked to comment on further suppression.  We reviewed in detail the potential for relapse and recurrence once off suppression and patient at this point as she is tolerating would rather stay on medication at this time.  There are no plans at this point for any further surgeries and she has not shown any signs of relapse currently.  We reviewed her medications and did discuss potentially discontinuing lisinopril if possible.  I let her know that we would plan for closer follow-up and potentially transition back then to primary care provider as she is essentially otherwise remained chronically stable on this medication since .  Will provide refills for Bactrim today, 3-month supply  Will plan for monthly labs with [facility] and BMP  Office staff to help arrange mobile labs  Plan for follow-up in ID office in 3 months with AP  At next visit we will transition to labs prior to visit alone if stable  Will likely plan for follow-ups every 3 months for up to a year for now  And again if patient remains stable with relatively short-term follow-up given chronicity will transition care back to PCP  Will reach out to primary care provider to potentially discontinue lisinopril if possible  Continue chronic follow-up with other providers otherwise    Orders:    CBC and differential; Standing    Basic  metabolic panel; Standing    sulfamethoxazole-trimethoprim (BACTRIM DS) 800-160 mg per tablet; Take 1 tablet by mouth every 12 (twelve) hours    Infected prosthetic mesh of abdominal wall, subsequent encounter  Patient had initial abdominal surgery and subsequently developed ventral hernias requiring mesh placement.  She developed mesh infection in 2009 at outside hospital where she was treated initially with conservative measures with drain and IV antibiotics but then eventually relapsed and recurred and only was able to have partial mesh removal.  Some of her mesh unfortunately was very deep and integrated from what she recalls.  She remains on suppressive therapy as above.  Continue suppressive antibiotic  We discussed if patient no longer tolerates, would discontinue therapy at that point  Patient is aware of the potential for relapse and recurrence once off suppressive therapy  Patient is aware of uncertainty of relapse potential given time on suppression at this point  Plan for monitoring labs as above  Discussed potential medication adjustments  Plan for follow-up as above    Orders:    CBC and differential; Standing    Basic metabolic panel; Standing    sulfamethoxazole-trimethoprim (BACTRIM DS) 800-160 mg per tablet; Take 1 tablet by mouth every 12 (twelve) hours    Chronic antibiotic suppression  Patient being sent to our office to evaluate for the need for ongoing suppression.  Discussed potential for relapse and recurrence and patient opts to continue on suppression as long as she tolerates.  We discussed that I think this is a reasonable approach given the time that she has been on it.  Will continue care as above.    Orders:    Ambulatory Referral to Infectious Disease      Above plan discussed in detail with the patient  Will forward office visit to primary care provider and will send separate message via epic in terms of blood pressure medication    Return to office in 3 months    HISTORY OF PRESENT  ILLNESS:  Reason for Consult: Chronic mesh infection    HPI: Patricia Mariscal is a 79 y.o. year old female with anxiety, arthritis, hypertension, surgical history reviewed below.  She has been referred to our office at this time as she remains on chronic suppressive Bactrim after infections involving her intra-abdomina mesh.  We are asked to determine need for ongoing suppressive therapy.  Was able to review in detail previous infectious disease notes.  Essentially this patient had an infected abdominal mass with MRSA involvement that was initially treated in April 2009 with interventional radiology's abscess drain and IV antibiotics for about 2 weeks followed by oral Bactrim.  She was subsequently taken back to the OR for a nonhealing wound where she underwent incision and drainage in June 2009.  She was found to have subcutaneous abscesses located at the level of the mesh and she had only partial mesh removal.  Cultures at the time only isolated coagulase-negative staph in broth only.  She essentially remained on chronic Bactrim suppression since that time.  She was essentially recommended to stay on therapy until mesh removal.  Subsequent ID notes reviewed and patient essentially had no further interventions on this area.  And so she remained on suppressive therapy.  When she transition from the [place] area she was seen at [facility] infectious disease and continued on the same regimen as well.  She presents now as she is establishing care with new providers and question remains about need for ongoing suppression.  I reviewed with the patient that given the ongoing mesh being present that she does run the risk of relapse and recurrence and what that may look like.  She does report in the past that her relapse is essentially acted as cyst developing on her abdominal wall and then later draining.  Was able to actually find culture data back from 2009 and her isolate in the past looks like it was also susceptible  to doxycycline.  Patient believes or thinks that she may have had that drug and had reaction but unfortunately I cannot find any documentation and she has multiple allergic reactions to potentially similar sounding drugs.  We discussed at this point maintaining suppression as she is otherwise tolerating.  We discussed though that if the patient stops tolerating suppression or can no longer take suppression for what ever reason that that would essentially be the time to stop.  We discussed however though that that could mean relapse and recurrence of her prior infection.  She does recall that when she had surgery at that time they were unable to entirely remove the mesh because some of it had integrated so deeply into the surrounding tissues.  Was able to review medications on the patient's list and we discussed potentially coming off of lisinopril given the combination of with Bactrim and her age and avoiding potential for developing hyperkalemia.  I let her know that I would reach out to her primary care provider with the same.  We discussed following up in our office every 3 to 4 months with monthly lab work until next visit and if otherwise stable after a year transitioning care back to her primary care provider as she had been doing in the past so as to reduce lab work but also overall visits and given her stability largely on antibiotics now since 2009.  Additional questions answered.  Office staff was able to check out the patient and arrange mobile labs for her.    REVIEW OF SYSTEMS:  A complete 12 point system-based review of systems is otherwise negative.    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HCC) 12/05/2023    MRSA (methicillin resistant Staphylococcus  aureus)     Partial small bowel obstruction (HCC) 2024    Pneumonia      Past Surgical History:   Procedure Laterality Date    APPENDECTOMY       SECTION      CHOLECYSTECTOMY      COLON SURGERY      DILATION AND CURETTAGE OF UTERUS      EXTRACTION, ERUPTED TOOTH REQUIRING REMOVAL OF BONE AND/OR SECTIONING OF TOOTH, AND INCLUDING ELEVATION OF MUCOPERIOSTEAL FLAP IF INDICATED N/A 2024    Procedure: EXTRACTION TEETH MULTIPLE -2,3,7,10, 14,19,29,30;  Surgeon: Livan Trotter DMD;  Location: [facility];  Service: Maxillofacial    EYE SURGERY      GASTRIC BYPASS      HERNIA REPAIR      several    HYSTERECTOMY      JOINT REPLACEMENT         FAMILY HISTORY:  Non-contributory    SOCIAL HISTORY:  Social History   Social History     Substance and Sexual Activity   Alcohol Use Never     Social History     Substance and Sexual Activity   Drug Use Never     Social History     Tobacco Use   Smoking Status Never   Smokeless Tobacco Never       ALLERGIES:  Allergies   Allergen Reactions    Alprazolam Other (See Comments) and Hives     ineffective  ineffective      Azithromycin Hives    Cephalexin Hives    Codeine Hyperactivity, Other (See Comments) and Hives     Hyperactivity and agitation  Hyperactivity and agitation      Diclofenac GI Intolerance    Diclofenac Sodium Itching    Duloxetine Other (See Comments) and Hives     Dizziness and confusion  Dizziness and confusion      Duloxetine Hcl Dizziness and Other (See Comments)    Erythromycin Hives    Fentanyl Itching    Hydrochlorothiazide Other (See Comments)     Other reaction(s): hypotensive    Hydromorphone Hives    Ibuprofen Other (See Comments) and GI Intolerance     Stomach pains  Stomach pains      Iodinated Contrast Media Hives    Iodine - Food Allergy Itching     CT dye    Latex Itching    Medical Tape Itching and Other (See Comments)      Paper tape - skin tears off      Meloxicam GI Intolerance    Methadone Other (See Comments) and Hives      agitation  agitation      Methylprednisolone Other (See Comments)     Raises BP  Raises blood pressure      Milnacipran Itching    Mirtazapine Other (See Comments) and Hives     hallucinations  hallucinations      Molds & Smuts Sneezing    Morphine Other (See Comments)     Agitation  Agitation if on for more than a few days  Agitation if on for more than a few days      Naloxone Other (See Comments)    Naproxen Other (See Comments) and GI Bleeding     Stomach pains  Stomach pain      Nefazodone Hives    Nitrofurantoin Hives    Oxycodone-Acetaminophen Itching and Hives     Agitation  Itchy and agitation      Pentazocine Other (See Comments)     Other reaction(s): did not help with pain    Pollen Extract Sneezing    Prednisone Other (See Comments)     hypertension  Tolerates steroidal inhalers  Tolerates steroidal inhalers      Pregabalin Other (See Comments)     Pt stated she is unsure why she can't take, just knows she cant take the med.  agitation      Prochlorperazine Other (See Comments)     Extreme agitation  Extreme agitation      Rofecoxib Other (See Comments)     Other reaction(s): ineffective    Sertraline Other (See Comments)     agitation  agitation      Tapentadol GI Intolerance    Amiloride Rash     Other reaction(s): hypotensive    Celecoxib Rash     Pt stated she is unsure why she can't take, just knows she cant take the med.      Clonazepam Rash    Penicillins Hives and Rash       MEDICATIONS:  All current active medications have been reviewed.  Antibiotics: bactrim suppression     PHYSICAL EXAM:  Vitals:    02/26/25 1116   BP: 118/68   BP Location: Left arm   Patient Position: Sitting   Cuff Size: Standard   Pulse: 61   Temp: 98.1 °F (36.7 °C)   TempSrc: Temporal   SpO2: 98%   Weight: 107 kg (235 lb)         General Appearance:  Appearing well, nontoxic, and in no distress   Head:  Normocephalic, without obvious abnormality, atraumatic   Eyes:  Conjunctiva pink and sclera anicteric, both eyes  "  Abdomen:   Patient has visible abdominal wall defects without any active drainage currently.   Extremities: No cyanosis, clubbing or edema   Skin: No other rashes or lesions. No other draining wounds noted.   Neurologic: Alert and oriented times 3, able to ambulate with assistive device.       LABS, IMAGING, & OTHER STUDIES:  In completing this consult I have performed an extensive review of the medical records in epic including review of the notes, radiographs, and laboratory results as detailed below.     Lab Results:  I have personally reviewed pertinent labs.  Comments/Interpretations: Recent labs from December with creatinine of 1.0 and CBC and potassium unremarkable.    Lab Results   Component Value Date    K 4.7 12/05/2024     12/05/2024    CO2 20 (L) 12/05/2024    BUN 31 (H) 12/05/2024    CREATININE 1.02 12/05/2024    GLUF 95 05/26/2024    CALCIUM 9.6 12/05/2024    AST 18 12/05/2024    ALT 11 12/05/2024    ALKPHOS 121 (H) 12/05/2024    EGFR 52 12/05/2024     Lab Results   Component Value Date    WBC 6.04 12/05/2024    HGB 11.0 (L) 12/05/2024    HCT 34.7 (L) 12/05/2024    MCV 96 12/05/2024     12/05/2024   No results found for: \"SEDRATE\"      Imaging Studies:   I have personally reviewed pertinent imaging study reports and images in PACS.  Comments/Interpretations:  Most recent CT of the abdomen was in May 2024 and the previous site of the hernia repair noted but no other acute changes in the abdominal wall per se.    Other Studies:   I have personally reviewed pertinent reports.  Records in CareEverywhere or Scanned records: Was able to review extensive records in Care Everywhere from various ID providers and [facility]    Nursing home/EMS Records: None    Current/Prior Cultures: Patient does have a single culture from [facility] in April that showed her MRSA isolate susceptibilities to both doxycycline and Bactrim.  Isolate was clindamycin " resistant.      Administrative Statements   I have spent a total time of 62 minutes in caring for this patient on the day of the visit/encounter including Diagnostic results, Impressions, Documenting in the medical record, Reviewing/placing orders in the medical record (including tests, medications, and/or procedures), Obtaining or reviewing history  , and Communicating with other healthcare professionals .

## 2025-02-27 LAB
6MAM UR QL CFM: NEGATIVE NG/ML
7AMINOCLONAZEPAM UR QL CFM: NEGATIVE NG/ML
A-OH ALPRAZ UR QL CFM: NEGATIVE NG/ML
AMPHET UR QL CFM: NEGATIVE NG/ML
AMPHET UR QL CFM: NEGATIVE NG/ML
BUPRENORPHINE UR QL CFM: NEGATIVE NG/ML
BUTALBITAL UR QL CFM: NEGATIVE NG/ML
BZE UR QL CFM: NEGATIVE NG/ML
CODEINE UR QL CFM: NEGATIVE NG/ML
DESIPRAMINE UR QL CFM: NEGATIVE NG/ML
DESIPRAMINE UR QL CFM: NEGATIVE NG/ML
EDDP UR QL CFM: NEGATIVE NG/ML
ETHYL GLUCURONIDE UR QL CFM: NEGATIVE NG/ML
ETHYL SULFATE UR QL SCN: NEGATIVE NG/ML
EUTYLONE UR QL: NEGATIVE NG/ML
FENTANYL UR QL CFM: NEGATIVE NG/ML
GLIADIN IGG SER IA-ACNC: NEGATIVE NG/ML
GLUCOSE 30M P 50 G LAC PO SERPL-MCNC: NEGATIVE NG/ML
HYDROCODONE UR QL CFM: NEGATIVE NG/ML
HYDROCODONE UR QL CFM: NEGATIVE NG/ML
HYDROMORPHONE UR QL CFM: NEGATIVE NG/ML
IMIPRAMINE UR QL CFM: NEGATIVE NG/ML
LORAZEPAM UR QL CFM: NORMAL NG/ML
MDMA UR QL CFM: NEGATIVE NG/ML
ME-PHENIDATE UR QL CFM: NEGATIVE NG/ML
MEPERIDINE UR QL CFM: NEGATIVE NG/ML
METHADONE UR QL CFM: NEGATIVE NG/ML
METHAMPHET UR QL CFM: NEGATIVE NG/ML
MORPHINE UR QL CFM: NEGATIVE NG/ML
MORPHINE UR QL CFM: NEGATIVE NG/ML
NORBUPRENORPHINE UR QL CFM: NEGATIVE NG/ML
NORDIAZEPAM UR QL CFM: NEGATIVE NG/ML
NORFENTANYL UR QL CFM: NEGATIVE NG/ML
NORHYDROCODONE UR QL CFM: NEGATIVE NG/ML
NORHYDROCODONE UR QL CFM: NEGATIVE NG/ML
NORMEPERIDINE UR QL CFM: NEGATIVE NG/ML
NOROXYCODONE UR QL CFM: NEGATIVE NG/ML
OLANZAPINE QUANTIFICATION: NEGATIVE NG/ML
OPC-3373 QUANTIFICATION: NEGATIVE NG/ML
OXAZEPAM UR QL CFM: NEGATIVE NG/ML
OXYCODONE UR QL CFM: NEGATIVE NG/ML
OXYMORPHONE UR QL CFM: NEGATIVE NG/ML
OXYMORPHONE UR QL CFM: NEGATIVE NG/ML
PARA-FLUOROFENTANYL QUANTIFICATION: NORMAL NG/ML
PCP UR QL CFM: NEGATIVE NG/ML
PHENOBARB UR QL CFM: NEGATIVE NG/ML
RESULT ALL_PRESCRIBED MEDS AND SPECIAL INSTRUCTIONS: NORMAL
SECOBARBITAL UR QL CFM: NEGATIVE NG/ML
SL AMB 5F-ADB-M7 METABOLITE QUANTIFICATION: NEGATIVE NG/ML
SL AMB 7-OH-MITRAGYNINE (KRATOM ALKALOID) QUANTIFICATION: NEGATIVE NG/ML
SL AMB AB-FUBINACA-M3 METABOLITE QUANTIFICATION: NEGATIVE NG/ML
SL AMB ACETYL FENTANYL QUANTIFICATION: NORMAL NG/ML
SL AMB ACETYL NORFENTANYL QUANTIFICATION: NORMAL NG/ML
SL AMB ACRYL FENTANYL QUANTIFICATION: NORMAL NG/ML
SL AMB CARFENTANIL QUANTIFICATION: NORMAL NG/ML
SL AMB CLOZAPINE QUANTIFICATION: NEGATIVE NG/ML
SL AMB CTHC (MARIJUANA METABOLITE) QUANTIFICATION: NEGATIVE NG/ML
SL AMB DEXTROMETHORPHAN QUANTIFICATION: NEGATIVE NG/ML
SL AMB DEXTRORPHAN (DEXTROMETHORPHAN METABOLITE) QUANT: NEGATIVE NG/ML
SL AMB DEXTRORPHAN (DEXTROMETHORPHAN METABOLITE) QUANT: NEGATIVE NG/ML
SL AMB HALOPERIDOL  QUANTIFICATION: NEGATIVE NG/ML
SL AMB HALOPERIDOL METABOLITE QUANTIFICATION: NEGATIVE NG/ML
SL AMB HYDROXYRISPERIDONE QUANTIFICATION: NEGATIVE NG/ML
SL AMB JWH018 METABOLITE QUANTIFICATION: NEGATIVE NG/ML
SL AMB JWH073 METABOLITE QUANTIFICATION: NEGATIVE NG/ML
SL AMB MDMB-FUBINACA-M1 METABOLITE QUANTIFICATION: NEGATIVE NG/ML
SL AMB METHYLONE QUANTIFICATION: NEGATIVE NG/ML
SL AMB N-DESMETHYL-TRAMADOL QUANTIFICATION: ABNORMAL NG/ML
SL AMB N-DESMETHYLCLOZAPINE QUANTIFICATION: NEGATIVE NG/ML
SL AMB NORQUETIAPINE QUANTIFICATION: NEGATIVE NG/ML
SL AMB PHENTERMINE QUANTIFICATION: NEGATIVE NG/ML
SL AMB QUETIAPINE QUANTIFICATION: NEGATIVE NG/ML
SL AMB RCS4 METABOLITE QUANTIFICATION: NEGATIVE NG/ML
SL AMB RISPERIDONE QUANTIFICATION: NEGATIVE NG/ML
SL AMB RITALINIC ACID QUANTIFICATION: NEGATIVE NG/ML
SPECIMEN DRAWN SERPL: NEGATIVE NG/ML
TAPENTADOL UR QL CFM: NEGATIVE NG/ML
TEMAZEPAM UR QL CFM: NEGATIVE NG/ML
TEMAZEPAM UR QL CFM: NEGATIVE NG/ML
TRAMADOL UR QL CFM: ABNORMAL NG/ML
URATE/CREAT 24H UR: ABNORMAL NG/ML

## 2025-02-27 NOTE — TELEPHONE ENCOUNTER
2/27 - called pt @1139am - unable to leave a message - VM has not been set up - will try back later

## 2025-03-10 ENCOUNTER — TELEPHONE (OUTPATIENT)
Dept: PAIN MEDICINE | Facility: CLINIC | Age: 80
End: 2025-03-10

## 2025-03-10 DIAGNOSIS — F32.A DEPRESSION, UNSPECIFIED DEPRESSION TYPE: ICD-10-CM

## 2025-03-11 RX ORDER — VENLAFAXINE 75 MG/1
75 TABLET ORAL DAILY
Qty: 30 TABLET | Refills: 5 | Status: SHIPPED | OUTPATIENT
Start: 2025-03-11

## 2025-03-13 ENCOUNTER — APPOINTMENT (OUTPATIENT)
Dept: LAB | Facility: HOSPITAL | Age: 80
End: 2025-03-13
Payer: COMMERCIAL

## 2025-03-13 DIAGNOSIS — A49.02 MRSA INFECTION: ICD-10-CM

## 2025-03-13 DIAGNOSIS — T85.79XD INFECTED PROSTHETIC MESH OF ABDOMINAL WALL, SUBSEQUENT ENCOUNTER: ICD-10-CM

## 2025-03-13 LAB
ANION GAP SERPL CALCULATED.3IONS-SCNC: 10 MMOL/L (ref 4–13)
BASOPHILS # BLD AUTO: 0.03 THOUSANDS/ÂΜL (ref 0–0.1)
BASOPHILS NFR BLD AUTO: 1 % (ref 0–1)
BUN SERPL-MCNC: 36 MG/DL (ref 5–25)
CALCIUM SERPL-MCNC: 9.2 MG/DL (ref 8.4–10.2)
CHLORIDE SERPL-SCNC: 107 MMOL/L (ref 96–108)
CHOLEST SERPL-MCNC: 188 MG/DL (ref ?–200)
CO2 SERPL-SCNC: 21 MMOL/L (ref 21–32)
CREAT SERPL-MCNC: 1.19 MG/DL (ref 0.6–1.3)
EOSINOPHIL # BLD AUTO: 0.07 THOUSAND/ÂΜL (ref 0–0.61)
EOSINOPHIL NFR BLD AUTO: 1 % (ref 0–6)
ERYTHROCYTE [DISTWIDTH] IN BLOOD BY AUTOMATED COUNT: 15 % (ref 11.6–15.1)
GFR SERPL CREATININE-BSD FRML MDRD: 43 ML/MIN/1.73SQ M
GLUCOSE SERPL-MCNC: 91 MG/DL (ref 65–140)
HCT VFR BLD AUTO: 32.8 % (ref 34.8–46.1)
HDLC SERPL-MCNC: 53 MG/DL
HGB BLD-MCNC: 10.2 G/DL (ref 11.5–15.4)
IMM GRANULOCYTES # BLD AUTO: 0.02 THOUSAND/UL (ref 0–0.2)
IMM GRANULOCYTES NFR BLD AUTO: 0 % (ref 0–2)
LDLC SERPL CALC-MCNC: 110 MG/DL (ref 0–100)
LYMPHOCYTES # BLD AUTO: 1.25 THOUSANDS/ÂΜL (ref 0.6–4.47)
LYMPHOCYTES NFR BLD AUTO: 25 % (ref 14–44)
MCH RBC QN AUTO: 30.9 PG (ref 26.8–34.3)
MCHC RBC AUTO-ENTMCNC: 31.1 G/DL (ref 31.4–37.4)
MCV RBC AUTO: 99 FL (ref 82–98)
MONOCYTES # BLD AUTO: 0.46 THOUSAND/ÂΜL (ref 0.17–1.22)
MONOCYTES NFR BLD AUTO: 9 % (ref 4–12)
NEUTROPHILS # BLD AUTO: 3.26 THOUSANDS/ÂΜL (ref 1.85–7.62)
NEUTS SEG NFR BLD AUTO: 64 % (ref 43–75)
NRBC BLD AUTO-RTO: 0 /100 WBCS
PLATELET # BLD AUTO: 197 THOUSANDS/UL (ref 149–390)
PMV BLD AUTO: 10.1 FL (ref 8.9–12.7)
POTASSIUM SERPL-SCNC: 4.9 MMOL/L (ref 3.5–5.3)
RBC # BLD AUTO: 3.3 MILLION/UL (ref 3.81–5.12)
SODIUM SERPL-SCNC: 138 MMOL/L (ref 135–147)
TRIGL SERPL-MCNC: 124 MG/DL (ref ?–150)
WBC # BLD AUTO: 5.09 THOUSAND/UL (ref 4.31–10.16)

## 2025-03-13 PROCEDURE — 36415 COLL VENOUS BLD VENIPUNCTURE: CPT

## 2025-03-13 PROCEDURE — 80048 BASIC METABOLIC PNL TOTAL CA: CPT

## 2025-03-13 PROCEDURE — 85025 COMPLETE CBC W/AUTO DIFF WBC: CPT

## 2025-03-24 NOTE — PROGRESS NOTES
Assessment:  1. Lumbar spondylosis    2. Spinal stenosis of lumbar region, unspecified whether neurogenic claudication present    3. Cervical spinal stenosis    4. Chronic pain syndrome    5. Uncomplicated opioid dependence (HCC)    6. Myofascial pain syndrome    7. Depression, unspecified depression type        Plan:  The patient is a 80 y.o. female last seen on 2/25/2025 who presents for a follow up office visit in regards to chronic pain secondary to neck pain, cervical stenosis, low back pain, lumbar stenosis and lumbar radiculopathy.  Patient presents today with neck and low back pain.  She is taking methocarbamol 500 mg 1 tablet twice a day as needed, which is providing pain relief.  She was previously prescribed tramadol through [facility] but stopped attending the program.  Urine drug screen was performed at the last office visit and appropriate.  We will take over prescribing the tramadol. An opioid contract was reviewed with the patient. The patient was made aware they are only to receive opioid medication from our office, and must take the medication as prescribed. If the medication is lost or stolen, it will not be replaced. We also do not condone the use of illegal substances or alcohol with opioid medication. Random urine drug screens and pill counts will also be performed at office visits. Lastly, the patient was informed that office visits are needed for refills. Patient was agreeable and signed the contract.    A prescription for tramadol 50 mg 1 tablet daily #30 sent to the pharmacy.  If she uses the medication sparingly, 1 prescription should last 3 months.    She will also continue on methocarbamol 500 mg as prescribed and refills were sent to the pharmacy.    Patient takes lorazepam 1 mg 3 times a day as needed for depression.  We did discuss the risk of taking an opioid medication as a day as a pain.  She was instructed not to take both medications together.  I will also prescribe her Narcan  nasal spray in the event of opioid emergency.  We reviewed the medication and how to use it.  Prescription was sent to the pharmacy.    Patient was given a yearly screening questionaries to complete today that includes a BECKS Depression inventory, SOАНДРЕЙ, & COMM.    Lastly, the patient asked for referral to behavioral health, since she had not been able to establish care since moving. She had depression. A referral was placed    [place] Prescription Drug Monitoring Program report was reviewed and was appropriate     There are risks associated with opioid medications, including dependence, addiction and tolerance. The patient understands and agrees to use these medications only as prescribed. Potential side effects of the medications include, but are not limited to, constipation, drowsiness, addiction, impaired judgment and risk of fatal overdose if not taken as prescribed. The patient was warned against driving while taking sedation medications.  Sharing medications is a felony. At this point in time, the patient is showing no signs of addiction, abuse, diversion or suicidal ideation.      The patient will follow-up in 12 weeks for medication prescription refill and reevaluation. The patient was advised to contact the office should their symptoms worsen in the interim. The patient was agreeable and verbalized an understanding.        History of Present Illness:    The patient is a 80 y.o. female last seen on 2/25/2025 who presents for a follow up office visit in regards to chronic pain secondary to neck pain, cervical stenosis, low back pain, lumbar stenosis and lumbar radiculopathy.  The patient's last office visit was February 25, 2025, in which she was continued on methocarbamol and discussions to take over the tramadol occurred.  Patient presents today with ongoing neck and low back pain.  The neck pain radiates into the right arm while the low back pain radiates into both legs.She also has multiple  arthralgias.  The pain is constant and described as burning, dull aching, cramping, and shooting.  She does feel the pain interferes with her activities of daily living.  She is currently not performing any conservative treatment options such as physical therapy, chiropractic therapy, acupuncture, or home exercise programs.    Patient is currently prescribed methocarbamol 500 mg 1 tablet twice a day, which she takes as needed. She was previously prescribed tramadol 50 mg 1 tab 1-2 times a day through a [facility] program.  The patient had left and she is hoping our office can take over prescribing this. She takes the medication sparingly and only when having severe pain.  She had completed a urine drug screen at the last office visit which was negative for tramadol otherwise appropriate.  PDMP showed her last prescription was filled in August 2024.    Patient has failed multiple medications in the past including gabapentin, Lyrica, morphine, Norco, oxycodone, hydrocodone, and codeine.  She is unable to take NSAIDs due to gastric bypass and kidney disease.    Patient had seen rheumatology in the past.  She was diagnosed with osteoarthritis and fibromyalgia    Opioid contract: 3/26/25  Urine drug screen:2/25/25  Narcan prescribed: 3/26/25  BECKS: 3/26/25  I have personally reviewed and/or updated the patient's past medical history, past surgical history, family history, social history, current medications, allergies, and vital signs today.       Review of Systems:    Review of Systems   Respiratory:  Negative for shortness of breath.    Cardiovascular:  Negative for chest pain.   Gastrointestinal:  Negative for constipation, diarrhea, nausea and vomiting.   Musculoskeletal:  Negative for arthralgias, gait problem, joint swelling and myalgias.   Skin:  Negative for rash.   Neurological:  Negative for dizziness, seizures and weakness.   All other systems reviewed and are negative.        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    Depression     History of transfusion     Hypertension     Inflammation of sacroiliac joint (HCC) 2023    MRSA (methicillin resistant Staphylococcus aureus)     Partial small bowel obstruction (HCC) 2024    Pneumonia        Past Surgical History:   Procedure Laterality Date    APPENDECTOMY       SECTION      CHOLECYSTECTOMY      COLON SURGERY      DILATION AND CURETTAGE OF UTERUS      EXTRACTION, ERUPTED TOOTH REQUIRING REMOVAL OF BONE AND/OR SECTIONING OF TOOTH, AND INCLUDING ELEVATION OF MUCOPERIOSTEAL FLAP IF INDICATED N/A 2024    Procedure: EXTRACTION TEETH MULTIPLE -2,3,7,10, 14,19,29,30;  Surgeon: Livan Trotter DMD;  Location: [facility];  Service: Maxillofacial    EYE SURGERY      GASTRIC BYPASS      HERNIA REPAIR      several    HYSTERECTOMY      JOINT REPLACEMENT         History reviewed. No pertinent family history.    Social History     Occupational History    Not on file   Tobacco Use    Smoking status: Never    Smokeless tobacco: Never   Vaping Use    Vaping status: Never Used   Substance and Sexual Activity    Alcohol use: Never    Drug use: Never    Sexual activity: Not Currently         Current Outpatient Medications:     atenolol (TENORMIN) 50 mg tablet, One and half tablet a day, Disp: 45 tablet, Rfl: 5    Cholecalciferol 125 MCG (5000 UT) capsule, Take 10,000 Units by mouth every 30 (thirty) days, Disp: , Rfl:     diphenhydrAMINE (Benadryl Allergy) 25 mg tablet, Take by mouth if needed, Disp: , Rfl:     fluticasone (Flonase Allergy Relief) 50 mcg/act nasal spray, into each nostril, Disp: , Rfl:     hydroCHLOROthiazide 12.5 mg tablet, Take 1 tablet (12.5 mg total) by mouth daily, Disp: 30 tablet, Rfl: 5    latanoprost (XALATAN) 0.005 % ophthalmic solution, Apply 1 drop to eye, Disp: , Rfl:     lisinopril (ZESTRIL) 10 mg  tablet, Take 1 pill a day, Disp: 30 tablet, Rfl: 5    LORazepam (ATIVAN) 1 mg tablet, Take 1 tablet (1 mg total) by mouth 3 (three) times a day as needed for anxiety, Disp: 90 tablet, Rfl: 0    methocarbamol (ROBAXIN) 500 mg tablet, Take 1 tablet (500 mg total) by mouth 2 (two) times a day as needed for muscle spasms, Disp: 60 tablet, Rfl: 2    naloxone (NARCAN) 4 mg/0.1 mL nasal spray, Administer 1 spray into a nostril. If no response after 2-3 minutes, give another dose in the other nostril using a new spray., Disp: 1 each, Rfl: 1    ondansetron (ZOFRAN) 4 mg tablet, Take 1 tablet (4 mg total) by mouth every 6 (six) hours as needed for nausea or vomiting, Disp: 12 tablet, Rfl: 0    sulfamethoxazole-trimethoprim (BACTRIM DS) 800-160 mg per tablet, Take 1 tablet by mouth every 12 (twelve) hours, Disp: 60 tablet, Rfl: 2    traMADol (ULTRAM) 50 mg tablet, Take 1 tablet (50 mg total) by mouth daily as needed for severe pain, Disp: 30 tablet, Rfl: 0    venlafaxine (EFFEXOR) 75 mg tablet, TAKE 1 TABLET (75 MG TOTAL) BY MOUTH IN THE MORNING, Disp: 30 tablet, Rfl: 5    venlafaxine (EFFEXOR-XR) 150 mg 24 hr capsule, Take 1 capsule (150 mg total) by mouth daily, Disp: 30 capsule, Rfl: 3    Allergies   Allergen Reactions    Alprazolam Other (See Comments) and Hives     ineffective  ineffective      Azithromycin Hives    Cephalexin Hives    Codeine Hyperactivity, Other (See Comments) and Hives     Hyperactivity and agitation  Hyperactivity and agitation      Diclofenac GI Intolerance    Diclofenac Sodium Itching    Duloxetine Other (See Comments) and Hives     Dizziness and confusion  Dizziness and confusion      Duloxetine Hcl Dizziness and Other (See Comments)    Erythromycin Hives    Fentanyl Itching    Hydrochlorothiazide Other (See Comments)     Other reaction(s): hypotensive    Hydromorphone Hives    Ibuprofen Other (See Comments) and GI Intolerance     Stomach pains  Stomach pains      Iodinated Contrast Media Hives     "Iodine - Food Allergy Itching     CT dye    Latex Itching    Medical Tape Itching and Other (See Comments)      Paper tape - skin tears off      Meloxicam GI Intolerance    Methadone Other (See Comments) and Hives     agitation  agitation      Methylprednisolone Other (See Comments)     Raises BP  Raises blood pressure      Milnacipran Itching    Mirtazapine Other (See Comments) and Hives     hallucinations  hallucinations      Molds & Smuts Sneezing    Morphine Other (See Comments)     Agitation  Agitation if on for more than a few days  Agitation if on for more than a few days      Naloxone Other (See Comments)    Naproxen Other (See Comments) and GI Bleeding     Stomach pains  Stomach pain      Nefazodone Hives    Nitrofurantoin Hives    Oxycodone-Acetaminophen Itching and Hives     Agitation  Itchy and agitation      Pentazocine Other (See Comments)     Other reaction(s): did not help with pain    Pollen Extract Sneezing    Prednisone Other (See Comments)     hypertension  Tolerates steroidal inhalers  Tolerates steroidal inhalers      Pregabalin Other (See Comments)     Pt stated she is unsure why she can't take, just knows she cant take the med.  agitation      Prochlorperazine Other (See Comments)     Extreme agitation  Extreme agitation      Rofecoxib Other (See Comments)     Other reaction(s): ineffective    Sertraline Other (See Comments)     agitation  agitation      Tapentadol GI Intolerance    Amiloride Rash     Other reaction(s): hypotensive    Celecoxib Rash     Pt stated she is unsure why she can't take, just knows she cant take the med.      Clonazepam Rash    Penicillins Hives and Rash       Physical Exam:    Ht 4' 9\" (1.448 m)   Wt 107 kg (235 lb)   BMI 50.85 kg/m²     Constitutional:normal, well developed, well nourished, alert, in no distress and non-toxic and no overt pain behavior.  Eyes:anicteric  HEENT:grossly intact  Neck:supple, symmetric, trachea midline and no masses   Pulmonary:even " and unlabored  Cardiovascular:No edema or pitting edema present  Skin:Normal without rashes or lesions and well hydrated  Psychiatric:Mood and affect appropriate  Neurologic:Cranial Nerves II-XII grossly intact  Musculoskeletal:normal      Imaging  Narrative & Impression      CT LUMBAR SPINE WITHOUT CONTRAST 1/30/25     INDICATION:   Back pain and trauma.     COMPARISON: None.     TECHNIQUE:  Contiguous axial images through the lumbar spine were obtained. Sagittal and coronal reconstructions were performed.     IV Contrast:     Radiation dose length product (DLP) for this visit:  1360 mGy-cm .  This examination, like all CT scans performed in the [facility], was performed utilizing techniques to minimize radiation dose exposure, including the use of iterative   reconstruction and automated exposure control.     IMAGE QUALITY:  Diagnostic.     FINDINGS:     ALIGNMENT: Lumbar levoscoliosis centered at L2 and compensatory dextroscoliosis at L4-5. L2 hemangioma.     VERTEBRAE:  No evidence of acute lumbar spine fracture.     DEGENERATIVE CHANGES:     Disc and facet osteophytosis throughout the lower thoracic and lumbar spine resulting in mild to moderate central canal stenosis and moderate to severe neural foraminal narrowing at L4-5 and L5-S1     PARASPINAL SOFT TISSUES: No hematoma.     OTHER: None.     IMPRESSION:     No evidence of acute lumbar spine fracture.       No orders to display         Orders Placed This Encounter   Procedures    Ambulatory referral to Psych Services

## 2025-03-26 ENCOUNTER — OFFICE VISIT (OUTPATIENT)
Dept: PAIN MEDICINE | Facility: CLINIC | Age: 80
End: 2025-03-26
Payer: COMMERCIAL

## 2025-03-26 VITALS — HEIGHT: 57 IN | WEIGHT: 235 LBS | BODY MASS INDEX: 50.7 KG/M2

## 2025-03-26 DIAGNOSIS — G89.4 CHRONIC PAIN SYNDROME: ICD-10-CM

## 2025-03-26 DIAGNOSIS — M47.816 LUMBAR SPONDYLOSIS: ICD-10-CM

## 2025-03-26 DIAGNOSIS — F11.20 UNCOMPLICATED OPIOID DEPENDENCE (HCC): ICD-10-CM

## 2025-03-26 DIAGNOSIS — F32.A DEPRESSION, UNSPECIFIED DEPRESSION TYPE: ICD-10-CM

## 2025-03-26 DIAGNOSIS — M48.02 CERVICAL SPINAL STENOSIS: ICD-10-CM

## 2025-03-26 DIAGNOSIS — M48.061 SPINAL STENOSIS OF LUMBAR REGION, UNSPECIFIED WHETHER NEUROGENIC CLAUDICATION PRESENT: ICD-10-CM

## 2025-03-26 DIAGNOSIS — M79.18 MYOFASCIAL PAIN SYNDROME: ICD-10-CM

## 2025-03-26 PROCEDURE — 99214 OFFICE O/P EST MOD 30 MIN: CPT | Performed by: NURSE PRACTITIONER

## 2025-03-26 RX ORDER — TRAMADOL HYDROCHLORIDE 50 MG/1
50 TABLET ORAL DAILY PRN
Qty: 30 TABLET | Refills: 0 | Status: SHIPPED | OUTPATIENT
Start: 2025-03-26

## 2025-03-26 RX ORDER — METHOCARBAMOL 500 MG/1
500 TABLET, FILM COATED ORAL 2 TIMES DAILY PRN
Qty: 60 TABLET | Refills: 2 | Status: SHIPPED | OUTPATIENT
Start: 2025-03-26

## 2025-03-26 NOTE — PATIENT INSTRUCTIONS
"Patient Education     Taking opioids safely   The Basics   Written by the doctors and editors at [facility]   What are opioids? -- Opioids are a group of prescription medicines that relieve pain. They work by attaching to \"opioid receptors\" in the body and blocking pain signals.  Opioids are sometimes used when other types of pain medicine do not help enough. Opioids can be helpful for treating short-term, or \"acute,\" pain, like after surgery or an injury. They are also sometimes used to treat long-term, or \"chronic,\" pain, like for people with cancer. But they come with risks.  If your doctor prescribes an opioid medicine, it's important to understand the risks and know how to stay safe.  What are the risks of taking opioids? -- You should know that:   Opioids have side effects. Some are just bothersome, and some can be dangerous. For example, taking too much of an opioid is called an \"overdose.\" An overdose can cause serious problems and even death.   In some cases, taking opioids can lead to misuse. For example, people might take the medicine when they don't need it for pain. Or they might take more than they are supposed to. Sharing or selling opioids are other examples of misuse.   There is a risk of addiction. This is also called \"opioid use disorder.\"  If you take too much, or take opioids with alcohol or certain other drugs, it can cause serious harm. It can even cause death from overdose.  How do I stay safe? -- There are things you can do to stay safe if you need to take an opioid medicine (figure 1). These things help protect yourself and others.  Know your medicines:    Opioids come in different forms. \"Immediate-release\" medicines work quickly and last for a short time. \"Extended-release\" medicines work more slowly and last longer. Make sure that you know what type of opioid you have. Read the label and the information that comes with your prescription.   Follow your treatment plan carefully. Take only " "the dose your doctor prescribes, and only as often as they tell you to.   Never take opioids that were not prescribed to you.   Some opioids come combined with other medicines like acetaminophen or an \"NSAID\" (like ibuprofen). Do not take any extra NSAIDs or acetaminophen without talking to your doctor first.   Make sure that all of your doctors know every medicine you take, even those that are non-prescription. Some medicines can affect the way opioids work. Bring a complete list of all of your pain medicines and other medicines with you whenever you go to a doctor, nurse, dentist, or pharmacist.   Ask your doctor or pharmacist if it is safe to take your other medicines with your opioid medicine.  Use and store your medicine safely:    Do not drink alcohol while you are taking opioids.   Do not take opioids with medicines that make you sleepy, unless your doctor tells you to. Examples include:   \"Benzodiazepines\" like diazepam (sample brand name: Valium) or alprazolam (sample brand name: Xanax)   Gabapentin (sample brand name: Neurontin) or pregabalin (brand name: Lyrica)   Muscle relaxants like baclofen or cyclobenzaprine   Sleeping pills like zolpidem (sample brand name: Ambien)   Talk to your doctor about whether it is safe to drive. Opioids can make you feel tired or have trouble thinking clearly. If you are starting a new prescription or taking a higher dose, you might need to avoid things like driving, using dangerous machinery, or other activities that could be risky.   Store your opioids in a safe place, such as a locked cabinet. This prevents children, teens, or anyone else from getting to them.   Never share your opioids with other people.  Be aware of side effects:    Opioid medicines can cause side effects. There are often ways to prevent or treat these.   Call your doctor or nurse if you have side effects that bother you, such as:   Constipation - Your doctor or nurse might suggest that you take a " "laxative to prevent or treat constipation. If your bowel movements are hard and dry, a stool softener might help. Drink plenty of water, and try to get regular physical activity.   Mild nausea or stomach discomfort - Taking the medicine with or after food can help with this. Nausea usually gets better with time.   Severe nausea, vomiting, or itchiness - If you have any of these problems, your doctor might be able to switch you to a different medicine.   Dry mouth   Feeling dizzy or sleepy, or having trouble thinking clearly   Vision problems   Being clumsy or falling down   Know the signs of an opioid overdose. Get help right away if you think that you or someone else took too much of an opioid medicine. Signs of an overdose are listed below.  Stay safe when stopping your opioid medicine:    When opioids are needed to treat acute pain, doctors usually try to prescribe them for only a short time. This usually means a few days or a week. They also prescribe the lowest dose possible to relieve pain.   Follow your doctor's instructions about how to stop taking your opioid once your pain has improved. This usually involves \"tapering,\" or reducing the dose gradually. If you stop an opioid suddenly, this can cause unpleasant symptoms like stomach ache, diarrhea, or shakes. This is called \"withdrawal.\" Tapering the dose can help prevent withdrawal.   When your pain gets better, get rid of any leftover medicines. Your doctor, nurse, or pharmacist can suggest ways to get rid of them. This might involve flushing them down the toilet or mixing them with something like dirt or cat litter, then putting the mixture in a sealed container in the trash. Some police stations and pharmacies also take leftover medicines.  What is naloxone? -- Naloxone is a medicine that reverses the effects of opioids. It can prevent death from an opioid overdose. Naloxone comes as an injection (shot), or as a spray that goes in the nose. Naloxone nasal " spray (brand name: Narcan) is available without a prescription.  If you or someone you know uses opioids, it's a good idea to keep naloxone with you. Make sure that you and your family and friends know how and when to use it.  When should I call for help? -- If you are taking an opioid, it's important to know when to get help. Signs of an opioid overdose include:   Extreme sleepiness   Slow breathing, or no breathing at all   Very small pupils (the black circles in the center of the eyes)   Very slow heartbeat  If you took too much of your opioid medicine or think that someone is having an opioid overdose:   If you have naloxone, give it immediately. Naloxone can save a person's life. But it needs to be given as soon as possible.   Call for an ambulance right away (in the [place] and [place], call 9-1-1).  Call your doctor or nurse if:   You are having side effects that bother you.   You have questions about how to take your medicine.   You are having trouble managing your pain.  All topics are updated as new evidence becomes available and our peer review process is complete.  This topic retrieved from [employer] on: May 15, 2024.  Topic 273460 Version 1.0  Release: 32.4.3 - C32.134  © 2024 UpToDate, Inc. and/or its affiliates. All rights reserved.  figure 1: Tips for medication safety     Tips to use your medicine safely include:  (A) Read labels so you know how to take your medicines.  (B) Have a routine for taking your medicines.  (C) Make sure you know what foods, drinks, and other medicines are safe for you.  (D) Store medicines in a safe place.  (E) Work with your health care team and ask questions if you have them.  Graphic 227637 Version 2.0  Consumer Information Use and Disclaimer   Disclaimer: This generalized information is a limited summary of diagnosis, treatment, and/or medication information. It is not meant to be comprehensive and should be used as a tool to help the user understand and/or assess potential  diagnostic and treatment options. It does NOT include all information about conditions, treatments, medications, side effects, or risks that may apply to a specific patient. It is not intended to be medical advice or a substitute for the medical advice, diagnosis, or treatment of a health care provider based on the health care provider's examination and assessment of a patient's specific and unique circumstances. Patients must speak with a health care provider for complete information about their health, medical questions, and treatment options, including any risks or benefits regarding use of medications. This information does not endorse any treatments or medications as safe, effective, or approved for treating a specific patient. UpToDate, Inc. and its affiliates disclaim any warranty or liability relating to this information or the use thereof.The use of this information is governed by the Terms of Use, available at https://www.[employer].com/en/know/clinical-effectiveness-terms. 2024© UpToDate, Inc. and its affiliates and/or licensors. All rights reserved.  Copyright   © 2024 UpToDate, Inc. and/or its affiliates. All rights reserved.

## 2025-03-27 ENCOUNTER — TELEPHONE (OUTPATIENT)
Age: 80
End: 2025-03-27

## 2025-03-27 DIAGNOSIS — F32.A DEPRESSION, UNSPECIFIED DEPRESSION TYPE: ICD-10-CM

## 2025-03-27 RX ORDER — VENLAFAXINE HYDROCHLORIDE 150 MG/1
150 CAPSULE, EXTENDED RELEASE ORAL DAILY
Qty: 30 CAPSULE | Refills: 3 | Status: SHIPPED | OUTPATIENT
Start: 2025-03-27

## 2025-03-27 NOTE — TELEPHONE ENCOUNTER
Pt calling to verify appt on 4/9/25 with Vasiliy David. Writer verified. Pt then asked if we take her insurance. Writer verified her insurance and informed that we could not schedule her if her insurance would be oon. Pt asked for address to the [place] office and writer gave to pt.

## 2025-04-02 ENCOUNTER — TELEPHONE (OUTPATIENT)
Age: 80
End: 2025-04-02

## 2025-04-02 NOTE — TELEPHONE ENCOUNTER
ROSA Yo from [facility] calling to report some tests that she did with patient. Warm transfer to [facility].

## 2025-04-02 NOTE — TELEPHONE ENCOUNTER
ROSA Alonzo  called regarding her assessment today at patients home for peripheral arterial circulation was rt leg 0.27 severly decreased left leg 0.45  moderate decreased  . Np has reported abnormal findings  and pt is asymptomatic

## 2025-04-03 DIAGNOSIS — F32.A DEPRESSION, UNSPECIFIED DEPRESSION TYPE: ICD-10-CM

## 2025-04-04 RX ORDER — LORAZEPAM 1 MG/1
1 TABLET ORAL 3 TIMES DAILY PRN
Qty: 90 TABLET | Refills: 0 | Status: SHIPPED | OUTPATIENT
Start: 2025-04-04

## 2025-04-08 ENCOUNTER — OFFICE VISIT (OUTPATIENT)
Dept: PODIATRY | Facility: CLINIC | Age: 80
End: 2025-04-08
Payer: COMMERCIAL

## 2025-04-08 VITALS — HEIGHT: 57 IN | WEIGHT: 231 LBS | BODY MASS INDEX: 49.84 KG/M2

## 2025-04-08 DIAGNOSIS — M20.41 HAMMERTOE, BILATERAL: Primary | ICD-10-CM

## 2025-04-08 DIAGNOSIS — S82.892P CLOSED FRACTURE OF LEFT ANKLE WITH MALUNION, SUBSEQUENT ENCOUNTER: ICD-10-CM

## 2025-04-08 DIAGNOSIS — M20.42 HAMMERTOE, BILATERAL: Primary | ICD-10-CM

## 2025-04-08 PROCEDURE — 99203 OFFICE O/P NEW LOW 30 MIN: CPT | Performed by: PODIATRIST

## 2025-04-08 NOTE — PROGRESS NOTES
"Name: Patricia Mariscal      : 1945      MRN: [id number]  Encounter Provider: Wilmar Durán DPM  Encounter Date: 2025   Encounter department: [facility] [place]  :  Assessment & Plan  Hammertoe, bilateral  Conservative care involves toe crests. I would not recommend reconstruction. Provided the crest today. Callus can be pared as needed       Closed fracture of left ankle with malunion, subsequent encounter  She saw orthopedic for this.I looked at her XR, the ankle is severe malaligned and arthritic. I'd recommend a brace but she doesn't like it ([place] brace)            History of Present Illness   HPI  Patricia Mariscal is a 80 y.o. female who presents with chronic foot pain. She has bunions but they don't hurt as long as she is in orthopedic shoes. She gets painful callus on her hammertoes which hurt a lot. The right is worse than left. She also has chronic left ankle pain.       Review of Systems  As stated in HPI, otherwise normal    Medical History Reviewed by provider this encounter:  Tobacco  Allergies  Meds  Problems  Med Hx  Surg Hx  Fam Hx           Objective   Ht 4' 8.5\" (1.435 m)   Wt 105 kg (231 lb)   BMI 50.88 kg/m²      Physical Exam  Vitals reviewed.   Constitutional:       Appearance: She is obese.   Cardiovascular:      Rate and Rhythm: Normal rate.      Pulses: Normal pulses.           Dorsalis pedis pulses are 2+ on the right side and 2+ on the left side.        Posterior tibial pulses are 2+ on the right side and 2+ on the left side.   Musculoskeletal:         General: Deformity present.      Right foot: Deformity (semirigid hammertoe 2,3 B/L) and bunion present.      Left foot: Decreased range of motion (left ankle ROM is severely limited, angulated and painful). Deformity and bunion present.   Feet:      Right foot:      Protective Sensation: 10 sites tested.  10 sites sensed.      Skin integrity: Callus present.      Toenail Condition: Right toenails are " abnormally thick. Fungal disease present.     Left foot:      Protective Sensation: 10 sites tested.  10 sites sensed.      Skin integrity: Callus present.      Toenail Condition: Left toenails are abnormally thick. Fungal disease present.  Skin:     Capillary Refill: Capillary refill takes less than 2 seconds.      Findings: Lesion present. No erythema.   Neurological:      Mental Status: She is alert and oriented to person, place, and time.

## 2025-04-09 ENCOUNTER — OFFICE VISIT (OUTPATIENT)
Dept: BEHAVIORAL/MENTAL HEALTH CLINIC | Facility: CLINIC | Age: 80
End: 2025-04-09
Payer: COMMERCIAL

## 2025-04-09 ENCOUNTER — TELEPHONE (OUTPATIENT)
Dept: PSYCHIATRY | Facility: CLINIC | Age: 80
End: 2025-04-09

## 2025-04-09 DIAGNOSIS — F32.1 CURRENT MODERATE EPISODE OF MAJOR DEPRESSIVE DISORDER WITHOUT PRIOR EPISODE (HCC): Primary | ICD-10-CM

## 2025-04-09 PROCEDURE — 90791 PSYCH DIAGNOSTIC EVALUATION: CPT | Performed by: COUNSELOR
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
## 2025-04-11 ENCOUNTER — TELEPHONE (OUTPATIENT)
Dept: ADMINISTRATIVE | Facility: OTHER | Age: 80
End: 2025-04-11

## 2025-04-11 NOTE — TELEPHONE ENCOUNTER
04/11/25 3:38 PM    Patient contacted to bring Advance Directive, POLST, or Living Will document to next scheduled pcp visit.[facility] Department was unable to leave a message; no answer/ line busy.    Thank you.  Coco Mcelroy MA  [facility]

## 2025-04-14 ENCOUNTER — OFFICE VISIT (OUTPATIENT)
Age: 80
End: 2025-04-14
Payer: COMMERCIAL

## 2025-04-14 VITALS
RESPIRATION RATE: 16 BRPM | OXYGEN SATURATION: 98 % | HEIGHT: 56 IN | TEMPERATURE: 97.5 F | DIASTOLIC BLOOD PRESSURE: 90 MMHG | SYSTOLIC BLOOD PRESSURE: 150 MMHG | HEART RATE: 62 BPM | BODY MASS INDEX: 51.96 KG/M2 | WEIGHT: 231 LBS

## 2025-04-14 DIAGNOSIS — I10 PRIMARY HYPERTENSION: ICD-10-CM

## 2025-04-14 DIAGNOSIS — D53.9 MACROCYTIC ANEMIA: Primary | ICD-10-CM

## 2025-04-14 DIAGNOSIS — Z98.890 HISTORY OF COLON SURGERY: ICD-10-CM

## 2025-04-14 DIAGNOSIS — N18.31 STAGE 3A CHRONIC KIDNEY DISEASE (HCC): ICD-10-CM

## 2025-04-14 DIAGNOSIS — D51.8 OTHER VITAMIN B12 DEFICIENCY ANEMIA: ICD-10-CM

## 2025-04-14 PROCEDURE — 99213 OFFICE O/P EST LOW 20 MIN: CPT | Performed by: INTERNAL MEDICINE

## 2025-04-14 RX ORDER — CYANOCOBALAMIN 1000 UG/ML
1000 INJECTION, SOLUTION INTRAMUSCULAR; SUBCUTANEOUS
Status: SHIPPED | OUTPATIENT
Start: 2025-05-14 | End: 2025-07-13

## 2025-04-14 RX ORDER — CYANOCOBALAMIN 1000 UG/ML
1000 INJECTION, SOLUTION INTRAMUSCULAR; SUBCUTANEOUS
Status: DISCONTINUED | OUTPATIENT
Start: 2025-04-14 | End: 2025-04-14

## 2025-04-14 NOTE — ASSESSMENT & PLAN NOTE
Lab Results   Component Value Date    EGFR 43 03/13/2025    EGFR 52 12/05/2024    EGFR 54 05/26/2024    CREATININE 1.19 03/13/2025    CREATININE 1.02 12/05/2024    CREATININE 0.99 05/26/2024

## 2025-04-14 NOTE — PROGRESS NOTES
"Name: Patricia Mariscal      : 1945      MRN: [id number]  Encounter Provider: Sewtha Mcgowan MD  Encounter Date: 2025   [facility]  :  Assessment & Plan  Macrocytic anemia         Other vitamin B12 deficiency anemia      Orders:    cyanocobalamin injection 1,000 mcg    History of colon surgery         Stage 3a chronic kidney disease (HCC)  Lab Results   Component Value Date    EGFR 43 2025    EGFR 52 2024    EGFR 54 2024    CREATININE 1.19 2025    CREATININE 1.02 2024    CREATININE 0.99 2024                   History of Present Illness   HPI  Patient is here to follow-up on recent lab studies as well as discuss her recent visit with her visiting nurse for possibility of lower extremity arterial insufficiency.  Patient since then has seen a podiatrist and was told she has good circulation.  We directed attention to her high blood pressure.  She reports her blood pressures been doing quite well at home.  She does take lisinopril and atenolol.  At this point we would not change her medication.  I recent labs were discussed.  Mild anemia is noted with macrocytosis.  B12 checked in the last year showed low levels.  Patient had colon resection when she had colon cancer.  Restarted B12 injection for 3 months and then she will start p.o. supplementation.  Follow-up lab studies in future.   Review of Systems    Objective   /90 (BP Location: Left arm, Patient Position: Sitting, Cuff Size: Large)   Pulse 62   Temp 97.5 °F (36.4 °C) (Temporal)   Resp 16   Ht 4' 8\" (1.422 m)   Wt 105 kg (231 lb)   SpO2 98%   BMI 51.79 kg/m²      Physical Exam    "

## 2025-04-16 RX ORDER — ATENOLOL 50 MG/1
TABLET ORAL
Qty: 45 TABLET | Refills: 5 | Status: SHIPPED | OUTPATIENT
Start: 2025-04-16

## 2025-04-16 RX ORDER — LISINOPRIL 10 MG/1
10 TABLET ORAL DAILY
Qty: 30 TABLET | Refills: 5 | Status: SHIPPED | OUTPATIENT
Start: 2025-04-16

## 2025-04-22 ENCOUNTER — TELEPHONE (OUTPATIENT)
Age: 80
End: 2025-04-22

## 2025-04-22 NOTE — TELEPHONE ENCOUNTER
Patient is calling regarding cancelling an appointment.    Date/Time: 5/20/25 at 4 pm    Reason: sooner appt requested    Patient was rescheduled: YES [x] NO []  If yes, when was Patient reschedule for: 5/12/25 at 3 pm    Patient requesting call back to reschedule: YES [] NO [x]

## 2025-04-22 NOTE — TELEPHONE ENCOUNTER
Patients GI provider:  Marybeth    Number to return call: [phone]    Reason for call: Colon consult / Pt is 80 yrs    Scheduled procedure/appointment date if applicable: Appt 6/20/25

## 2025-04-22 NOTE — TELEPHONE ENCOUNTER
Patient is calling regarding cancelling an appointment.    Date/Time: 4/22/25 at 4pm    Reason: patient fell yesterday and cannot make appt today. Patient is very sore today.     Patient was rescheduled: YES [] NO [x]  If yes, when was Patient reschedule for: n/a    Patient requesting call back to reschedule: YES [] NO [x] Patient will call back when she knows the bus schedule to schedule a f/u appt.

## 2025-05-12 ENCOUNTER — SOCIAL WORK (OUTPATIENT)
Dept: BEHAVIORAL/MENTAL HEALTH CLINIC | Facility: CLINIC | Age: 80
End: 2025-05-12
Payer: COMMERCIAL

## 2025-05-12 DIAGNOSIS — F32.1 CURRENT MODERATE EPISODE OF MAJOR DEPRESSIVE DISORDER WITHOUT PRIOR EPISODE (HCC): Primary | ICD-10-CM

## 2025-05-12 PROCEDURE — 90837 PSYTX W PT 60 MINUTES: CPT | Performed by: COUNSELOR

## 2025-05-12 NOTE — PSYCH
"Behavioral Health Psychotherapy Progress Note    Psychotherapy Provided: Individual Psychotherapy     1. Current moderate episode of major depressive disorder without prior episode (HCC)            Goals addressed in session: Goal 1     DATA: Clinician and client completed treatment and safety plan, discussed goals and objectives. Client provided more history on her family, including stressors like living with her son and granddaughter above her as client believes they take things from her without asking. Discussed stress management and communication skills, clinician agreed to follow up on this as the creation of the plans took some time due to client's ruminations.   During this session, this clinician used the following therapeutic modalities: Cognitive Behavioral Therapy    Substance Abuse was not addressed during this session. If the client is diagnosed with a co-occurring substance use disorder, please indicate any changes in the frequency or amount of use: N/A. Stage of change for addressing substance use diagnoses: No substance use/Not applicable    ASSESSMENT:  Patricia Mariscal presents with a Euthymic/ normal mood.     her affect is Normal range and intensity, which is congruent, with her mood and the content of the session. The client has made progress on their goals.    Client ruminates on topics which makes it difficult to complete required documents, she does respond to verbal redirection, however she will resume rumination shortly after. Patricia Mariscal presents with a none risk of suicide, none risk of self-harm, and none risk of harm to others.    For any risk assessment that surpasses a \"low\" rating, a safety plan must be developed.    A safety plan was indicated: no  If yes, describe in detail N/A    PLAN: Between sessions, Patricia Mariscal will utilize learned skills. At the next session, the therapist will use Cognitive Behavioral Therapy to address goal.    Behavioral Health Treatment Plan and " Discharge Planning: Patricia Mariscal is aware of and agrees to continue to work on their treatment plan. They have identified and are working toward their discharge goals. yes    Depression Follow-up Plan Completed: Not applicable    Visit start and stop times:    05/12/25  Start Time: 1500  Stop Time: 1558  Total Visit Time: 58 minutes

## 2025-05-12 NOTE — BH CRISIS PLAN
Client Name: Patricia Mariscal       Client YOB: 1945    TyroneGuero Safety Plan      Creation Date: 5/12/25 Update Date: 5/12/25   Created By: Vasiliy David LPC Last Updated By: Vasiliy David LPC      Step 1: Warning Signs:   Warning Signs   Urge to cry but unable to.   Chin begins to shake.   Decrease in motivation.            Step 2: Internal Coping Strategies:   Internal Coping Strategies   Positive self-talk.   [place].            Step 3: People and social settings that provide distraction:   Name Contact Information   Nitish (Friend) In phone.            Step 4: People whom I can ask for help during a crisis:      Name Contact Information    Berny Narayanan (Son) In phone      Step 5: Professionals or agencies I can contact during a crisis:      Clinican/Agency Name Phone Emergency Contact    Vasiliy David/ [facility] [phone]       [facility] Emergency Department Emergency Department Phone Emergency Department Address    [facility] [phone] [address] [place] [id number]        Crisis Phone Numbers:   Suicide Prevention Lifeline: Call or Text  686 Crisis Text Line: Text HOME to 380-666   Please note: Some [facility] do not have a separate number for Child/Adolescent specific crisis. If your county is not listed under Child/Adolescent, please call the adult number for your county      Adult Crisis Numbers: Child/Adolescent Crisis Numbers   [facility]: [phone] [facility]: [phone]   [facility]: [phone] [facility]: [phone]   [facility]: [phone] [place]: [phone]   [facility]: [phone] [place]: [phone]   [facility]: [phone]   [facility]: [phone]   [facility]: [phone]   [facility]: [phone] (daytime) [phone] (after hours, weekends, holidays)      Step 6: Making the environment safer (plan for  "lethal means safety):   Patient did not identify any lethal methods: Yes     Optional: What is most important to me and worth living for?   \"I don't know yet\"     [place] Safety Plan. Berta Hansen and Elvin Jack. Used with permission of the authors.           "

## 2025-05-12 NOTE — BH TREATMENT PLAN
"Outpatient Behavioral Health Psychotherapy Treatment Plan    Patricia Mariscal  1945     Date of Initial Psychotherapy Assessment: 04/09/2025   Date of Current Treatment Plan: 05/12/25  Treatment Plan Target Date: 11/12/2025  Treatment Plan Expiration Date: 11/12/2025    Diagnosis:   1. Current moderate episode of major depressive disorder without prior episode (HCC)            Area(s) of Need: Symptom management, depression management, coping skills, self-care.     Long Term Goal 1 (in the client's own words): \"To handle things better\"    Stage of Change: Action    Target Date for completion: 11/12/2025     Anticipated therapeutic modalities: CBT, Client Centered Therapy, Solution Focused Therapy, Mindfulness Based Therapy.      People identified to complete this goal: Clinician and client.       Objective 1: (identify the means of measuring success in meeting the objective): Client will attend all counseling sessions as scheduled.       Objective 2: (identify the means of measuring success in meeting the objective): Over the next 6 months client will review how she managed stressors and explore this with therapist.      Objective 3: (identify the means of measuring success in meeting the objective): Client will be referred and obtain medication management.      Objective 4: (identify the means of measuring success in meeting the objective): Over the next 6 months client will practice communication skills such as ELBERT to express herself appropriately.      Objective 5: (identify the means of measuring success in meeting the objective): Client will learn and utilize coping skills over the next 6 months to manage episodes of anxiety and/ or depression.       I am currently under the care of a [facility] psychiatric provider: yes (Temporary)     My [facility] psychiatric provider is: Swetha Mcgowan MD     I am currently taking psychiatric medications: Yes, as prescribed    I feel that I will be ready for discharge " from mental health care when I reach the following (measurable goal/objective): when I reach my goal    For children and adults who have a legal guardian:   Has there been any change to custody orders and/or guardianship status? NA. If yes, attach updated documentation.    I have created my Crisis Plan and have been offered a copy of this plan    Behavioral Health Treatment Plan [facility]: Diagnosis and Treatment Plan explained to Patricia Mariscal acknowledges an understanding of their diagnosis. Patricia Mariscal agrees to this treatment plan.    I have been offered a copy of this Treatment Plan. yes

## 2025-05-20 ENCOUNTER — TELEPHONE (OUTPATIENT)
Age: 80
End: 2025-05-20

## 2025-05-20 NOTE — TELEPHONE ENCOUNTER
Pt returning call, states she was just speaking with a staff member who attempted to transfer her to a nurse, but the call disconnected. Pt would like to speak with her therapist regarding a reoccurring dream she has been having for about a year. Pt shares, last year when she was in a nursing rehab, physicians at this facility adjusted Ativan, even though she had been stable on the same dose for a long time. This is around the time the dream began. Pt would like to discuss the details of the dream. Pt describes the dream; she runs around aimlessly, never arriving at a destination. The dream is not always the same as far as the environment. Sometimes the environment is familiar, such as, buildings and/or streets. But other times, nothing is familiar. When pt awakens, her teeth are chattering, her heart is pounding, and she is crying.   Please contact pt at soonest availability.

## 2025-05-20 NOTE — TELEPHONE ENCOUNTER
The patient is requesting a call back regarding terror like dreams she is experiencing from her medication.    The patient advised she is not taking the   lorazapam consistently, ( skipping the middle dose).    She is not sure if this is being caused by the venlafaxine along with the lorazapam.    Symptom:  Wakes up with her chin shaking and very upset    Please contact Patricia today, to discuss the above.    Patricia:  [phone]    Thank you.     hair removal not indicated

## 2025-05-20 NOTE — TELEPHONE ENCOUNTER
Spoke to Patricia - she said she got a call from Vasiliy and missed it because she was in the kitchen feeding her cats. She would like him to call her back and said she will keep her phone on her.

## 2025-05-20 NOTE — TELEPHONE ENCOUNTER
Patient returning missed call possibly from providerLEOPOLDO. Writer jonathan transferred patient for further assistance.

## 2025-05-20 NOTE — TELEPHONE ENCOUNTER
Pt contacted [facility] in regard to the message she left earlier this morning. Pt explains, she had fallen asleep and wondered if an outreach was made while she was sleeping. Writer advised pt her message is still under review. Pt will receive a call once the message has been fully reviewed.       Note: Upon further review, [facility] does not manage meds. Meds Rx'd by PCP. If pt calls back, inform pt messages have been sent to PCP. However, she will need to contact their office for further assistance with med related questions/concerns.

## 2025-05-21 ENCOUNTER — OFFICE VISIT (OUTPATIENT)
Dept: BEHAVIORAL/MENTAL HEALTH CLINIC | Facility: CLINIC | Age: 80
End: 2025-05-21
Payer: COMMERCIAL

## 2025-05-21 DIAGNOSIS — F32.1 CURRENT MODERATE EPISODE OF MAJOR DEPRESSIVE DISORDER WITHOUT PRIOR EPISODE (HCC): Primary | ICD-10-CM

## 2025-05-21 PROCEDURE — 90837 PSYTX W PT 60 MINUTES: CPT | Performed by: COUNSELOR

## 2025-05-21 NOTE — PSYCH
"Behavioral Health Psychotherapy Progress Note    Psychotherapy Provided: Individual Psychotherapy     1. Current moderate episode of major depressive disorder without prior episode (HCC)            Goals addressed in session: Goal 1     DATA: Clinician met with client and discussed her \"reoccurring nightmare\", she explained she is running through a city with narrow streets and no side-walk, through further exploration client did realize the location is a street where her second  and father of her only child worked. Discussed their relationship and she explained how she admired her ex-. Discussed her feelings in regards to his death and her mourning. Explored future plans for sessions, including but not limited to exploring her grief and feelings in regard to her ex-.  During this session, this clinician used the following therapeutic modalities: Cognitive Behavioral Therapy    Substance Abuse was not addressed during this session. If the client is diagnosed with a co-occurring substance use disorder, please indicate any changes in the frequency or amount of use: N/A. Stage of change for addressing substance use diagnoses: No substance use/Not applicable    ASSESSMENT:  Patricia Mariscal presents with a Euthymic/ normal mood.     her affect is Normal range and intensity, which is congruent, with her mood and the content of the session. The client has made progress on their goals.    Client appears lonely, she lives in the same building as her son and granddaughter, however in previous sessions she did explain she feels alone there and they don't \"listen to her\". This session was requested early by client due to her nightmare, showing a discomfort and lack of trust in sharing with her family. Reviewing natural supports in future sessions would be appropriate.  Patricia Mariscal presents with a none risk of suicide, none risk of self-harm, and none risk of harm to others.    For any risk assessment " "that surpasses a \"low\" rating, a safety plan must be developed.    A safety plan was indicated: no  If yes, describe in detail N/A    PLAN: Between sessions, Patricia Mariscal will keep a dream journal and share with clinician at next session. At the next session, the therapist will use Cognitive Behavioral Therapy to address homework and grief.    Behavioral Health Treatment Plan and Discharge Planning: Patricia Mariscal is aware of and agrees to continue to work on their treatment plan. They have identified and are working toward their discharge goals. yes    Depression Follow-up Plan Completed: Not applicable    Visit start and stop times:    05/21/25  Start Time: 1701  Stop Time: 1756  Total Visit Time: 55 minutes  "

## 2025-05-22 ENCOUNTER — TELEPHONE (OUTPATIENT)
Dept: INFECTIOUS DISEASES | Facility: CLINIC | Age: 80
End: 2025-05-22

## 2025-05-22 DIAGNOSIS — F32.A DEPRESSION, UNSPECIFIED DEPRESSION TYPE: ICD-10-CM

## 2025-05-22 RX ORDER — LORAZEPAM 1 MG/1
1 TABLET ORAL 3 TIMES DAILY PRN
Qty: 90 TABLET | Refills: 0 | Status: SHIPPED | OUTPATIENT
Start: 2025-05-22

## 2025-05-22 NOTE — TELEPHONE ENCOUNTER
Reached out to Patricia to remind her to please obtain non fasting labs prior to appointment with us next week.     No answer. No voicemail set up.

## 2025-05-23 ENCOUNTER — TELEPHONE (OUTPATIENT)
Dept: INFECTIOUS DISEASES | Facility: CLINIC | Age: 80
End: 2025-05-23

## 2025-05-23 NOTE — TELEPHONE ENCOUNTER
I called Patricia to remind her to get her labs done before her f/u appt next week. She said she is unable to get them unless someone comes to her house, and as of now there is no visits scheduled.   I let her know to still come in for her visit.

## 2025-05-26 ENCOUNTER — APPOINTMENT (EMERGENCY)
Dept: RADIOLOGY | Facility: HOSPITAL | Age: 80
End: 2025-05-26
Payer: COMMERCIAL

## 2025-05-26 ENCOUNTER — HOSPITAL ENCOUNTER (EMERGENCY)
Facility: HOSPITAL | Age: 80
Discharge: HOME | End: 2025-05-27
Attending: EMERGENCY MEDICINE | Admitting: EMERGENCY MEDICINE
Payer: COMMERCIAL

## 2025-05-26 VITALS
SYSTOLIC BLOOD PRESSURE: 140 MMHG | DIASTOLIC BLOOD PRESSURE: 64 MMHG | RESPIRATION RATE: 18 BRPM | HEART RATE: 60 BPM | TEMPERATURE: 98.1 F | OXYGEN SATURATION: 96 %

## 2025-05-26 DIAGNOSIS — R03.0 ELEVATED BLOOD PRESSURE READING: Primary | ICD-10-CM

## 2025-05-26 PROCEDURE — 72125 CT NECK SPINE W/O DYE: CPT

## 2025-05-26 PROCEDURE — 99284 EMERGENCY DEPT VISIT MOD MDM: CPT | Performed by: EMERGENCY MEDICINE

## 2025-05-26 PROCEDURE — 70450 CT HEAD/BRAIN W/O DYE: CPT

## 2025-05-26 PROCEDURE — 99284 EMERGENCY DEPT VISIT MOD MDM: CPT

## 2025-05-26 RX ORDER — ACETAMINOPHEN 325 MG/1
650 TABLET ORAL ONCE
Status: COMPLETED | OUTPATIENT
Start: 2025-05-26 | End: 2025-05-26

## 2025-05-26 RX ADMIN — ACETAMINOPHEN 650 MG: 325 TABLET ORAL at 20:57

## 2025-05-26 NOTE — DISCHARGE INSTRUCTIONS
Your blood pressure was elevated in the emergency department today.  You should make an appointment with your doctor in the next 1 week for follow-up and recheck of the blood pressure.    You were evaluated in the emergency department for a fall.  Your evaluation is showing no signs of a fracture or other complication following your fall.

## 2025-05-26 NOTE — ED ATTENDING ATTESTATION
5/26/2025  I, Paddy Albright DO, saw and evaluated the patient. I have discussed the patient with the resident/non-physician practitioner and agree with the resident's/non-physician practitioner's findings, Plan of Care, and MDM as documented in the resident's/non-physician practitioner's note, except where noted. All available labs and Radiology studies were reviewed.  I was present for key portions of any procedure(s) performed by the resident/non-physician practitioner and I was immediately available to provide assistance.       At this point I agree with the current assessment done in the Emergency Department.  I have conducted an independent evaluation of this patient a history and physical is as follows:    Patient is a 80-year-old female with a history of anxiety, hypertension, arthritis, normally ambulates with a walker.  She is accompanied by her son.  Earlier today she was with her walker, when her dog hit her leg, causing her to fall backwards, hitting her head.  She did not pass out, does not take any antiplatelet or anticoagulation.  She has some mild headache and noticed some swelling the back of her head.  Her son says she is otherwise been acting normally.  He has not seen any slurred speech or confusion.  She has no pain in the arms or legs otherwise.  Says she does not feel like she got injured except for the back of the head.    General:  Patient is well-appearing  Head: Patient has a 3 cm hematoma to her posterior occiput, no other signs of trauma  Eyes:  Conjunctiva pink, Extraocular muscle intact, no periorbital ecchymosis, PERRL  ENT:  Mucous membranes are moist, no dental malocclusion, no craniofacial instability, no Teixeira signs  Neck:  No midline tenderness or step-offs or deformities  Cardiac:  S1-S2, without murmurs  Lungs:  Clear to auscultation bilaterally, no clavicular tenderness on either side, no chest wall tenderness, no crepitus, no flail segment, no paradoxical  motion  Abdomen:  Soft, nontender, normal bowel sounds, no CVA tenderness, no tympany, no rigidity, no guarding  Extremities:  No bony tenderness to the bilateral bilateral humeral heads, humerus, elbows, radius, ulna, hands, hips, femurs, knees, tibia, fibula, feet. No pain with passive range of motion at the bilateral shoulders, elbows, wrists, hips, knees, or ankles.  Back: No midline thoracic, lumbar, sacral tenderness, deformities, or step-offs.  Neurologic:  Awake, fluent speech, normal comprehension, AAOx3, No deficit on finger to nose testing, no pronator drift, cranial nerves II through XII are intact, no facial droop, no slurred speech, normal sensation, strength 5/5 in b/l upper & lower extremities.  Skin:  Pink warm and dry  Psychiatric:  Alert, pleasant, cooperative      ED Course     CT head without contrast   Final Result      No acute intracranial abnormality.                  Workstation performed: [id number]         CT spine cervical without contrast   Final Result      No cervical spine fracture or traumatic malalignment.                  Workstation performed: [id number]           On reassessment after returning from CT patient was feeling comfortable, no change in the above findings, able to ambulate and said she felt at her baseline.  Patient has had a mild fall, closed head injury, has a scalp hematoma but no sign of skull fracture, no sign of life-threatening intracranial hemorrhage, no sign of cervical spine fracture.Supportive care, importance of follow-up and return precautions were discussed with patient and son, who expressed understanding.        IMPRESSIONS:  Acute fall, acute closed head injury, acute scalp hematoma    MEDICAL DECISION MAKING CODING    COLLECTION AND INTERPRETATION OF DATA  I reviewed prior external notes, including April 14, 2025 family medicine office visit    I ordered each unique test  Tests reviewed personally by me:  Imaging: I independently interpreted the head  CT as noted above head and cervical spine CT as noted above.            Critical Care Time  Procedures

## 2025-05-27 ENCOUNTER — TELEPHONE (OUTPATIENT)
Age: 80
End: 2025-05-27

## 2025-05-27 NOTE — TELEPHONE ENCOUNTER
Pt called in to confirm appt on 5/29. Pt was in hospital yesterday and is unsure how she will feel on Thursday. Informed pt that is she needs to change appt to call back. Pt gave verbal understanding.

## 2025-05-27 NOTE — ED PROVIDER NOTES
Time reflects when diagnosis was documented in both MDM as applicable and the Disposition within this note       Time User Action Codes Description Comment    5/26/2025  7:20 PM Paddy Albright Add [R03.0] Elevated blood pressure reading           ED Disposition       ED Disposition   Discharge    Condition   Stable    Date/Time   Mon May 26, 2025 11:26 PM    Comment   Patricia Mariscal discharge to home/self care.                   Assessment & Plan       Medical Decision Making  Patient presenting after a fall, with hematoma, headache.  Will evaluate with CT head to rule out subdural, skull fracture, and CT C-spine to evaluate for C-spine fracture.  No other injuries, neuro intact, will ambulatory challenge and discharge if results unremarkable    Amount and/or Complexity of Data Reviewed  Radiology: ordered.    Risk  OTC drugs.             Medications   acetaminophen (TYLENOL) tablet 650 mg (650 mg Oral Given 5/26/25 2057)       ED Risk Strat Scores                    No data recorded        SBIRT 22yo+      Flowsheet Row Most Recent Value   Initial Alcohol Screen: US AUDIT-C     1. How often do you have a drink containing alcohol? 0 Filed at: 05/26/2025 1856   Audit-C Score 0 Filed at: 05/26/2025 1856   MINDY: How many times in the past year have you...    Used an illegal drug or used a prescription medication for non-medical reasons? Never Filed at: 05/26/2025 1856                            History of Present Illness       Chief Complaint   Patient presents with    Fall     Fall from standing position, fell backwards +HS -LOC -ASA/thinners. C/o HA       Past Medical History[1]   Past Surgical History[2]   Family History[3]   Social History[4]   E-Cigarette/Vaping    E-Cigarette Use Never User       E-Cigarette/Vaping Substances      I have reviewed and agree with the history as documented.     Patient is a 80-year-old female usually ambulating with a walker presenting after a fall in which she tripped over her dog and  fell hitting the back of her head.  No loss of consciousness, no vomiting, no antiplatelet or anticoagulation medications.  She has had continuous headache since the fall and swelling on her posterior occiput.  She is coming in for evaluation of this.  No other injuries or complaints further review of systems negative    Review of Systems   All other systems reviewed and are negative.          Objective       ED Triage Vitals   Temperature Pulse Blood Pressure Respirations SpO2 Patient Position - Orthostatic VS   05/26/25 1858 05/26/25 1858 05/26/25 1858 05/26/25 1858 05/26/25 1858 05/26/25 1858   98.1 °F (36.7 °C) 70 (!) 184/78 18 98 % Sitting      Temp Source Heart Rate Source BP Location FiO2 (%) Pain Score    05/26/25 1858 05/26/25 1858 05/26/25 1858 -- 05/26/25 1900    Oral Monitor Left arm  8      Vitals      Date and Time Temp Pulse SpO2 Resp BP Pain Score FACES Pain Rating User   05/26/25 2330 -- 60 96 % 18 140/64 -- -- ED   05/26/25 2300 -- 58 97 % 19 145/67 -- -- ED   05/26/25 2230 -- 60 100 % 18 158/69 -- -- ED   05/26/25 2204 -- -- -- -- -- 6 -- ED   05/26/25 2130 -- 60 98 % 18 146/64 -- -- ED   05/26/25 2030 -- 62 99 % 18 154/66 -- -- ED   05/26/25 2000 -- 62 99 % -- 157/68 -- -- ED   05/26/25 1930 -- 62 98 % -- 152/69 -- -- ED   05/26/25 1900 -- -- -- -- -- 8 -- ED   05/26/25 1858 98.1 °F (36.7 °C) 70 98 % 18 184/78 -- -- ED            Physical Exam  Vitals and nursing note reviewed.   Constitutional:       General: She is not in acute distress.     Appearance: She is well-developed.   HENT:      Head: Normocephalic and atraumatic.     Eyes:      Conjunctiva/sclera: Conjunctivae normal.       Cardiovascular:      Rate and Rhythm: Normal rate and regular rhythm.      Heart sounds: No murmur heard.  Pulmonary:      Effort: Pulmonary effort is normal. No respiratory distress.      Breath sounds: Normal breath sounds.   Abdominal:      Palpations: Abdomen is soft.      Tenderness: There is no abdominal  tenderness.     Musculoskeletal:         General: Swelling present.      Cervical back: Neck supple.      Comments: Posterior scalp hematoma     Skin:     General: Skin is warm and dry.      Capillary Refill: Capillary refill takes less than 2 seconds.     Neurological:      General: No focal deficit present.      Mental Status: She is alert.      Cranial Nerves: No cranial nerve deficit.     Psychiatric:         Mood and Affect: Mood normal.         Results Reviewed       None            CT head without contrast   Final Interpretation by Levy Chavez MD (05/26 2320)      No acute intracranial abnormality.                  Workstation performed: [id number]         CT spine cervical without contrast   Final Interpretation by Levy Chavez MD (05/26 2324)      No cervical spine fracture or traumatic malalignment.                  Workstation performed: [id number]             Procedures    ED Medication and Procedure Management   Prior to Admission Medications   Prescriptions Last Dose Informant Patient Reported? Taking?   Cholecalciferol 125 MCG (5000 UT) capsule   Yes No   Sig: Take 10,000 Units by mouth every 30 (thirty) days   LORazepam (ATIVAN) 1 mg tablet   No No   Sig: TAKE 1 TABLET (1 MG TOTAL) BY MOUTH 3 (THREE) TIMES A DAY AS NEEDED FOR ANXIETY   atenolol (TENORMIN) 50 mg tablet   No No   Sig: TAKE ONE AND HALF TABLET BY MOUTH ONCE A DAY   diphenhydrAMINE (Benadryl Allergy) 25 mg tablet   Yes No   Sig: Take by mouth if needed   fluticasone (Flonase Allergy Relief) 50 mcg/act nasal spray   Yes No   Sig: into each nostril   hydroCHLOROthiazide 12.5 mg tablet   No No   Sig: Take 1 tablet (12.5 mg total) by mouth daily   latanoprost (XALATAN) 0.005 % ophthalmic solution   Yes No   Sig: Apply 1 drop to eye   lisinopril (ZESTRIL) 10 mg tablet   No No   Sig: TAKE ONE TABLET BY MOUTH DAILY   methocarbamol (ROBAXIN) 500 mg tablet   No No   Sig: Take 1 tablet (500 mg total) by mouth 2 (two) times a day as  needed for muscle spasms   naloxone (NARCAN) 4 mg/0.1 mL nasal spray   No No   Sig: Administer 1 spray into a nostril. If no response after 2-3 minutes, give another dose in the other nostril using a new spray.   ondansetron (ZOFRAN) 4 mg tablet   No No   Sig: Take 1 tablet (4 mg total) by mouth every 6 (six) hours as needed for nausea or vomiting   sulfamethoxazole-trimethoprim (BACTRIM DS) 800-160 mg per tablet   No No   Sig: Take 1 tablet by mouth every 12 (twelve) hours   traMADol (ULTRAM) 50 mg tablet   No No   Sig: Take 1 tablet (50 mg total) by mouth daily as needed for severe pain   venlafaxine (EFFEXOR) 75 mg tablet   No No   Sig: TAKE 1 TABLET (75 MG TOTAL) BY MOUTH IN THE MORNING   venlafaxine (EFFEXOR-XR) 150 mg 24 hr capsule   No No   Sig: TAKE 1 CAPSULE (150 MG TOTAL) BY MOUTH DAILY      Facility-Administered Medications Last Administration Doses Remaining   cyanocobalamin injection 1,000 mcg None recorded 2        Patient's Medications   Discharge Prescriptions    No medications on file     No discharge procedures on file.  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    Depression     History of transfusion     Hypertension     Inflammation of sacroiliac joint (HCC) 2023    MRSA (methicillin resistant Staphylococcus aureus)     Partial small bowel obstruction (HCC) 2024    Pneumonia    [2]   Past Surgical History:  Procedure Laterality Date    APPENDECTOMY       SECTION      CHOLECYSTECTOMY      COLON SURGERY      DILATION AND CURETTAGE OF UTERUS      EXTRACTION, ERUPTED TOOTH  REQUIRING REMOVAL OF BONE AND/OR SECTIONING OF TOOTH, AND INCLUDING ELEVATION OF MUCOPERIOSTEAL FLAP IF INDICATED N/A 4/22/2024    Procedure: EXTRACTION TEETH MULTIPLE -2,3,7,10, 14,19,29,30;  Surgeon: Livan Trotter DMD;  Location: [facility];  Service: Maxillofacial    EYE SURGERY      GASTRIC BYPASS      HERNIA REPAIR      several    HYSTERECTOMY      JOINT REPLACEMENT     [3] No family history on file.  [4]   Social History  Tobacco Use    Smoking status: Never    Smokeless tobacco: Never   Vaping Use    Vaping status: Never Used   Substance Use Topics    Alcohol use: Never    Drug use: Never        Luis Daniel Monroy MD  05/26/25 6811

## 2025-05-28 ENCOUNTER — TELEPHONE (OUTPATIENT)
Age: 80
End: 2025-05-28

## 2025-05-28 ENCOUNTER — VBI (OUTPATIENT)
Age: 80
End: 2025-05-28

## 2025-05-28 NOTE — TELEPHONE ENCOUNTER
Patient is calling regarding cancelling an appointment.    Date/Time: 5/29/2025 at 3 pm    Reason: pt fell and hurt herself    Patient was rescheduled: YES [] NO [x]  If yes, when was Patient reschedule for: n/a    Patient requesting call back to reschedule: YES [] NO [x]        Pt stated she is sorry for having to cancel her appt but she can't get on the [employer] bus with as sore as she is.

## 2025-05-28 NOTE — TELEPHONE ENCOUNTER
Caller: Patient    Doctor: Dr. Mehta    Reason for call: patient called to report she had a fall on Monday 5/26. She fell backward and hit her head first, developing a golf ball size knot.  Patient was seen in ER and they did scans and no bleeds, concussion or fx. Patient states she took tramadol and muscle relaxers yesterday which dulled the pain but she woke up in tremendous pain this morning.  She states she has pain in her neck, down into her arms and back and torso.  Patient would like to discuss her options because it is getting worse not better.     Please assist.    Call back#: [phone]

## 2025-05-28 NOTE — TELEPHONE ENCOUNTER
Patient called and reported that she thought that she missed a call from provider. Writer attempted to see if he was available, but was not successful. She wanted to confirm her appts, since she cancelled appt for 5/29.

## 2025-05-28 NOTE — TELEPHONE ENCOUNTER
05/28/25 10:39 AM    Patient contacted post ED visit, [facility] department spoke with patient/caregiver and outreach was successful.    Thank you.  Flavia Clark MA  [facility]

## 2025-05-29 NOTE — TELEPHONE ENCOUNTER
Caller: Patricia     Doctor: Dr. Mehta     Reason for call: Patient calling asking to speak to nurse in regards to pain and fall please advise     Call back#: [phone]

## 2025-05-29 NOTE — TELEPHONE ENCOUNTER
Agree with recommendations.  I cannot make any recommendations until I see her considering recent fall.  I agree with having her evaluated by PCP regarding multiple falls for future prevention

## 2025-05-29 NOTE — TELEPHONE ENCOUNTER
S/w the patient after reviewing the previous task. She stated she still has whole body pain from the recent fall. She stated the Tramadol is not really cutting it anymore. She is also taking extra strength tylenol and that is not helping also. Reviewed that she has multiple previous falls and this one she fell back and hit her head. She was evaluated at the ED for it. Reviewed and inquired about taking the Robaxon and she stated no she has not been taking that. Reviewed that she is already taking an opiate and she runs the risk of having another fall because of the sedative qualities of several medications and unfortunately she has whole body pain from the fall. Encouraged ice, vs heat to help with the pain. Encouraged her to be evaluated by her PCP for the multiple falls and why she continues to have them. She does have a son that lives above her and a caregiver that comes in 4 times a week to help her. She stated both of her hips are bothering her. Not sure if they did xrays in the ED. Reviewed that since she cannot be seen by us soon then rather defer to PCP for an evaluation. JW to advise otherwise.

## 2025-06-02 ENCOUNTER — TELEPHONE (OUTPATIENT)
Age: 80
End: 2025-06-02

## 2025-06-02 NOTE — TELEPHONE ENCOUNTER
Pt called to confirm her appts and make sure her calendar was correct. Pt confirmed appts for 6/12 and 6/19

## 2025-06-12 ENCOUNTER — SOCIAL WORK (OUTPATIENT)
Dept: BEHAVIORAL/MENTAL HEALTH CLINIC | Facility: CLINIC | Age: 80
End: 2025-06-12
Payer: COMMERCIAL

## 2025-06-12 DIAGNOSIS — F32.1 CURRENT MODERATE EPISODE OF MAJOR DEPRESSIVE DISORDER WITHOUT PRIOR EPISODE (HCC): Primary | ICD-10-CM

## 2025-06-12 PROCEDURE — 90834 PSYTX W PT 45 MINUTES: CPT | Performed by: COUNSELOR

## 2025-06-12 NOTE — PSYCH
"Behavioral Health Psychotherapy Progress Note    Psychotherapy Provided: Individual Psychotherapy     1. Current moderate episode of major depressive disorder without prior episode (HCC)            Goals addressed in session: Goal 1     DATA: Clinician and client continue to explore her dreams, as she explained it is very important to her. Discussed her relationship with her son's father, whom client  from before his passing but remained friends. Discussed their marriage, the struggles and the positives. Explored meaning and inquired to why client feels the dreams are important, discussed and clinician recommended client keep a dream journal, client agreed.   During this session, this clinician used the following therapeutic modalities: Cognitive Behavioral Therapy    Substance Abuse was not addressed during this session. If the client is diagnosed with a co-occurring substance use disorder, please indicate any changes in the frequency or amount of use: N/A. Stage of change for addressing substance use diagnoses: No substance use/Not applicable  N/A  ASSESSMENT:  Patricia Mariscal presents with a Euthymic/ normal mood.     her affect is Normal range and intensity, which is congruent, with her mood and the content of the session. The client has made progress on their goals.    Client continues to present in active stage of change and benefits from this. Patricia Mariscal presents with a none risk of suicide, none risk of self-harm, and none risk of harm to others.    For any risk assessment that surpasses a \"low\" rating, a safety plan must be developed.    A safety plan was indicated: no  If yes, describe in detail N/A    PLAN: Between sessions, Patricia Mariscal will journal. At the next session, the therapist will use Cognitive Behavioral Therapy to address dreams.    Behavioral Health Treatment Plan and Discharge Planning: Patricia Mariscal is aware of and agrees to continue to work on their treatment plan. They " have identified and are working toward their discharge goals. yes    Depression Follow-up Plan Completed: Not applicable    Visit start and stop times:    06/12/25  Start Time: 1500  Stop Time: 1552  Total Visit Time: 52 minutes

## 2025-06-17 NOTE — PROGRESS NOTES
Name: Patricia Mariscal      : 1945      MRN: [id number]  Encounter Provider: JUNE Bryson  Encounter Date: 2025   Encounter department: [facility]  :  Assessment & Plan  Chronic pain syndrome    Orders:    traMADol (ULTRAM) 50 mg tablet; Take 1 tablet (50 mg total) by mouth daily as needed for severe pain    Cervical radiculopathy    Orders:    FL spine and pain procedure; Future    Trochanteric bursitis of both hips    Orders:    FL spine and pain procedure; Future    Patient will be scheduled for both C6-7 cervical epidural steroid injection and bilateral GTB injections under fluoroscopy.  Complete risks and benefits including bleeding, infection, tissue reaction, nerve injury and allergic reaction were discussed. The patient was agreeable and verbalized an understanding  Patient may continue tramadol as prescribed.  This medication was refilled today with 2 refills. Narcan up to date.     There are risks associated with opioid medications, including dependence, addiction and tolerance. The patient understands and agrees to use these medications only as prescribed. Potential side effects of the medications include, but are not limited to, constipation, drowsiness, addiction, impaired judgment and risk of fatal overdose if not taken as prescribed. The patient was warned against driving while taking sedation medications.  Sharing medications is a felony. At this point in time, the patient is showing no signs of addiction, abuse, diversion or suicidal ideation.  [place] Prescription Drug Monitoring Program report was reviewed and was appropriate      3.  Will avoid NSAIDs secondary to history of bariatric surgery  4.  Patient may continue methocarbamol and Tylenol  5. Discussed physical therapy however patient declines  6.  Follow-up after procedures or sooner if needed    My impressions and treatment recommendations were discussed in detail with the patient who  "verbalized understanding and had no further questions.  Discharge instructions were provided. I personally saw and examined the patient and I agree with the above discussed plan of care.    History of Present Illness     Patricia Mariscal is a 80 y.o. female with a history of fibromyalgia and bariatric surgery who presents to [facility] for interval re-evaluation in regards to neck pain that radiates into the upper extremities right greater than left and bilateral hip pain.  the patients last office visit was 03/26/2025 .  Patient has had relief with bilateral L5 TFESI, C6-7 CLAY, intra-articular shoulder injections and trochanteric bursa injections.  She states that she has been suffering from numerous falls over the last few months which has exacerbated her pain.  She has presented to the emergency room and updated imaging did not reveal any fractures.  She has side effects to numerous medications such as gabapentin and pregabalin and is unable to tolerate duloxetine.  She has allergies to morphine, hydrocodone, oxycodone, hydromorphone and codeine.  She continues on tramadol 50 mg which she takes sparingly and methocarbamol as needed.  She avoids NSAIDs secondary to history of bariatric surgery    On the numeric pain scale of 1-10, the pain typically increases to max of 6 out of 10, which is currently impacting their quality of life.    Review of Systems    Medical History Reviewed by provider this encounter:     .       Objective   Ht 4' 8\" (1.422 m)   Wt 105 kg (231 lb 7.7 oz)   BMI 51.90 kg/m²      Pain Score:   6  Physical Exam  Constitutional: normal, well developed, well nourished, alert, in no distress and non-toxic and no overt pain behavior.  Eyes: anicteric  HEENT: grossly intact  Neck: supple, symmetric, trachea midline and no masses   Pulmonary: even and unlabored  Cardiovascular: No edema or pitting edema present  Skin: Normal without rashes or lesions and well " hydrated  Psychiatric: Mood and affect appropriate  Neurologic: Cranial Nerves II-XII grossly intact  Musculoskeletal: antalgic and ambulates with a rolling walker.  Bilateral trochanteric flare is tender to palpation.  Bilateral cervical paraspinal and trapezii musculature tender to palpation.  Equivocal Spurling's  \  CT CERVICAL SPINE - WITHOUT CONTRAST     INDICATION:   FALL, HS.     COMPARISON: CT cervical spine dated 1/30/2025     TECHNIQUE:  CT examination of the cervical spine was performed without intravenous contrast.  Contiguous axial images were obtained. Multiplanar 2D reformatted images were created from the source data.     Radiation dose length product (DLP) for this visit:  342 mGy-cm. .  This examination, like all CT scans performed in the [facility], was performed utilizing techniques to minimize radiation dose exposure, including the use of iterative   reconstruction and automated exposure control.     IMAGE QUALITY:  Diagnostic.     FINDINGS:     ALIGNMENT: There is stable grade 1 anterolisthesis of C4 on C5. No subluxation.     VERTEBRAE:  No fracture.     DEGENERATIVE CHANGES:  Disc and facet osteophytosis, most prominent from C4-5 to C6-7 resulting in mild to moderate central canal stenosis and neural foraminal narrowing.     PREVERTEBRAL AND PARASPINAL SOFT TISSUES: Unremarkable     THORACIC INLET:  Normal.     IMPRESSION:     No cervical spine fracture or traumatic malalignment.                  CT LUMBAR SPINE WITHOUT CONTRAST     INDICATION:   Back pain and trauma.     COMPARISON: None.     TECHNIQUE:  Contiguous axial images through the lumbar spine were obtained. Sagittal and coronal reconstructions were performed.     IV Contrast:     Radiation dose length product (DLP) for this visit:  1360 mGy-cm .  This examination, like all CT scans performed in the [facility], was performed utilizing techniques to minimize radiation dose exposure, including the  use of iterative   reconstruction and automated exposure control.     IMAGE QUALITY:  Diagnostic.     FINDINGS:     ALIGNMENT: Lumbar levoscoliosis centered at L2 and compensatory dextroscoliosis at L4-5. L2 hemangioma.     VERTEBRAE:  No evidence of acute lumbar spine fracture.     DEGENERATIVE CHANGES:     Disc and facet osteophytosis throughout the lower thoracic and lumbar spine resulting in mild to moderate central canal stenosis and moderate to severe neural foraminal narrowing at L4-5 and L5-S1     PARASPINAL SOFT TISSUES: No hematoma.     OTHER: None.     IMPRESSION:     No evidence of acute lumbar spine fracture.

## 2025-06-18 ENCOUNTER — OFFICE VISIT (OUTPATIENT)
Dept: PAIN MEDICINE | Facility: CLINIC | Age: 80
End: 2025-06-18
Payer: COMMERCIAL

## 2025-06-18 VITALS — WEIGHT: 231.48 LBS | BODY MASS INDEX: 52.07 KG/M2 | HEIGHT: 56 IN

## 2025-06-18 DIAGNOSIS — M70.61 TROCHANTERIC BURSITIS OF BOTH HIPS: ICD-10-CM

## 2025-06-18 DIAGNOSIS — M54.12 CERVICAL RADICULOPATHY: Primary | ICD-10-CM

## 2025-06-18 DIAGNOSIS — M70.62 TROCHANTERIC BURSITIS OF BOTH HIPS: ICD-10-CM

## 2025-06-18 DIAGNOSIS — G89.4 CHRONIC PAIN SYNDROME: ICD-10-CM

## 2025-06-18 PROCEDURE — 99214 OFFICE O/P EST MOD 30 MIN: CPT | Performed by: NURSE PRACTITIONER

## 2025-06-18 PROCEDURE — G2211 COMPLEX E/M VISIT ADD ON: HCPCS | Performed by: NURSE PRACTITIONER

## 2025-06-18 RX ORDER — TRAMADOL HYDROCHLORIDE 50 MG/1
50 TABLET ORAL DAILY PRN
Qty: 30 TABLET | Refills: 2 | Status: SHIPPED | OUTPATIENT
Start: 2025-06-18

## 2025-06-19 ENCOUNTER — SOCIAL WORK (OUTPATIENT)
Dept: BEHAVIORAL/MENTAL HEALTH CLINIC | Facility: CLINIC | Age: 80
End: 2025-06-19
Payer: COMMERCIAL

## 2025-06-19 DIAGNOSIS — F32.1 CURRENT MODERATE EPISODE OF MAJOR DEPRESSIVE DISORDER WITHOUT PRIOR EPISODE (HCC): Primary | ICD-10-CM

## 2025-06-19 PROCEDURE — 90837 PSYTX W PT 60 MINUTES: CPT | Performed by: COUNSELOR

## 2025-06-19 NOTE — PSYCH
"Behavioral Health Psychotherapy Progress Note    Psychotherapy Provided: Individual Psychotherapy     1. Current moderate episode of major depressive disorder without prior episode (HCC)            Goals addressed in session: Goal 1     DATA: Clinician and client discussed client's reoccurring dream, client provided clinician with a journal entry about it. Discussed physiological response to the dream as well as client's revelation of the area in which she is running is the area where her second  and father of her son, used to work. Clinician inquired to the wedding band on her hand, she explained it was from her most recent marriage which ended with her partner passing away. Clinician explained that he was curious if it belonged to her second , she explained that his ring was accidentally thrown away. Client explained that she struggles with her family due to their frustration with her son as they feel he \"mistreats\" his mother (client). Clinician inquired to if client feels safe at home and if her son ever harmed her, she denied all. Continued to discuss communication, client explained that she feels she has no one to listen to her, as she does not wish to share certain things with friends and family and her son is often busy. Discussed counseling and it's use and sometimes the need for supportive counseling, client acknowledged. Discussed scheduling and concluded.  During this session, this clinician used the following therapeutic modalities: Client-centered Therapy, Cognitive Behavioral Therapy, and Supportive Psychotherapy    Substance Abuse was not addressed during this session. If the client is diagnosed with a co-occurring substance use disorder, please indicate any changes in the frequency or amount of use: N/A. Stage of change for addressing substance use diagnoses: No substance use/Not applicable    ASSESSMENT:  Patricia Mariscal presents with a Euthymic/ normal mood.     her affect is Normal " "range and intensity, which is congruent, with her mood and the content of the session. The client has made progress on their goals.    Client struggles with social interactions, she often does not like to be in crowds, also she struggles with mobility, as she uses a walker which requires her to take her time and can lead to fatigue. Patricia Mariscal presents with a none risk of suicide, none risk of self-harm, and none risk of harm to others.    For any risk assessment that surpasses a \"low\" rating, a safety plan must be developed.    A safety plan was indicated: no  If yes, describe in detail N/A    PLAN: Between sessions, Patricia Mariscal will continue to journal. At the next session, the therapist will use Client-centered Therapy, Cognitive Behavioral Therapy, and Supportive Psychotherapy to address need for social supports.    Behavioral Health Treatment Plan and Discharge Planning: Patricia Mariscal is aware of and agrees to continue to work on their treatment plan. They have identified and are working toward their discharge goals. yes    Depression Follow-up Plan Completed: Not applicable    Visit start and stop times:    06/19/25  Start Time: 1301  Stop Time: 1355  Total Visit Time: 54 minutes  "

## 2025-06-30 DIAGNOSIS — T85.79XD INFECTED PROSTHETIC MESH OF ABDOMINAL WALL, SUBSEQUENT ENCOUNTER: ICD-10-CM

## 2025-06-30 DIAGNOSIS — A49.02 MRSA INFECTION: ICD-10-CM

## 2025-06-30 RX ORDER — SULFAMETHOXAZOLE AND TRIMETHOPRIM 800; 160 MG/1; MG/1
1 TABLET ORAL 2 TIMES DAILY
Qty: 60 TABLET | Refills: 0 | Status: SHIPPED | OUTPATIENT
Start: 2025-06-30 | End: 2025-07-30

## 2025-06-30 NOTE — TELEPHONE ENCOUNTER
Reached out to patient to schedule patient for follow up with us. Patient provided several options, however has multiple other appointment conflicting. Patient scheduled 7/31 at 11am with Sarah Larsen. Patient confirms she used her last pill today.

## 2025-07-08 ENCOUNTER — TELEPHONE (OUTPATIENT)
Age: 80
End: 2025-07-08

## 2025-07-08 NOTE — TELEPHONE ENCOUNTER
Caller: Patient     Doctor: Dr. Mehta    Reason for call: Patient is calling to cancel procedure for today.     She would like to reschedule.     She apologizes for cancelling procedure again , she has a lot of other things going on.     Please assist     Call back#: [phone]

## 2025-07-09 ENCOUNTER — SOCIAL WORK (OUTPATIENT)
Dept: BEHAVIORAL/MENTAL HEALTH CLINIC | Facility: CLINIC | Age: 80
End: 2025-07-09
Payer: COMMERCIAL

## 2025-07-09 DIAGNOSIS — F32.1 CURRENT MODERATE EPISODE OF MAJOR DEPRESSIVE DISORDER WITHOUT PRIOR EPISODE (HCC): Primary | ICD-10-CM

## 2025-07-09 PROCEDURE — 90837 PSYTX W PT 60 MINUTES: CPT | Performed by: COUNSELOR

## 2025-07-09 NOTE — PSYCH
"Behavioral Health Psychotherapy Progress Note    Psychotherapy Provided: Individual Psychotherapy     1. Current moderate episode of major depressive disorder without prior episode (HCC)            Goals addressed in session: Goal 1     DATA: Clinician and client discussed her late husbands, mourning and maintaining a relationship with her son. Client discussed how she attempts to keep her relatives that passed on as part of her family, teaching her granddaughter about their familial history. Explored why this is important to her and how she is managing this. Discussed sleeping and anxiety, reviewed what anxiety looks like for client physically. Clinician reviewed TIPP and Dive Method with her and she will utilize this between sessions.   During this session, this clinician used the following therapeutic modalities: Cognitive Behavioral Therapy    Substance Abuse was not addressed during this session. If the client is diagnosed with a co-occurring substance use disorder, please indicate any changes in the frequency or amount of use: N/A. Stage of change for addressing substance use diagnoses: No substance use/Not applicable    ASSESSMENT:  Patricia Mariscal presents with a Euthymic/ normal mood.     her affect is Normal range and intensity, which is congruent, with her mood and the content of the session. The client has made progress on their goals.    Client enjoys counseling as it appears she does not have many people to speak to. She does speak rapidly and verbose.  Patricia Mariscal presents with a none risk of suicide, none risk of self-harm, and none risk of harm to others.    For any risk assessment that surpasses a \"low\" rating, a safety plan must be developed.    A safety plan was indicated: no  If yes, describe in detail N/A    PLAN: Between sessions, Patricia Mariscal will utilize learned skills and report back. At the next session, the therapist will use Cognitive Behavioral Therapy to address " homework.    Behavioral Health Treatment Plan and Discharge Planning: Patricia Mariscal is aware of and agrees to continue to work on their treatment plan. They have identified and are working toward their discharge goals. yes    Depression Follow-up Plan Completed: Not applicable    Visit start and stop times:    07/09/25  Start Time: 1601  Stop Time: 1657  Total Visit Time: 56 minutes

## 2025-07-21 ENCOUNTER — SOCIAL WORK (OUTPATIENT)
Dept: BEHAVIORAL/MENTAL HEALTH CLINIC | Facility: CLINIC | Age: 80
End: 2025-07-21
Payer: COMMERCIAL

## 2025-07-21 ENCOUNTER — TELEPHONE (OUTPATIENT)
Dept: INFECTIOUS DISEASES | Facility: CLINIC | Age: 80
End: 2025-07-21

## 2025-07-21 DIAGNOSIS — A49.02 MRSA INFECTION: Primary | ICD-10-CM

## 2025-07-21 DIAGNOSIS — Z79.2 CHRONIC ANTIBIOTIC SUPPRESSION: ICD-10-CM

## 2025-07-21 DIAGNOSIS — F32.1 CURRENT MODERATE EPISODE OF MAJOR DEPRESSIVE DISORDER WITHOUT PRIOR EPISODE (HCC): Primary | ICD-10-CM

## 2025-07-21 DIAGNOSIS — T85.79XD INFECTED PROSTHETIC MESH OF ABDOMINAL WALL, SUBSEQUENT ENCOUNTER: ICD-10-CM

## 2025-07-21 PROCEDURE — 90837 PSYTX W PT 60 MINUTES: CPT | Performed by: COUNSELOR

## 2025-07-21 NOTE — PSYCH
"Behavioral Health Psychotherapy Progress Note    Psychotherapy Provided: Individual Psychotherapy     1. Current moderate episode of major depressive disorder without prior episode (HCC)            Goals addressed in session: Goal 1     DATA: Clinician and client explored a new dream of client, discussed what client took away from it, their concerns and safety. Client also explained that a shelf and glass bowls had fallen in her home and she went to pick them up but her son walked in and stopped her. Client explained that her son spoke to her and explained that he was \"upset\" with her for trying to pick them up knowing she has poor balance and there were large glass pieces and client may have fallen into them. Clinician and client explored client's acceptance of her new reality, ie: being older and having some limitations. Discussed mourning her past self while also getting to know her new self and celebrating them. Client explained it is difficult for her, clinician sympathized and helped client speak of positive things she has going on in life now, reviewing duality. Discussed scheduling and concluded.   During this session, this clinician used the following therapeutic modalities: Cognitive Behavioral Therapy    Substance Abuse was not addressed during this session. If the client is diagnosed with a co-occurring substance use disorder, please indicate any changes in the frequency or amount of use: N/A. Stage of change for addressing substance use diagnoses: No substance use/Not applicable    ASSESSMENT:  Patricia Mariscal presents with a Euthymic/ normal mood.     her affect is Normal range and intensity, which is congruent, with her mood and the content of the session. The client has made progress on their goals.    Client enjoys counseling as it appears she does not have many people to speak to. She does speak rapidly and at long-length, implying she is often listened to. Patricia Mariscal presents with a none risk " "of suicide, none risk of self-harm, and none risk of harm to others.    For any risk assessment that surpasses a \"low\" rating, a safety plan must be developed.    A safety plan was indicated: no  If yes, describe in detail N/A    PLAN: Between sessions, Patricia Mariscal will utilize learned skills. At the next session, the therapist will use Cognitive Behavioral Therapy to address acceptance of self.    Behavioral Health Treatment Plan and Discharge Planning: Patricia Mariscal is aware of and agrees to continue to work on their treatment plan. They have identified and are working toward their discharge goals. yes    Depression Follow-up Plan Completed: Not applicable    Visit start and stop times:    07/21/25  Start Time: 1300  Stop Time: 1355  Total Visit Time: 55 minutes  "

## 2025-07-21 NOTE — TELEPHONE ENCOUNTER
Reached out to patient to remind her to please get blood work done, as she is past due while being on antibiotic. No voicemail is set up. Unable to leave message.

## 2025-07-29 DIAGNOSIS — T85.79XD INFECTED PROSTHETIC MESH OF ABDOMINAL WALL, SUBSEQUENT ENCOUNTER: ICD-10-CM

## 2025-07-29 DIAGNOSIS — A49.02 MRSA INFECTION: ICD-10-CM

## 2025-07-30 RX ORDER — SULFAMETHOXAZOLE AND TRIMETHOPRIM 800; 160 MG/1; MG/1
1 TABLET ORAL 2 TIMES DAILY
Qty: 60 TABLET | Refills: 0 | OUTPATIENT
Start: 2025-07-30 | End: 2025-08-29

## 2025-07-31 DIAGNOSIS — T85.79XD INFECTED PROSTHETIC MESH OF ABDOMINAL WALL, SUBSEQUENT ENCOUNTER: ICD-10-CM

## 2025-07-31 DIAGNOSIS — A49.02 MRSA INFECTION: ICD-10-CM

## 2025-08-01 RX ORDER — SULFAMETHOXAZOLE AND TRIMETHOPRIM 800; 160 MG/1; MG/1
1 TABLET ORAL 2 TIMES DAILY
Qty: 60 TABLET | Refills: 0 | Status: SHIPPED | OUTPATIENT
Start: 2025-08-01 | End: 2025-08-31

## 2025-08-05 ENCOUNTER — HOSPITAL ENCOUNTER (OUTPATIENT)
Dept: RADIOLOGY | Facility: CLINIC | Age: 80
Discharge: HOME | End: 2025-08-05
Attending: NURSE PRACTITIONER
Payer: COMMERCIAL

## 2025-08-05 VITALS
DIASTOLIC BLOOD PRESSURE: 70 MMHG | OXYGEN SATURATION: 100 % | SYSTOLIC BLOOD PRESSURE: 143 MMHG | TEMPERATURE: 98 F | HEART RATE: 60 BPM | RESPIRATION RATE: 16 BRPM

## 2025-08-05 DIAGNOSIS — M54.12 CERVICAL RADICULOPATHY: ICD-10-CM

## 2025-08-05 PROCEDURE — 62321 NJX INTERLAMINAR CRV/THRC: CPT | Performed by: ANESTHESIOLOGY

## 2025-08-05 RX ORDER — PAPAVERINE HCL 150 MG
10 CAPSULE, EXTENDED RELEASE ORAL ONCE
Status: COMPLETED | OUTPATIENT
Start: 2025-08-05 | End: 2025-08-05

## 2025-08-05 RX ADMIN — DEXAMETHASONE SODIUM PHOSPHATE 10 MG: 10 INJECTION, SOLUTION INTRAMUSCULAR; INTRAVENOUS at 14:09

## 2025-08-06 ENCOUNTER — SOCIAL WORK (OUTPATIENT)
Dept: BEHAVIORAL/MENTAL HEALTH CLINIC | Facility: CLINIC | Age: 80
End: 2025-08-06
Payer: COMMERCIAL

## 2025-08-06 DIAGNOSIS — F32.1 CURRENT MODERATE EPISODE OF MAJOR DEPRESSIVE DISORDER WITHOUT PRIOR EPISODE (HCC): Primary | ICD-10-CM

## 2025-08-06 DIAGNOSIS — F32.A DEPRESSION, UNSPECIFIED DEPRESSION TYPE: ICD-10-CM

## 2025-08-06 PROCEDURE — 90834 PSYTX W PT 45 MINUTES: CPT | Performed by: COUNSELOR

## 2025-08-07 RX ORDER — LORAZEPAM 1 MG/1
1 TABLET ORAL 3 TIMES DAILY PRN
Qty: 90 TABLET | Refills: 0 | Status: SHIPPED | OUTPATIENT
Start: 2025-08-07

## 2025-08-12 ENCOUNTER — TELEPHONE (OUTPATIENT)
Age: 80
End: 2025-08-12

## 2025-08-15 ENCOUNTER — TELEPHONE (OUTPATIENT)
Dept: INFECTIOUS DISEASES | Facility: CLINIC | Age: 80
End: 2025-08-15

## 2025-08-19 ENCOUNTER — TELEPHONE (OUTPATIENT)
Dept: PAIN MEDICINE | Facility: CLINIC | Age: 80
End: 2025-08-19

## 2025-08-19 ENCOUNTER — TELEPHONE (OUTPATIENT)
Age: 80
End: 2025-08-19

## 2025-08-20 ENCOUNTER — TELEMEDICINE (OUTPATIENT)
Dept: INFECTIOUS DISEASES | Facility: CLINIC | Age: 80
End: 2025-08-20
Payer: COMMERCIAL

## 2025-08-20 DIAGNOSIS — T85.79XD INFECTED PROSTHETIC MESH OF ABDOMINAL WALL, SUBSEQUENT ENCOUNTER: ICD-10-CM

## 2025-08-20 DIAGNOSIS — T85.79XA INFECTED PROSTHETIC MESH OF ABDOMINAL WALL (HCC): ICD-10-CM

## 2025-08-20 DIAGNOSIS — A49.02 MRSA INFECTION: Primary | ICD-10-CM

## 2025-08-20 PROCEDURE — 99214 OFFICE O/P EST MOD 30 MIN: CPT | Performed by: PHYSICIAN ASSISTANT

## 2025-08-20 RX ORDER — SULFAMETHOXAZOLE AND TRIMETHOPRIM 800; 160 MG/1; MG/1
1 TABLET ORAL 2 TIMES DAILY
Qty: 180 TABLET | Refills: 0 | Status: SHIPPED | OUTPATIENT
Start: 2025-08-20 | End: 2025-11-18

## 2025-08-22 ENCOUNTER — TELEPHONE (OUTPATIENT)
Dept: INFECTIOUS DISEASES | Facility: CLINIC | Age: 80
End: 2025-08-22

## (undated) DEVICE — 2000CC GUARDIAN II: Brand: GUARDIAN

## (undated) DEVICE — STERILE MANDIBLE PACK: Brand: CARDINAL HEALTH